# Patient Record
Sex: MALE | Race: BLACK OR AFRICAN AMERICAN | NOT HISPANIC OR LATINO | Employment: OTHER | URBAN - NONMETROPOLITAN AREA
[De-identification: names, ages, dates, MRNs, and addresses within clinical notes are randomized per-mention and may not be internally consistent; named-entity substitution may affect disease eponyms.]

---

## 2020-06-24 ENCOUNTER — HISTORICAL (OUTPATIENT)
Dept: ADMINISTRATIVE | Facility: HOSPITAL | Age: 75
End: 2020-06-24

## 2020-06-24 LAB
APTT PPP: 23.3 SEC (ref 23–30.4)
INR PPP: 1 (ref 0.9–1.2)
PROTHROMBIN TIME: 10.5 SEC (ref 9.8–12.4)

## 2020-06-30 ENCOUNTER — HISTORICAL (OUTPATIENT)
Dept: RADIOLOGY | Facility: HOSPITAL | Age: 75
End: 2020-06-30

## 2022-01-11 ENCOUNTER — HISTORICAL (OUTPATIENT)
Dept: ADMINISTRATIVE | Facility: HOSPITAL | Age: 77
End: 2022-01-11

## 2022-01-11 LAB
ABS NEUT (OLG): 3.7 X10(3)/MCL (ref 2.1–9.2)
ALBUMIN SERPL-MCNC: 3.3 GM/DL (ref 3.4–4.8)
ALBUMIN/GLOB SERPL: 0.9 RATIO (ref 1.1–2)
ALP SERPL-CCNC: 81 UNIT/L (ref 40–150)
ALT SERPL-CCNC: 32 UNIT/L (ref 0–55)
AST SERPL-CCNC: 26 UNIT/L (ref 5–34)
BASOPHILS # BLD AUTO: 0.02 X10(3)/MCL (ref 0–0.2)
BASOPHILS NFR BLD AUTO: 0.4 % (ref 0–0.9)
BILIRUB SERPL-MCNC: 0.2 MG/DL (ref 0.2–1.2)
BILIRUBIN DIRECT+TOT PNL SERPL-MCNC: 0.1 MG/DL (ref 0–0.5)
BILIRUBIN DIRECT+TOT PNL SERPL-MCNC: 0.1 MG/DL (ref 0–0.8)
BUN SERPL-MCNC: 25.4 MG/DL (ref 8.4–25.7)
CALCIUM SERPL-MCNC: 8.9 MG/DL (ref 8.8–10)
CHLORIDE SERPL-SCNC: 100 MMOL/L (ref 98–107)
CHOLEST SERPL-MCNC: 124 MG/DL
CHOLEST/HDLC SERPL: 3 {RATIO} (ref 0–5)
CO2 SERPL-SCNC: 30 MMOL/L (ref 23–31)
CREAT SERPL-MCNC: 4.62 MG/DL (ref 0.72–1.25)
EOSINOPHIL # BLD AUTO: 0.11 X10(3)/MCL (ref 0–0.9)
EOSINOPHIL NFR BLD AUTO: 2 % (ref 0–6.5)
ERYTHROCYTE [DISTWIDTH] IN BLOOD BY AUTOMATED COUNT: 14.6 % (ref 11.5–17)
GLOBULIN SER-MCNC: 3.7 GM/DL (ref 2.4–3.5)
GLUCOSE SERPL-MCNC: 104 MG/DL (ref 82–115)
HCT VFR BLD AUTO: 27.2 % (ref 42–52)
HDLC SERPL-MCNC: 43 MG/DL (ref 40–60)
HGB BLD-MCNC: 8.8 GM/DL (ref 14–18)
IMM GRANULOCYTES # BLD AUTO: 0.01 10*3/UL (ref 0–0.02)
IMM GRANULOCYTES NFR BLD AUTO: 0.2 % (ref 0–0.43)
LDLC SERPL CALC-MCNC: 65 MG/DL (ref 50–140)
LYMPHOCYTES # BLD AUTO: 1.19 X10(3)/MCL (ref 0.6–4.6)
LYMPHOCYTES NFR BLD AUTO: 21.3 % (ref 16.2–38.3)
MCH RBC QN AUTO: 32.8 PG (ref 27–31)
MCHC RBC AUTO-ENTMCNC: 32.4 GM/DL (ref 33–36)
MCV RBC AUTO: 101.5 FL (ref 80–94)
MONOCYTES # BLD AUTO: 0.56 X10(3)/MCL (ref 0.1–1.3)
MONOCYTES NFR BLD AUTO: 10 % (ref 4.7–11.3)
NEUTROPHILS # BLD AUTO: 3.7 X10(3)/MCL (ref 2.1–9.2)
NEUTROPHILS NFR BLD AUTO: 66.1 % (ref 49.1–73.4)
NRBC BLD AUTO-RTO: 0.5 % (ref 0–0.2)
PLATELET # BLD AUTO: 242 X10(3)/MCL (ref 130–400)
PMV BLD AUTO: 9.6 FL (ref 7.4–10.4)
POTASSIUM SERPL-SCNC: 3.6 MMOL/L (ref 3.5–5.1)
PROT SERPL-MCNC: 7 GM/DL (ref 5.8–7.6)
RBC # BLD AUTO: 2.68 X10(6)/MCL (ref 4.7–6.1)
SODIUM SERPL-SCNC: 141 MMOL/L (ref 136–145)
TRIGL SERPL-MCNC: 80 MG/DL (ref 0–150)
VLDLC SERPL CALC-MCNC: 16 MG/DL
WBC # SPEC AUTO: 5.6 X10(3)/MCL (ref 4.5–11.5)

## 2022-03-07 ENCOUNTER — HISTORICAL (OUTPATIENT)
Dept: ADMINISTRATIVE | Facility: HOSPITAL | Age: 77
End: 2022-03-07

## 2022-03-07 LAB — PSA SERPL-MCNC: 0.34 NG/ML

## 2022-03-11 ENCOUNTER — HISTORICAL (OUTPATIENT)
Dept: ADMINISTRATIVE | Facility: HOSPITAL | Age: 77
End: 2022-03-11

## 2022-03-11 LAB
ABS NEUT (OLG): 3.31 (ref 2.1–9.2)
ALBUMIN SERPL-MCNC: 3.1 G/DL (ref 3.4–4.8)
ALBUMIN/GLOB SERPL: 0.9 {RATIO} (ref 1.1–2)
ALP SERPL-CCNC: 83 U/L (ref 40–150)
ALT SERPL-CCNC: 21 U/L (ref 0–55)
AST SERPL-CCNC: 18 U/L (ref 5–34)
BASOPHILS # BLD AUTO: 0.02 10*3/UL (ref 0–0.2)
BASOPHILS NFR BLD AUTO: 0.4 % (ref 0–0.9)
BILIRUB SERPL-MCNC: 0.2 MG/DL (ref 0.2–1.2)
BILIRUBIN DIRECT+TOT PNL SERPL-MCNC: <0.1 (ref 0–0.5)
BILIRUBIN DIRECT+TOT PNL SERPL-MCNC: >0.1 (ref 0–0.8)
BUN SERPL-MCNC: 35.9 MG/DL (ref 8.4–25.7)
CALCIUM SERPL-MCNC: 8.4 MG/DL (ref 8.8–10)
CHLORIDE SERPL-SCNC: 100 MMOL/L (ref 98–107)
CO2 SERPL-SCNC: 30 MMOL/L (ref 23–31)
CREAT SERPL-MCNC: 6.33 MG/DL (ref 0.72–1.25)
EOSINOPHIL # BLD AUTO: 0.13 10*3/UL (ref 0–0.9)
EOSINOPHIL NFR BLD AUTO: 2.4 % (ref 0–6.5)
ERYTHROCYTE [DISTWIDTH] IN BLOOD BY AUTOMATED COUNT: 14.6 % (ref 11.5–17)
GLOBULIN SER-MCNC: 3.3 G/DL (ref 2.4–3.5)
GLUCOSE SERPL-MCNC: 101 MG/DL (ref 82–115)
HCT VFR BLD AUTO: 22.3 % (ref 42–52)
HEMOLYSIS INTERF INDEX SERPL-ACNC: 3
HGB BLD-MCNC: 7.3 G/DL (ref 14–18)
ICTERIC INTERF INDEX SERPL-ACNC: 0
IMM GRANULOCYTES # BLD AUTO: 0.05 10*3/UL (ref 0–0.02)
IMM GRANULOCYTES NFR BLD AUTO: 0.9 % (ref 0–0.43)
LIPEMIC INTERF INDEX SERPL-ACNC: 4
LYMPHOCYTES # BLD AUTO: 1.31 10*3/UL (ref 0.6–4.6)
LYMPHOCYTES NFR BLD AUTO: 23.9 % (ref 16.2–38.3)
MANUAL DIFF? (OHS): NO
MCH RBC QN AUTO: 34.1 PG (ref 27–31)
MCHC RBC AUTO-ENTMCNC: 32.7 G/DL (ref 33–36)
MCV RBC AUTO: 104.2 FL (ref 80–94)
MONOCYTES # BLD AUTO: 0.65 10*3/UL (ref 0.1–1.3)
MONOCYTES NFR BLD AUTO: 11.9 % (ref 4.7–11.3)
NEUTROPHILS # BLD AUTO: 3.31 10*3/UL (ref 2.1–9.2)
NEUTROPHILS NFR BLD AUTO: 60.5 % (ref 49.1–73.4)
NRBC BLD AUTO-RTO: 0 % (ref 0–0.2)
PLATELET # BLD AUTO: 220 10*3/UL (ref 130–400)
PMV BLD AUTO: 9.1 FL (ref 7.4–10.4)
POTASSIUM SERPL-SCNC: 4.1 MMOL/L (ref 3.5–5.1)
PROT SERPL-MCNC: 6.4 G/DL (ref 5.8–7.6)
PSA SERPL-MCNC: 0.37 NG/ML
RBC # BLD AUTO: 2.14 10*6/UL (ref 4.7–6.1)
SODIUM SERPL-SCNC: 139 MMOL/L (ref 136–145)
WBC # SPEC AUTO: 5.5 10*3/UL (ref 4.5–11.5)

## 2022-08-18 ENCOUNTER — LAB REQUISITION (OUTPATIENT)
Dept: LAB | Facility: HOSPITAL | Age: 77
End: 2022-08-18
Payer: MEDICARE

## 2022-08-18 DIAGNOSIS — G40.89 OTHER SEIZURES: ICD-10-CM

## 2022-08-18 PROCEDURE — 80177 DRUG SCRN QUAN LEVETIRACETAM: CPT

## 2022-08-19 LAB — LEVETIRACETAM SERPL-MCNC: 28.3 MCG/ML (ref 10–40)

## 2022-09-01 ENCOUNTER — LAB REQUISITION (OUTPATIENT)
Dept: LAB | Facility: HOSPITAL | Age: 77
End: 2022-09-01
Payer: MEDICARE

## 2022-09-01 DIAGNOSIS — G40.89 OTHER SEIZURES: ICD-10-CM

## 2022-09-01 DIAGNOSIS — I10 ESSENTIAL (PRIMARY) HYPERTENSION: ICD-10-CM

## 2022-09-01 DIAGNOSIS — N40.1 BENIGN PROSTATIC HYPERPLASIA WITH LOWER URINARY TRACT SYMPTOMS: ICD-10-CM

## 2022-09-01 LAB
ACANTHOCYTES (OLG): SLIGHT
ALBUMIN SERPL-MCNC: 3.4 GM/DL (ref 3.4–4.8)
ALBUMIN/GLOB SERPL: 0.9 RATIO (ref 1.1–2)
ALP SERPL-CCNC: 90 UNIT/L (ref 40–150)
ALT SERPL-CCNC: 20 UNIT/L (ref 0–55)
ANISOCYTOSIS BLD QL SMEAR: ABNORMAL
AST SERPL-CCNC: 16 UNIT/L (ref 5–34)
BASOPHILS # BLD AUTO: 0.02 X10(3)/MCL (ref 0–0.2)
BASOPHILS NFR BLD AUTO: 0.5 %
BILIRUBIN DIRECT+TOT PNL SERPL-MCNC: 0.5 MG/DL
BUN SERPL-MCNC: 16.4 MG/DL (ref 8.4–25.7)
CALCIUM SERPL-MCNC: 8.7 MG/DL (ref 8.8–10)
CHLORIDE SERPL-SCNC: 97 MMOL/L (ref 98–107)
CHOLEST SERPL-MCNC: 137 MG/DL
CHOLEST/HDLC SERPL: 3 {RATIO} (ref 0–5)
CO2 SERPL-SCNC: 31 MMOL/L (ref 23–31)
CREAT SERPL-MCNC: 3.98 MG/DL (ref 0.73–1.18)
EOSINOPHIL # BLD AUTO: 0.22 X10(3)/MCL (ref 0–0.9)
EOSINOPHIL NFR BLD AUTO: 5.3 %
ERYTHROCYTE [DISTWIDTH] IN BLOOD BY AUTOMATED COUNT: 17.2 % (ref 11.5–17)
GFR SERPLBLD CREATININE-BSD FMLA CKD-EPI: 15 MLS/MIN/1.73/M2
GLOBULIN SER-MCNC: 3.6 GM/DL (ref 2.4–3.5)
GLUCOSE SERPL-MCNC: 68 MG/DL (ref 82–115)
HCT VFR BLD AUTO: 35.8 % (ref 42–52)
HDLC SERPL-MCNC: 49 MG/DL (ref 35–60)
HGB BLD-MCNC: 11.7 GM/DL (ref 14–18)
HYPOCHROMIA BLD QL SMEAR: ABNORMAL
IMM GRANULOCYTES # BLD AUTO: 0.01 X10(3)/MCL (ref 0–0.04)
IMM GRANULOCYTES NFR BLD AUTO: 0.2 %
LDLC SERPL CALC-MCNC: 73 MG/DL (ref 50–140)
LYMPHOCYTES # BLD AUTO: 1.01 X10(3)/MCL (ref 0.6–4.6)
LYMPHOCYTES NFR BLD AUTO: 24.2 %
MACROCYTES BLD QL SMEAR: ABNORMAL
MCH RBC QN AUTO: 34.7 PG (ref 27–31)
MCHC RBC AUTO-ENTMCNC: 32.7 MG/DL (ref 33–36)
MCV RBC AUTO: 106.2 FL (ref 80–94)
MONOCYTES # BLD AUTO: 0.46 X10(3)/MCL (ref 0.1–1.3)
MONOCYTES NFR BLD AUTO: 11 %
NEUTROPHILS # BLD AUTO: 2.5 X10(3)/MCL (ref 2.1–9.2)
NEUTROPHILS NFR BLD AUTO: 58.8 %
NRBC BLD AUTO-RTO: 0 %
PLATELET # BLD AUTO: 182 X10(3)/MCL (ref 130–400)
PLATELET # BLD EST: ADEQUATE 10*3/UL
PMV BLD AUTO: 9.3 FL (ref 7.4–10.4)
POIKILOCYTOSIS BLD QL SMEAR: SLIGHT
POTASSIUM SERPL-SCNC: 3.8 MMOL/L (ref 3.5–5.1)
PROT SERPL-MCNC: 7 GM/DL (ref 5.8–7.6)
PSA SERPL-MCNC: 0.37 NG/ML
RBC # BLD AUTO: 3.37 X10(6)/MCL (ref 4.7–6.1)
RBC MORPH BLD: ABNORMAL
SODIUM SERPL-SCNC: 144 MMOL/L (ref 136–145)
TRIGL SERPL-MCNC: 74 MG/DL (ref 34–140)
VLDLC SERPL CALC-MCNC: 15 MG/DL
WBC # SPEC AUTO: 4.2 X10(3)/MCL (ref 4.5–11.5)

## 2022-09-01 PROCEDURE — 85025 COMPLETE CBC W/AUTO DIFF WBC: CPT | Performed by: INTERNAL MEDICINE

## 2022-09-01 PROCEDURE — 80053 COMPREHEN METABOLIC PANEL: CPT | Performed by: INTERNAL MEDICINE

## 2022-09-01 PROCEDURE — 84153 ASSAY OF PSA TOTAL: CPT | Performed by: INTERNAL MEDICINE

## 2022-09-01 PROCEDURE — 80061 LIPID PANEL: CPT | Performed by: INTERNAL MEDICINE

## 2022-09-01 PROCEDURE — 80177 DRUG SCRN QUAN LEVETIRACETAM: CPT | Performed by: INTERNAL MEDICINE

## 2022-09-03 LAB — LEVETIRACETAM SERPL-MCNC: 21.5 MCG/ML (ref 10–40)

## 2023-01-06 ENCOUNTER — LAB REQUISITION (OUTPATIENT)
Dept: LAB | Facility: HOSPITAL | Age: 78
End: 2023-01-06
Payer: MEDICARE

## 2023-01-06 DIAGNOSIS — R53.1 WEAKNESS: ICD-10-CM

## 2023-01-06 LAB
ALBUMIN SERPL-MCNC: 3.5 G/DL (ref 3.4–4.8)
ALBUMIN/GLOB SERPL: 0.8 RATIO (ref 1.1–2)
ALP SERPL-CCNC: 80 UNIT/L (ref 40–150)
ALT SERPL-CCNC: 16 UNIT/L (ref 0–55)
ANISOCYTOSIS BLD QL SMEAR: ABNORMAL
AST SERPL-CCNC: 15 UNIT/L (ref 5–34)
BASOPHILS # BLD AUTO: 0.02 X10(3)/MCL (ref 0–0.2)
BASOPHILS NFR BLD AUTO: 0.4 %
BILIRUBIN DIRECT+TOT PNL SERPL-MCNC: 0.6 MG/DL
BUN SERPL-MCNC: 12.4 MG/DL (ref 8.4–25.7)
CALCIUM SERPL-MCNC: 9.2 MG/DL (ref 8.8–10)
CHLORIDE SERPL-SCNC: 96 MMOL/L (ref 98–107)
CO2 SERPL-SCNC: 36 MMOL/L (ref 23–31)
CREAT SERPL-MCNC: 2.74 MG/DL (ref 0.73–1.18)
DEPRECATED CALCIDIOL+CALCIFEROL SERPL-MC: 29.6 NG/ML (ref 30–80)
EOSINOPHIL # BLD AUTO: 0.16 X10(3)/MCL (ref 0–0.9)
EOSINOPHIL NFR BLD AUTO: 3.3 %
ERYTHROCYTE [DISTWIDTH] IN BLOOD BY AUTOMATED COUNT: 15.7 % (ref 11.6–14.4)
GFR SERPLBLD CREATININE-BSD FMLA CKD-EPI: 23 MLS/MIN/1.73/M2
GLOBULIN SER-MCNC: 4.3 GM/DL (ref 2.4–3.5)
GLUCOSE SERPL-MCNC: 108 MG/DL (ref 82–115)
HCT VFR BLD AUTO: 31.1 % (ref 42–52)
HGB BLD-MCNC: 10.3 GM/DL (ref 14–18)
IMM GRANULOCYTES # BLD AUTO: 0.01 X10(3)/MCL (ref 0–0.04)
IMM GRANULOCYTES NFR BLD AUTO: 0.2 %
LYMPHOCYTES # BLD AUTO: 1.02 X10(3)/MCL (ref 0.6–4.6)
LYMPHOCYTES NFR BLD AUTO: 20.8 %
MACROCYTES BLD QL SMEAR: ABNORMAL
MCH RBC QN AUTO: 35 PG
MCHC RBC AUTO-ENTMCNC: 33.1 MG/DL (ref 33–36)
MCV RBC AUTO: 105.8 FL (ref 80–94)
MONOCYTES # BLD AUTO: 0.45 X10(3)/MCL (ref 0.1–1.3)
MONOCYTES NFR BLD AUTO: 9.2 %
NEUTROPHILS # BLD AUTO: 3.25 X10(3)/MCL (ref 2.1–9.2)
NEUTROPHILS NFR BLD AUTO: 66.1 %
NRBC BLD AUTO-RTO: 0 % (ref 0–1)
PLATELET # BLD AUTO: 209 X10(3)/MCL (ref 140–371)
PLATELET # BLD EST: ADEQUATE 10*3/UL
PMV BLD AUTO: 8.9 FL (ref 9.4–12.4)
POTASSIUM SERPL-SCNC: 3.2 MMOL/L (ref 3.5–5.1)
PROT SERPL-MCNC: 7.8 GM/DL (ref 5.8–7.6)
RBC # BLD AUTO: 2.94 X10(6)/MCL (ref 4.7–6.1)
RBC MORPH BLD: ABNORMAL
SODIUM SERPL-SCNC: 140 MMOL/L (ref 136–145)
WBC # SPEC AUTO: 4.9 X10(3)/MCL (ref 4.5–11.5)

## 2023-01-06 PROCEDURE — 80053 COMPREHEN METABOLIC PANEL: CPT | Performed by: NURSE PRACTITIONER

## 2023-01-06 PROCEDURE — 82306 VITAMIN D 25 HYDROXY: CPT | Performed by: NURSE PRACTITIONER

## 2023-01-06 PROCEDURE — 85025 COMPLETE CBC W/AUTO DIFF WBC: CPT | Performed by: NURSE PRACTITIONER

## 2023-02-06 ENCOUNTER — LAB REQUISITION (OUTPATIENT)
Dept: LAB | Facility: HOSPITAL | Age: 78
End: 2023-02-06
Payer: MEDICARE

## 2023-02-06 DIAGNOSIS — G40.509 EPILEPTIC SEIZURES RELATED TO EXTERNAL CAUSES, NOT INTRACTABLE, WITHOUT STATUS EPILEPTICUS: ICD-10-CM

## 2023-02-06 PROCEDURE — 80177 DRUG SCRN QUAN LEVETIRACETAM: CPT

## 2023-02-08 LAB — LEVETIRACETAM SERPL-MCNC: 11.6 MCG/ML (ref 10–40)

## 2023-02-28 ENCOUNTER — LAB REQUISITION (OUTPATIENT)
Dept: LAB | Facility: HOSPITAL | Age: 78
End: 2023-02-28
Payer: MEDICARE

## 2023-02-28 DIAGNOSIS — I10 ESSENTIAL (PRIMARY) HYPERTENSION: ICD-10-CM

## 2023-02-28 DIAGNOSIS — E87.6 HYPOKALEMIA: ICD-10-CM

## 2023-02-28 DIAGNOSIS — N40.1 BENIGN PROSTATIC HYPERPLASIA WITH LOWER URINARY TRACT SYMPTOMS: ICD-10-CM

## 2023-02-28 LAB
ALBUMIN SERPL-MCNC: 3.5 G/DL (ref 3.4–4.8)
ALBUMIN/GLOB SERPL: 0.9 RATIO (ref 1.1–2)
ALP SERPL-CCNC: 88 UNIT/L (ref 40–150)
ALT SERPL-CCNC: 15 UNIT/L (ref 0–55)
ANISOCYTOSIS BLD QL SMEAR: ABNORMAL
AST SERPL-CCNC: 12 UNIT/L (ref 5–34)
BASOPHILS # BLD AUTO: 0.02 X10(3)/MCL (ref 0–0.2)
BASOPHILS NFR BLD AUTO: 0.3 %
BILIRUBIN DIRECT+TOT PNL SERPL-MCNC: 0.2 MG/DL
BUN SERPL-MCNC: 22.9 MG/DL (ref 8.4–25.7)
CALCIUM SERPL-MCNC: 9.2 MG/DL (ref 8.8–10)
CHLORIDE SERPL-SCNC: 98 MMOL/L (ref 98–107)
CHOLEST SERPL-MCNC: 128 MG/DL
CHOLEST/HDLC SERPL: 3 {RATIO} (ref 0–5)
CO2 SERPL-SCNC: 30 MMOL/L (ref 23–31)
CREAT SERPL-MCNC: 5.45 MG/DL (ref 0.73–1.18)
EOSINOPHIL # BLD AUTO: 0.25 X10(3)/MCL (ref 0–0.9)
EOSINOPHIL NFR BLD AUTO: 3.7 %
ERYTHROCYTE [DISTWIDTH] IN BLOOD BY AUTOMATED COUNT: 15.5 % (ref 11.5–17)
GFR SERPLBLD CREATININE-BSD FMLA CKD-EPI: 10 MLS/MIN/1.73/M2
GLOBULIN SER-MCNC: 3.8 GM/DL (ref 2.4–3.5)
GLUCOSE SERPL-MCNC: 99 MG/DL (ref 82–115)
HCT VFR BLD AUTO: 34.6 % (ref 42–52)
HDLC SERPL-MCNC: 45 MG/DL (ref 35–60)
HGB BLD-MCNC: 11.3 G/DL (ref 14–18)
IMM GRANULOCYTES # BLD AUTO: 0.02 X10(3)/MCL (ref 0–0.04)
IMM GRANULOCYTES NFR BLD AUTO: 0.3 %
LDLC SERPL CALC-MCNC: 65 MG/DL (ref 50–140)
LYMPHOCYTES # BLD AUTO: 1.36 X10(3)/MCL (ref 0.6–4.6)
LYMPHOCYTES NFR BLD AUTO: 20.1 %
MACROCYTES BLD QL SMEAR: ABNORMAL
MCH RBC QN AUTO: 35.2 PG
MCHC RBC AUTO-ENTMCNC: 32.7 G/DL (ref 33–36)
MCV RBC AUTO: 107.8 FL (ref 80–94)
MONOCYTES # BLD AUTO: 0.52 X10(3)/MCL (ref 0.1–1.3)
MONOCYTES NFR BLD AUTO: 7.7 %
NEUTROPHILS # BLD AUTO: 4.61 X10(3)/MCL (ref 2.1–9.2)
NEUTROPHILS NFR BLD AUTO: 67.9 %
NRBC BLD AUTO-RTO: 0.3 %
PLATELET # BLD AUTO: 189 X10(3)/MCL (ref 130–400)
PLATELET # BLD EST: ADEQUATE 10*3/UL
PMV BLD AUTO: 9.1 FL (ref 7.4–10.4)
POTASSIUM SERPL-SCNC: 3.7 MMOL/L (ref 3.5–5.1)
PROT SERPL-MCNC: 7.3 GM/DL (ref 5.8–7.6)
PSA SERPL-MCNC: 0.43 NG/ML
RBC # BLD AUTO: 3.21 X10(6)/MCL (ref 4.7–6.1)
RBC MORPH BLD: ABNORMAL
SODIUM SERPL-SCNC: 140 MMOL/L (ref 136–145)
TRIGL SERPL-MCNC: 91 MG/DL (ref 34–140)
VLDLC SERPL CALC-MCNC: 18 MG/DL
WBC # SPEC AUTO: 6.8 X10(3)/MCL (ref 4.5–11.5)

## 2023-02-28 PROCEDURE — 84153 ASSAY OF PSA TOTAL: CPT | Performed by: INTERNAL MEDICINE

## 2023-02-28 PROCEDURE — 80061 LIPID PANEL: CPT | Performed by: INTERNAL MEDICINE

## 2023-02-28 PROCEDURE — 80177 DRUG SCRN QUAN LEVETIRACETAM: CPT | Performed by: INTERNAL MEDICINE

## 2023-02-28 PROCEDURE — 80053 COMPREHEN METABOLIC PANEL: CPT | Performed by: INTERNAL MEDICINE

## 2023-02-28 PROCEDURE — 85025 COMPLETE CBC W/AUTO DIFF WBC: CPT | Performed by: INTERNAL MEDICINE

## 2023-03-01 LAB — LEVETIRACETAM SERPL-MCNC: 22.6 MCG/ML (ref 10–40)

## 2023-03-10 ENCOUNTER — LAB REQUISITION (OUTPATIENT)
Dept: LAB | Facility: HOSPITAL | Age: 78
End: 2023-03-10
Payer: MEDICARE

## 2023-03-10 DIAGNOSIS — N40.1 BENIGN PROSTATIC HYPERPLASIA WITH LOWER URINARY TRACT SYMPTOMS: ICD-10-CM

## 2023-03-10 LAB — PSA SERPL-MCNC: 0.42 NG/ML

## 2023-03-10 PROCEDURE — 84153 ASSAY OF PSA TOTAL: CPT | Performed by: UROLOGY

## 2023-04-13 ENCOUNTER — LAB REQUISITION (OUTPATIENT)
Dept: LAB | Facility: HOSPITAL | Age: 78
End: 2023-04-13
Payer: MEDICARE
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
## 2023-04-13 LAB
ANION GAP SERPL CALC-SCNC: 11 MEQ/L
BASOPHILS # BLD AUTO: 0.01 X10(3)/MCL (ref 0–0.2)
BASOPHILS NFR BLD AUTO: 0.2 %
BUN SERPL-MCNC: 20.2 MG/DL (ref 8.4–25.7)
CALCIUM SERPL-MCNC: 9.7 MG/DL (ref 8.8–10)
CHLORIDE SERPL-SCNC: 98 MMOL/L (ref 98–107)
CO2 SERPL-SCNC: 30 MMOL/L (ref 23–31)
CREAT SERPL-MCNC: 5.18 MG/DL (ref 0.73–1.18)
CREAT/UREA NIT SERPL: 4
EOSINOPHIL # BLD AUTO: 0.21 X10(3)/MCL (ref 0–0.9)
EOSINOPHIL NFR BLD AUTO: 4.2 %
ERYTHROCYTE [DISTWIDTH] IN BLOOD BY AUTOMATED COUNT: 14.2 % (ref 11.5–17)
GFR SERPLBLD CREATININE-BSD FMLA CKD-EPI: 11 MLS/MIN/1.73/M2
GLUCOSE SERPL-MCNC: 168 MG/DL (ref 82–115)
HCT VFR BLD AUTO: 33.9 % (ref 42–52)
HGB BLD-MCNC: 10.9 G/DL (ref 14–18)
HYPOCHROMIA BLD QL SMEAR: ABNORMAL
IMM GRANULOCYTES # BLD AUTO: 0.01 X10(3)/MCL (ref 0–0.04)
IMM GRANULOCYTES NFR BLD AUTO: 0.2 %
LYMPHOCYTES # BLD AUTO: 1.45 X10(3)/MCL (ref 0.6–4.6)
LYMPHOCYTES NFR BLD AUTO: 28.8 %
MACROCYTES BLD QL SMEAR: ABNORMAL
MCH RBC QN AUTO: 34.3 PG (ref 27–31)
MCHC RBC AUTO-ENTMCNC: 32.2 G/DL (ref 33–36)
MCV RBC AUTO: 106.6 FL (ref 80–94)
MONOCYTES # BLD AUTO: 0.27 X10(3)/MCL (ref 0.1–1.3)
MONOCYTES NFR BLD AUTO: 5.4 %
NEUTROPHILS # BLD AUTO: 3.08 X10(3)/MCL (ref 2.1–9.2)
NEUTROPHILS NFR BLD AUTO: 61.2 %
NRBC BLD AUTO-RTO: 0 %
PLATELET # BLD AUTO: 200 X10(3)/MCL (ref 130–400)
PLATELET # BLD EST: ADEQUATE 10*3/UL
PMV BLD AUTO: 9.2 FL (ref 7.4–10.4)
POTASSIUM SERPL-SCNC: 3.6 MMOL/L (ref 3.5–5.1)
RBC # BLD AUTO: 3.18 X10(6)/MCL (ref 4.7–6.1)
RBC MORPH BLD: ABNORMAL
SODIUM SERPL-SCNC: 139 MMOL/L (ref 136–145)
WBC # SPEC AUTO: 5 X10(3)/MCL (ref 4.5–11.5)

## 2023-04-13 PROCEDURE — 85025 COMPLETE CBC W/AUTO DIFF WBC: CPT | Performed by: SPECIALIST

## 2023-04-13 PROCEDURE — 80048 BASIC METABOLIC PNL TOTAL CA: CPT | Performed by: SPECIALIST

## 2023-04-20 ENCOUNTER — HOSPITAL ENCOUNTER (OUTPATIENT)
Facility: HOSPITAL | Age: 78
Discharge: HOME | End: 2023-04-20
Attending: STUDENT IN AN ORGANIZED HEALTH CARE EDUCATION/TRAINING PROGRAM | Admitting: STUDENT IN AN ORGANIZED HEALTH CARE EDUCATION/TRAINING PROGRAM
Payer: MEDICARE

## 2023-04-20 VITALS
HEART RATE: 86 BPM | SYSTOLIC BLOOD PRESSURE: 128 MMHG | OXYGEN SATURATION: 93 % | DIASTOLIC BLOOD PRESSURE: 81 MMHG | TEMPERATURE: 98 F

## 2023-04-20 DIAGNOSIS — T82.590A MECHANICAL COMPLICATION OF ARTERIOVENOUS FISTULA SURGICALLY CREATED, INITIAL ENCOUNTER: Primary | ICD-10-CM

## 2023-04-20 DIAGNOSIS — T82.590A MECHANICAL COMPLICATION OF ARTERIOVENOUS FISTULA SURGICALLY CREATED: ICD-10-CM

## 2023-04-20 PROCEDURE — 25500020 PHARM REV CODE 255: Performed by: STUDENT IN AN ORGANIZED HEALTH CARE EDUCATION/TRAINING PROGRAM

## 2023-04-20 PROCEDURE — 99152 MOD SED SAME PHYS/QHP 5/>YRS: CPT | Mod: ,,, | Performed by: STUDENT IN AN ORGANIZED HEALTH CARE EDUCATION/TRAINING PROGRAM

## 2023-04-20 PROCEDURE — 27201423 OPTIME MED/SURG SUP & DEVICES STERILE SUPPLY: Performed by: STUDENT IN AN ORGANIZED HEALTH CARE EDUCATION/TRAINING PROGRAM

## 2023-04-20 PROCEDURE — 63600175 PHARM REV CODE 636 W HCPCS: Performed by: STUDENT IN AN ORGANIZED HEALTH CARE EDUCATION/TRAINING PROGRAM

## 2023-04-20 PROCEDURE — 99152 PR MOD CONSCIOUS SEDATION, SAME PHYS, 5+ YRS, FIRST 15 MIN: ICD-10-PCS | Mod: ,,, | Performed by: STUDENT IN AN ORGANIZED HEALTH CARE EDUCATION/TRAINING PROGRAM

## 2023-04-20 PROCEDURE — 99213 PR OFFICE/OUTPT VISIT, EST, LEVL III, 20-29 MIN: ICD-10-PCS | Mod: 25,,, | Performed by: STUDENT IN AN ORGANIZED HEALTH CARE EDUCATION/TRAINING PROGRAM

## 2023-04-20 PROCEDURE — 36903 INTRO CATH DIALYSIS CIRCUIT: CPT | Mod: LT,,, | Performed by: STUDENT IN AN ORGANIZED HEALTH CARE EDUCATION/TRAINING PROGRAM

## 2023-04-20 PROCEDURE — C1874 STENT, COATED/COV W/DEL SYS: HCPCS | Performed by: STUDENT IN AN ORGANIZED HEALTH CARE EDUCATION/TRAINING PROGRAM

## 2023-04-20 PROCEDURE — 36903 PR INTRO CATH, DIALYSIS CIRCUIT W/TRANSCATH PLCMNT, STENT: ICD-10-PCS | Mod: LT,,, | Performed by: STUDENT IN AN ORGANIZED HEALTH CARE EDUCATION/TRAINING PROGRAM

## 2023-04-20 PROCEDURE — C1769 GUIDE WIRE: HCPCS | Performed by: STUDENT IN AN ORGANIZED HEALTH CARE EDUCATION/TRAINING PROGRAM

## 2023-04-20 PROCEDURE — 99213 OFFICE O/P EST LOW 20 MIN: CPT | Mod: 25,,, | Performed by: STUDENT IN AN ORGANIZED HEALTH CARE EDUCATION/TRAINING PROGRAM

## 2023-04-20 PROCEDURE — 36903 INTRO CATH DIALYSIS CIRCUIT: CPT | Mod: LT | Performed by: STUDENT IN AN ORGANIZED HEALTH CARE EDUCATION/TRAINING PROGRAM

## 2023-04-20 PROCEDURE — 25000003 PHARM REV CODE 250: Performed by: STUDENT IN AN ORGANIZED HEALTH CARE EDUCATION/TRAINING PROGRAM

## 2023-04-20 PROCEDURE — C1894 INTRO/SHEATH, NON-LASER: HCPCS | Performed by: STUDENT IN AN ORGANIZED HEALTH CARE EDUCATION/TRAINING PROGRAM

## 2023-04-20 PROCEDURE — 99152 MOD SED SAME PHYS/QHP 5/>YRS: CPT | Performed by: STUDENT IN AN ORGANIZED HEALTH CARE EDUCATION/TRAINING PROGRAM

## 2023-04-20 PROCEDURE — 99153 MOD SED SAME PHYS/QHP EA: CPT | Performed by: STUDENT IN AN ORGANIZED HEALTH CARE EDUCATION/TRAINING PROGRAM

## 2023-04-20 PROCEDURE — C1725 CATH, TRANSLUMIN NON-LASER: HCPCS | Performed by: STUDENT IN AN ORGANIZED HEALTH CARE EDUCATION/TRAINING PROGRAM

## 2023-04-20 DEVICE — GORE VIABAHN ENDOPROSTHESIS 10MMX5CM 8FR 120CMCATH
Type: IMPLANTABLE DEVICE | Site: ARM | Status: FUNCTIONAL
Brand: GORE VIABAHN ENDOPROSTHESIS

## 2023-04-20 RX ORDER — LIDOCAINE HYDROCHLORIDE 10 MG/ML
INJECTION INFILTRATION; PERINEURAL
Status: DISCONTINUED | OUTPATIENT
Start: 2023-04-20 | End: 2023-04-20 | Stop reason: HOSPADM

## 2023-04-20 RX ORDER — SODIUM CHLORIDE 0.9 % (FLUSH) 0.9 %
10 SYRINGE (ML) INJECTION
Status: DISCONTINUED | OUTPATIENT
Start: 2023-04-20 | End: 2023-04-20 | Stop reason: HOSPADM

## 2023-04-20 RX ORDER — FENTANYL CITRATE 50 UG/ML
INJECTION, SOLUTION INTRAMUSCULAR; INTRAVENOUS
Status: DISCONTINUED | OUTPATIENT
Start: 2023-04-20 | End: 2023-04-20 | Stop reason: HOSPADM

## 2023-04-20 RX ORDER — MIDAZOLAM HYDROCHLORIDE 1 MG/ML
INJECTION INTRAMUSCULAR; INTRAVENOUS
Status: DISCONTINUED | OUTPATIENT
Start: 2023-04-20 | End: 2023-04-20 | Stop reason: HOSPADM

## 2023-04-20 NOTE — NURSING
Report given to Sandie FINN from [facility], copy of post fistulagram care instruction and the stent card is read out to her and it will be with AVS folder.

## 2023-04-20 NOTE — DISCHARGE SUMMARY
INTERVENTIONAL NEPHROLOGY DISCHARGE SUMMARY         Patient Name: Angel Pablo Sr. TEIXEIRAB. 1945    Procedure Date: 2023      In brief, Mr. Pablo underwent fistulogram for enlarging aneurysms and access pulsatility. Angiogram revealed the following:  A stenosis that remained persistent despite angioplasty in the cephalic arch. This was ultimately treated with a Viabahn 10 mm x 5 cm stent-graft. There was good resolution of stenosis.  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 Manipulating a wire through these areas was somewhat challenging, but was quickly achieved.     I am pleased with the results of today's treatment. In my opinion, I believe the access can be salvaged, though particular care will need to be had near the areas of of tortuosity listed above. I do not expect there to be a significant outflow stenosis that would result in a bad outcome from revision now that a stent-graft has been placed in the cephalic arch.    Pre-Op Diagnosis: T82.590A Mechanical complication of surgically created arteriovenous shunt, initial encounter, N18.6 End Stage Renal Disease (ESRD)  Post-Op Diagnosis: Same.  Findings:   -     Discharge Instructions/Recommendations:  - Continue use of fistula.   - Pt will likely be scheduled for revision of his AVF.  - Pt may be discharged after successful monitoring in post-op area.  - Pt may resume home medications.    Thank you for allowing me the opportunity in taking care of this patient. Please reach me with any questions.    Akil Escobar DO  Interventional Nephrology  Cell: [phone]

## 2023-04-20 NOTE — Clinical Note
The balloon was inflated with indeflator in the  . The balloon max pressure was 16 kevin for 20 seconds

## 2023-04-20 NOTE — Clinical Note
The balloon was inflated with indeflator in the  . The balloon max pressure was 16 kevin for 40 seconds

## 2023-04-20 NOTE — H&P
INTERVENTIONAL NEPHROLOGY HISTORY & PHYSICAL UPDATE         Patient Name: Angel Pablo Sr. TEIXEIRAB. 1945    Date: 2023      Scanned History & Physical reviewed. Updates are as follows:  - Pt presents with heavily aneurysmal access that will potentially undergo revision. He has been referred for angiogram to determine suitability for revision, with consideration of treating any significant stenotic lesions that may be present.  - Consents obtained and placed in chart.  - Will proceed in cath lab setting today.    Please reach me with any questions.    Akil Escobar DO  Interventional Nephrology  Cell: [phone]    ADDENDUM: H&P was not scanned into the EMR. Will instead scribe a replacement below.          INTERVENTIONAL NEPHROLOGY HISTORY & PHYSICAL       Patient Name: Angel Pablo SrSheng ALBERTO 1945    Date: 2023  Time: 10:12 AM         HPI: 77 y.o. male with ESRD on HD via left brachiocephalic arteriovenous fistula who presents with significantly aneurysmal fistula that may undergo revision in the near future. Pt states that his access has been growing in size. He was referred to our service by Dr. Bates for evaluation, and was seen in clinic with Dr. Delatorre two weeks ago. At that time it was determined the pt may need to undergo revision of the fistula, but a fistulogram would be necessary for surgical planning. Pt is being prepared for fistulogram with possible intervention today.    Pt seen and examined at bedside in CVSS this AM. Family is not present. Risks and benefits of fistulogram with possible intervention and intravenous conscious sedation was reviewed with the patient. The patient agrees to proceed with the intended procedure. Consents for both intravenous conscious sedation and procedure were signed and placed within the chart.       Review of Systems:  General:  No fatigue  Skin: No rashes  HEENT: No vision changes  CVS: No CP  RS: No SOB  GIT: No abdominal  pain  Extremities: No swelling  Neurological:  No focal weakness  Psych: No depression    History reviewed. No pertinent past medical history.   History reviewed. No pertinent surgical history.   Review of patient's allergies indicates:  No Known Allergies   Social History     Tobacco Use    Smoking status: Never    Smokeless tobacco: Never   Substance Use Topics    Alcohol use: Not Currently    Drug use: Never      History reviewed. No pertinent family history.    No current facility-administered medications for this encounter.  No current outpatient medications on file.    Vital Signs:  Pulse:  [86] 86  SpO2:  [93 %] 93 %  BP: (121-128)/(78-81) 128/81     Physical Exam:  General: NAD  HEENT: NC/AT, EOMI  CVS: RRR.  RS: breathing easily.  Abdominal: Soft, NT/ND.  Extremities: No edema b/l LE  Skin: No rash, no lesions.  Neurological: No focal deficits.  Psych: Normal affect  Dialysis Access: left brachiocephalic arteriovenous fistula with moderate-severe pulsatility. Severely aneurysmal throughout.     Results:    Lab Results   Component Value Date     09/01/2022    K 3.8 09/01/2022    CO2 31 09/01/2022    BUN 16.4 09/01/2022    CREATININE 3.98 (H) 09/01/2022     Lab Results   Component Value Date    WBC 4.2 (L) 09/01/2022    HGB 11.7 (L) 09/01/2022     09/01/2022    .2 (H) 09/01/2022       Assessment and Plan:      ESRD on HD via left brachiocephalic arteriovenous fistula.  Mechanical complication of AVF.  Pt with ESRD on HD via left brachiocephalic arteriovenous fistula who presents today with aneurysmal fistula. The pt is being prepared for fistulogram with possible intervention today.  - Consents obtained and placed within chart.  - Will proceed in cath lab setting today.    Please feel free to reach me with any questions.    Akil Escobar,   Interventional Nephrology  Cell: [phone]

## 2023-04-20 NOTE — Clinical Note
The balloon was inflated with indeflator in the  . The balloon max pressure was 6 kevin for 10 seconds

## 2023-04-20 NOTE — PROCEDURES
INTERVENTIONAL NEPHROLOGY PROCEDURE NOTE: FISTULOGRAM/GRAFTOGRAM         Patient Name: Angel Pablo Sr. MONTANO 1945    Procedure Date:    2023    Performing Physician:   Dr. Escobar    Access History: Pt is with ESRD on HD typically via left brachiocephalic arteriovenous fistula who presents today with aneurysm and pulsatility.    Pre-Op Diagnosis: T82.590A Mechanical complication of surgically created arteriovenous shunt, initial encounter, N18.6 End Stage Renal Disease (ESRD)  Post-Op Diagnosis: Same    Procedure: Fistulogram with possible angioplasty and stent placement.    Indication: Nonfunctional/Dysfunctional/Malfunctioning HD access.    Informed Consent:  The patient was evaluated in the pre-operative area with assessment including the American Society of Anesthesia risk classification. The procedure is discussed in detail including risks, benefits alternatives and options and the patient agrees to proceed. Informed consent was obtained from the patient.     Maximum sterile barrier technique: The patient was prepped and draped using chlorhexidine prep and maximum sterile barrier technique.    Sedation Note:  Risks and benefits of sedation were reviewed with the patient or surrogate, including bleeding, infection, nausea, vomiting, dizziness, instability, damage to a nerve, damage to a blood vessel, cellulitis, reaction to medications, amnesia, loss of consciousness, respiratory arrest, cardiac arrest.     The patient received the following medications: Versed 1 mg IV and Fentanyl 25 mcg IV; patient did remain alert, responsive, and conversational throughout the procedure. I was personally responsible for supervising the administration of moderate sedation services during the procedure performed and I affirm all the guidelines and requirements described in the CPT 2023 section on moderate sedation were followed, including the use of an independent trained observer who had no other duties  during the procedure. The total face-to-face time was 45 minutes.    Procedure Steps:     The patient was prepped and draped in sterile fashion. Procedure ultrasound revealed patent vascular HD access.    Local anesthesia was administered by injecting 1% lidocaine at the intended site of cannulation. With use of live ultrasonographic visualization, the vascular access was successfully cannulated with a mini-stick needle aimed towards the outflow. After blood flashback was noted, the mini-stick wire was advanced through the needle. Via Seldinger technique, the needle was exchanged for the mini-stick sheath. Angiograms were completed which revealed 1 lesion(s) (summarized below). A 150 cm glide wire was advanced through the mini-stick sheath with the tip parked in the superior vena cava. A 6 Fr sheath was exchanged via Seldinger technique for the mini-stick sheath.    The aforementioned lesion(s) are as below, followed by their respective intervention(s) :  #1: Approximately 80% stenosis in the left cephalic arch.  Angioplasty was performed at this location using a [employer]  9 mm x 30 mm balloon. Approximately 75% effacement was obtained at 15-20 GEORGE and was held for 1-5 seconds. Post-angioplasty angiogram revealed 50% residual stenosis. We then upgraded our balloon to a BD Conquest 9 mm x 4 cm and placed it at the same stenosis, inflating to 16 GEORGE and achieving 100% effacement. Post-angioplasty angiogram revealed a 30% residual stenosis. There was concern this area would recur stenosis in the short-term, and therefore stent-graft was pursued. The sheath was upgraded to an 8 Fr size. A [place] Viabahn 10 mm x 50 mm stent-graft was placed at the left cephalic arch. The stent-graft was posted with a BD [employer] 10 mm x 40 mm balloon. Post-angioplasty angiogram revealed appropriate deployment of stent-graft and complete resolution of stenosis.  * Retrograde angiogram showed patent inflow segment without  notable lesions.    Lesions were treated in the following order: #1.    Wire and sheath were removed and hemostasis was achieved using a purse-string stitch and light digital pressure at the site of cannulation.    ASSESSMENT/PLAN:  - Successful angioplasty and stent-graft placement in the left cephalic arch.    EBL: 5 ml    Contrast: 40 ml    Complications: None    Post-op Instructions: The patient was given both verbal and written post-op instructions. If excessive bleeding at the site, they have been instructed to call their physician or proceed to a local emergency room.    Orders to the dialysis unit: OK to use access for dialysis needs.    Thank you for allowing me the opportunity in taking care of this patient. Please reach me with any questions.    Akil Escobar DO  Interventional Nephrology  Cell: [phone]

## 2023-04-20 NOTE — Clinical Note
The left radial and left brachial was prepped. The site was prepped with ChloraPrep. The site was clipped. The patient was draped. The patient was positioned supine. Left Arm AV Fistula

## 2023-04-20 NOTE — Clinical Note
The balloon was inflated with indeflator in the  . The balloon max pressure was 16 kevin for 20 seconds normal...

## 2023-04-21 RX ORDER — LEVETIRACETAM 500 MG/1
500 TABLET ORAL 2 TIMES DAILY
COMMUNITY

## 2023-04-21 RX ORDER — ASPIRIN 81 MG/1
81 TABLET ORAL DAILY
COMMUNITY

## 2023-04-21 RX ORDER — TAMSULOSIN HYDROCHLORIDE 0.4 MG/1
0.8 CAPSULE ORAL NIGHTLY
COMMUNITY

## 2023-04-21 RX ORDER — ATORVASTATIN CALCIUM 40 MG/1
40 TABLET, FILM COATED ORAL NIGHTLY
COMMUNITY

## 2023-04-21 RX ORDER — MIRABEGRON 50 MG/1
1 TABLET, FILM COATED, EXTENDED RELEASE ORAL DAILY
COMMUNITY

## 2023-04-21 RX ORDER — MEMANTINE HYDROCHLORIDE 10 MG/1
10 TABLET ORAL 2 TIMES DAILY
COMMUNITY

## 2023-04-21 RX ORDER — ONDANSETRON 4 MG/1
4 TABLET, ORALLY DISINTEGRATING ORAL EVERY 6 HOURS PRN
COMMUNITY

## 2023-04-21 RX ORDER — DEXTROMETHORPHAN HYDROBROMIDE, GUAIFENESIN 5; 100 MG/5ML; MG/5ML
650 LIQUID ORAL EVERY 4 HOURS PRN
COMMUNITY

## 2023-04-21 RX ORDER — POTASSIUM CHLORIDE 750 MG/1
10 TABLET, EXTENDED RELEASE ORAL ONCE
COMMUNITY

## 2023-04-21 RX ORDER — IBUPROFEN 100 MG/5ML
1000 SUSPENSION, ORAL (FINAL DOSE FORM) ORAL DAILY
COMMUNITY

## 2023-04-21 RX ORDER — ACETAMINOPHEN 500 MG
10 TABLET ORAL NIGHTLY
COMMUNITY

## 2023-05-02 ENCOUNTER — ANESTHESIA EVENT (OUTPATIENT)
Dept: SURGERY | Facility: HOSPITAL | Age: 78
End: 2023-05-02
Payer: MEDICARE

## 2023-05-03 ENCOUNTER — ANESTHESIA (OUTPATIENT)
Dept: SURGERY | Facility: HOSPITAL | Age: 78
End: 2023-05-03
Payer: MEDICARE

## 2023-05-03 ENCOUNTER — HOSPITAL ENCOUNTER (OUTPATIENT)
Facility: HOSPITAL | Age: 78
Discharge: HOME | End: 2023-05-03
Attending: SPECIALIST | Admitting: SPECIALIST
Payer: MEDICARE

## 2023-05-03 VITALS
DIASTOLIC BLOOD PRESSURE: 74 MMHG | OXYGEN SATURATION: 95 % | WEIGHT: 174.63 LBS | HEIGHT: 73 IN | BODY MASS INDEX: 23.14 KG/M2 | SYSTOLIC BLOOD PRESSURE: 126 MMHG | TEMPERATURE: 98 F | HEART RATE: 75 BPM | RESPIRATION RATE: 18 BRPM

## 2023-05-03 DIAGNOSIS — N18.6 END STAGE KIDNEY DISEASE: ICD-10-CM

## 2023-05-03 DIAGNOSIS — T82.510A: Primary | ICD-10-CM

## 2023-05-03 DIAGNOSIS — T82.510D: ICD-10-CM

## 2023-05-03 DIAGNOSIS — I25.10 CORONARY ARTERY DISEASE: ICD-10-CM

## 2023-05-03 LAB
ANION GAP SERPL CALC-SCNC: 9 MEQ/L
BUN SERPL-MCNC: 20.3 MG/DL (ref 8.4–25.7)
CALCIUM SERPL-MCNC: 9.4 MG/DL (ref 8.8–10)
CHLORIDE SERPL-SCNC: 97 MMOL/L (ref 98–107)
CO2 SERPL-SCNC: 33 MMOL/L (ref 23–31)
CREAT SERPL-MCNC: 4.77 MG/DL (ref 0.73–1.18)
CREAT/UREA NIT SERPL: 4
GFR SERPLBLD CREATININE-BSD FMLA CKD-EPI: 12 MLS/MIN/1.73/M2
GLUCOSE SERPL-MCNC: 96 MG/DL (ref 82–115)
POTASSIUM SERPL-SCNC: 3.9 MMOL/L (ref 3.5–5.1)
SODIUM SERPL-SCNC: 139 MMOL/L (ref 136–145)

## 2023-05-03 PROCEDURE — D9220A PRA ANESTHESIA: Mod: CRNA,,, | Performed by: STUDENT IN AN ORGANIZED HEALTH CARE EDUCATION/TRAINING PROGRAM

## 2023-05-03 PROCEDURE — 63600175 PHARM REV CODE 636 W HCPCS: Performed by: NURSE ANESTHETIST, CERTIFIED REGISTERED

## 2023-05-03 PROCEDURE — 25000003 PHARM REV CODE 250: Performed by: STUDENT IN AN ORGANIZED HEALTH CARE EDUCATION/TRAINING PROGRAM

## 2023-05-03 PROCEDURE — 25000003 PHARM REV CODE 250: Performed by: SPECIALIST

## 2023-05-03 PROCEDURE — 37000009 HC ANESTHESIA EA ADD 15 MINS: Performed by: SPECIALIST

## 2023-05-03 PROCEDURE — 63600175 PHARM REV CODE 636 W HCPCS: Performed by: STUDENT IN AN ORGANIZED HEALTH CARE EDUCATION/TRAINING PROGRAM

## 2023-05-03 PROCEDURE — 64415 NJX AA&/STRD BRCH PLXS IMG: CPT | Performed by: ANESTHESIOLOGY

## 2023-05-03 PROCEDURE — C1750 CATH, HEMODIALYSIS,LONG-TERM: HCPCS | Performed by: SPECIALIST

## 2023-05-03 PROCEDURE — 63600175 PHARM REV CODE 636 W HCPCS: Performed by: ANESTHESIOLOGY

## 2023-05-03 PROCEDURE — 36832 AV FISTULA REVISION OPEN: CPT | Mod: 78,LT,, | Performed by: SPECIALIST

## 2023-05-03 PROCEDURE — D9220A PRA ANESTHESIA: ICD-10-PCS | Mod: ANES,,, | Performed by: ANESTHESIOLOGY

## 2023-05-03 PROCEDURE — 63600175 PHARM REV CODE 636 W HCPCS: Performed by: SPECIALIST

## 2023-05-03 PROCEDURE — 80048 BASIC METABOLIC PNL TOTAL CA: CPT | Performed by: SPECIALIST

## 2023-05-03 PROCEDURE — 88304 TISSUE EXAM BY PATHOLOGIST: CPT | Performed by: SPECIALIST

## 2023-05-03 PROCEDURE — D9220A PRA ANESTHESIA: Mod: ANES,,, | Performed by: ANESTHESIOLOGY

## 2023-05-03 PROCEDURE — 71000015 HC POSTOP RECOV 1ST HR: Performed by: SPECIALIST

## 2023-05-03 PROCEDURE — 37000008 HC ANESTHESIA 1ST 15 MINUTES: Performed by: SPECIALIST

## 2023-05-03 PROCEDURE — 36558 INSERT TUNNELED CV CATH: CPT | Mod: 79,51,RT, | Performed by: SPECIALIST

## 2023-05-03 PROCEDURE — D9220A PRA ANESTHESIA: ICD-10-PCS | Mod: CRNA,,, | Performed by: STUDENT IN AN ORGANIZED HEALTH CARE EDUCATION/TRAINING PROGRAM

## 2023-05-03 PROCEDURE — 36000706: Performed by: SPECIALIST

## 2023-05-03 PROCEDURE — 36832 PR AV FISTULA REVISION, OPEN, W/O THROMBECTOMY: ICD-10-PCS | Mod: 78,AS,,

## 2023-05-03 PROCEDURE — 71000016 HC POSTOP RECOV ADDL HR: Performed by: SPECIALIST

## 2023-05-03 PROCEDURE — 25000003 PHARM REV CODE 250: Performed by: NURSE ANESTHETIST, CERTIFIED REGISTERED

## 2023-05-03 PROCEDURE — 63600175 PHARM REV CODE 636 W HCPCS

## 2023-05-03 PROCEDURE — 36832 AV FISTULA REVISION OPEN: CPT | Mod: 78,AS,,

## 2023-05-03 PROCEDURE — 36000707: Performed by: SPECIALIST

## 2023-05-03 PROCEDURE — 77001 FLUOROGUIDE FOR VEIN DEVICE: CPT | Mod: 26,,, | Performed by: SPECIALIST

## 2023-05-03 PROCEDURE — 36832 PR AV FISTULA REVISION, OPEN, W/O THROMBECTOMY: ICD-10-PCS | Mod: 78,LT,, | Performed by: SPECIALIST

## 2023-05-03 PROCEDURE — 77001 CHG FLUOROGUIDE CNTRL VEN ACCESS,PLACE,REPLACE,REMOVE: ICD-10-PCS | Mod: 26,,, | Performed by: SPECIALIST

## 2023-05-03 PROCEDURE — 36558 PR INSERT TUNNELED CV CATH W/O PORT OR PUMP: ICD-10-PCS | Mod: 79,51,RT, | Performed by: SPECIALIST

## 2023-05-03 DEVICE — KIT CATH PALINDROME 23X40CM: Type: IMPLANTABLE DEVICE | Site: CHEST | Status: FUNCTIONAL

## 2023-05-03 RX ORDER — LIDOCAINE HYDROCHLORIDE 20 MG/ML
INJECTION, SOLUTION EPIDURAL; INFILTRATION; INTRACAUDAL; PERINEURAL
Status: DISCONTINUED | OUTPATIENT
Start: 2023-05-03 | End: 2023-05-03

## 2023-05-03 RX ORDER — HYDROCODONE BITARTRATE AND ACETAMINOPHEN 5; 325 MG/1; MG/1
1 TABLET ORAL EVERY 4 HOURS PRN
Status: DISCONTINUED | OUTPATIENT
Start: 2023-05-03 | End: 2023-05-03 | Stop reason: HOSPADM

## 2023-05-03 RX ORDER — CEFAZOLIN SODIUM 2 G/50ML
2 SOLUTION INTRAVENOUS
Status: DISCONTINUED | OUTPATIENT
Start: 2023-05-03 | End: 2023-05-03 | Stop reason: HOSPADM

## 2023-05-03 RX ORDER — ROPIVACAINE HYDROCHLORIDE 5 MG/ML
INJECTION, SOLUTION EPIDURAL; INFILTRATION; PERINEURAL
Status: COMPLETED
Start: 2023-05-03 | End: 2023-05-03

## 2023-05-03 RX ORDER — FENTANYL CITRATE 50 UG/ML
25-200 INJECTION, SOLUTION INTRAMUSCULAR; INTRAVENOUS
Status: CANCELLED | OUTPATIENT
Start: 2023-05-03

## 2023-05-03 RX ORDER — ROPIVACAINE HYDROCHLORIDE 5 MG/ML
INJECTION, SOLUTION EPIDURAL; INFILTRATION; PERINEURAL
Status: COMPLETED | OUTPATIENT
Start: 2023-05-03 | End: 2023-05-03

## 2023-05-03 RX ORDER — MIDAZOLAM HYDROCHLORIDE 1 MG/ML
INJECTION INTRAMUSCULAR; INTRAVENOUS
Status: COMPLETED
Start: 2023-05-03 | End: 2023-05-03

## 2023-05-03 RX ORDER — MIDAZOLAM HYDROCHLORIDE 1 MG/ML
.5-4 INJECTION INTRAMUSCULAR; INTRAVENOUS
Status: CANCELLED | OUTPATIENT
Start: 2023-05-03

## 2023-05-03 RX ORDER — CEFAZOLIN SODIUM 1 G/3ML
INJECTION, POWDER, FOR SOLUTION INTRAMUSCULAR; INTRAVENOUS
Status: DISCONTINUED | OUTPATIENT
Start: 2023-05-03 | End: 2023-05-03 | Stop reason: HOSPADM

## 2023-05-03 RX ORDER — PHENYLEPHRINE HYDROCHLORIDE 10 MG/ML
INJECTION INTRAVENOUS
Status: DISCONTINUED | OUTPATIENT
Start: 2023-05-03 | End: 2023-05-03

## 2023-05-03 RX ORDER — HEPARIN SODIUM 5000 [USP'U]/ML
INJECTION, SOLUTION INTRAVENOUS; SUBCUTANEOUS
Status: DISCONTINUED | OUTPATIENT
Start: 2023-05-03 | End: 2023-05-03 | Stop reason: HOSPADM

## 2023-05-03 RX ORDER — ONDANSETRON 2 MG/ML
INJECTION INTRAMUSCULAR; INTRAVENOUS
Status: DISCONTINUED | OUTPATIENT
Start: 2023-05-03 | End: 2023-05-03

## 2023-05-03 RX ORDER — HEPARIN SODIUM 1000 [USP'U]/ML
INJECTION, SOLUTION INTRAVENOUS; SUBCUTANEOUS
Status: DISCONTINUED | OUTPATIENT
Start: 2023-05-03 | End: 2023-05-03 | Stop reason: HOSPADM

## 2023-05-03 RX ORDER — PROPOFOL 10 MG/ML
VIAL (ML) INTRAVENOUS CONTINUOUS PRN
Status: DISCONTINUED | OUTPATIENT
Start: 2023-05-03 | End: 2023-05-03

## 2023-05-03 RX ORDER — LIDOCAINE HYDROCHLORIDE 10 MG/ML
INJECTION INFILTRATION; PERINEURAL
Status: DISCONTINUED | OUTPATIENT
Start: 2023-05-03 | End: 2023-05-03 | Stop reason: HOSPADM

## 2023-05-03 RX ADMIN — PHENYLEPHRINE HYDROCHLORIDE 200 MCG: 10 INJECTION INTRAVENOUS at 01:05

## 2023-05-03 RX ADMIN — CEFAZOLIN SODIUM 2 G: 2 SOLUTION INTRAVENOUS at 11:05

## 2023-05-03 RX ADMIN — PHENYLEPHRINE HYDROCHLORIDE 200 MCG: 10 INJECTION INTRAVENOUS at 12:05

## 2023-05-03 RX ADMIN — SODIUM CHLORIDE: 9 INJECTION, SOLUTION INTRAVENOUS at 11:05

## 2023-05-03 RX ADMIN — ONDANSETRON 4 MG: 2 INJECTION INTRAMUSCULAR; INTRAVENOUS at 11:05

## 2023-05-03 RX ADMIN — ROPIVACAINE HYDROCHLORIDE 30 ML: 5 INJECTION, SOLUTION EPIDURAL; INFILTRATION; PERINEURAL at 11:05

## 2023-05-03 RX ADMIN — LIDOCAINE HYDROCHLORIDE 40 MG: 20 INJECTION, SOLUTION EPIDURAL; INFILTRATION; INTRACAUDAL; PERINEURAL at 11:05

## 2023-05-03 RX ADMIN — MIDAZOLAM HYDROCHLORIDE 0.5 MG: 1 INJECTION, SOLUTION INTRAMUSCULAR; INTRAVENOUS at 10:05

## 2023-05-03 RX ADMIN — PHENYLEPHRINE HYDROCHLORIDE 20 MCG/MIN: 10 INJECTION INTRAVENOUS at 01:05

## 2023-05-03 RX ADMIN — PROPOFOL 25 MCG/KG/MIN: 10 INJECTION, EMULSION INTRAVENOUS at 11:05

## 2023-05-03 NOTE — ANESTHESIA PREPROCEDURE EVALUATION
"                                                                                                             05/03/2023  Angel Pablo Sr. is a 77 y.o., male who presents with ESRD w/ need for   Hemodialysis access(Permanent). He currently has access, AV Fistula, which needs revision.  Diagnosis:        Mechanical breakdown of surgically created arteriovenous fistula, subsequent encounter [T82.510D]       End stage kidney disease [N18.6]    The pt. comes to [facility] for the noted procedure under Regional Anesth.(Supraclavicular Blk) w/   IV sedation vs GA/LMA.  Procedures:        REVISION, AV FISTULA (Left) - LEFT UPPER ARM FISTULA REVISION WITH TUNNELED CATHETER INSERTION // VASCULAR // SUPINE // SUPRACLAVICULAR BLOCK       INSERTION, VASCULAR ACCESS CATHETER        PMHx:  Other Medical History   End stage renal disease Mechanical breakdown of surgically created arteriovenous fistula, subsequent encounter   Stroke Dialysis patient   Hypertension Benign prostatic hyperplasia with lower urinary tract symptoms   Dementia Hemiplegia and hemiparesis following cerebral infarction affecting left dominant side   Insomnia Pain in left knee   Presbyopia Repeated falls   Muscle wasting and atrophy, not elsewhere classified, multiple sites Paralytic gait   Other lack of coordination Presence of intraocular lens   Hyperlipidemia Seizures   Insomnia Hypokalemia   H/O prostate cancer      Surgical History:  KNEE SURGERY COLONOSCOPY   FISTULOGRAM            Vital signs:  Pre Vitals     Current as of 05/03/23 0904  BP: 113/73 Pulse: 75   Resp: 16 SpO2: 97   Temp: 36.8 °C (98.3 °F)   Height: 6' 1" (1.854 m) (04/21/23) Weight: 79.2 kg (174 lb 9.6 oz) (04/21/23)   BMI: 23 IBW: 79.9 kg (176 lb 1.7 oz)   Last edited 05/03/23 0819 by GAMAL          Lab Data:      EKG:    Pre-op Assessment    I have reviewed the Patient Summary Reports.     I have reviewed the Nursing Notes. I have reviewed the NPO Status.   I have reviewed the Medications. "     Review of Systems  Anesthesia Hx:  No problems with previous Anesthesia    Social:  Non-Smoker    Hematology/Oncology:  Hematology Normal   Oncology Normal     EENT/Dental:EENT/Dental Normal   Cardiovascular:   Exercise tolerance: good Hypertension  Functional Capacity good / => 4 METS    Pulmonary:  Pulmonary Normal    Renal/:   Chronic Renal Disease, ESRD, Dialysis    Hepatic/GI:  Hepatic/GI Normal    Musculoskeletal:  Musculoskeletal Normal    Neurological:   CVA Neuromuscular Disease, Seizures    Endocrine:  Endocrine Normal    Dermatological:  Skin Normal    Psych:   Psychiatric History depression          Physical Exam  General: Alert, Oriented, Well nourished and Cooperative    Airway:  Mallampati: II   Mouth Opening: Normal  TM Distance: Normal  Tongue: Normal  Neck ROM: Normal ROM    Dental:  Intact    Chest/Lungs:  Clear to auscultation, Normal Respiratory Rate    Heart:  Rate: Normal  Rhythm: Regular Rhythm        Anesthesia Plan  Type of Anesthesia, risks & benefits discussed:    Anesthesia Type: Regional, Gen Natural Airway  Intra-op Monitoring Plan: Standard ASA Monitors  Post Op Pain Control Plan: peripheral nerve block  Induction:  IV  Informed Consent: Informed consent signed with the Patient and all parties understand the risks and agree with anesthesia plan.  All questions answered.   ASA Score: 3  Day of Surgery Review of History & Physical: H&P Update referred to the surgeon/provider.    Ready For Surgery From Anesthesia Perspective.     .       17:04

## 2023-05-03 NOTE — ANESTHESIA PROCEDURE NOTES
Peripheral Block/Supraclavicular Blk    Patient location during procedure: holding area   Block not for primary anesthetic.  Reason for block: at surgeon's request and post-op pain management   Post-op Pain Location: Left Upper Arm, AV Fistula   Start time: 5/3/2023 11:00 AM  Timeout: 5/3/2023 10:55 AM   End time: 5/3/2023 11:03 AM    Staffing  Authorizing Provider: William Núñez MD  Performing Provider: William Núñez MD    Preanesthetic Checklist  Completed: patient identified, IV checked, site marked, risks and benefits discussed, surgical consent, monitors and equipment checked, pre-op evaluation and timeout performed  Peripheral Block  Patient position: supine  Prep: ChloraPrep  Patient monitoring: heart rate, cardiac monitor, continuous pulse ox, continuous capnometry and frequent blood pressure checks  Block type: supraclavicular  Laterality: left  Injection technique: single shot  Needle  Needle type: Stimuplex   Needle gauge: 22 G  Needle length: 4 in  Needle localization: anatomical landmarks, ultrasound guidance and nerve stimulator   -ultrasound image captured on disc.  Assessment  Injection assessment: negative aspiration, negative parasthesia and local visualized surrounding nerve  Paresthesia pain: none  Heart rate change: no  Slow fractionated injection: yes  Pain Tolerance: comfortable throughout block  Medications:    Medications: ropivacaine (NAROPIN) injection 0.5% - Perineural   30 mL - 5/3/2023 11:00:00 AM    Additional Notes      Anatomy of Brachial plexus and needle identified utilizing Ultrasound. With needle in close  Proximity to nerves and neg.aspiration, local anesthetic injected w/o difficulty and injectate  Observed surrounding nerves.         VSS.  [facility] RN monitoring vitals throughout procedure.  Patient tolerated procedure well.

## 2023-05-03 NOTE — TRANSFER OF CARE
"Anesthesia Transfer of Care Note    Patient: Angel Pablo Sr.    Procedure(s) Performed: Procedure(s) (LRB):  EXCISION, AV FISTULA (Left)  INSERTION, VASCULAR ACCESS CATHETER (N/A)    Patient location: OPS    Anesthesia Type: MAC and regional    Transport from OR: Transported from OR on room air with adequate spontaneous ventilation    Post pain: adequate analgesia    Post assessment: no apparent anesthetic complications    Post vital signs: stable    Level of consciousness: awake, alert and oriented    Nausea/Vomiting: no nausea/vomiting    Complications: none    Transfer of care protocol was followed      Last vitals:   Visit Vitals  /63   Pulse 73   Temp 36.8 °C (98.3 °F) (Oral)   Resp 18   Ht 6' 1" (1.854 m)   Wt 79.2 kg (174 lb 9.6 oz)   SpO2 100%   BMI 23.04 kg/m²     "

## 2023-05-03 NOTE — DISCHARGE INSTRUCTIONS
-NO driving and no alcohol consumption for 24 hours and while taking narcotic pain medications.    -ELEVATE affected extremity as needed to aid in pain and inflammation.    -Keep site clean and dry for 48 hours. OK to shower afterwards. Do not submerge incision under water. Dermabond/steri-strips will fall off on its own time. Do not peel off.    -No heavy lifting with affected extremity. Do not lift objects greater than 10lbs.    -Monitor extremity for good circulation. If you received block, it can last 8-12 hours.    -Monitor incision for signs of infection: redness, swelling, drainage/pus/foul odor, fever, chills.    -Report to your nearest ER and/or notify your provider if you experience any SUDDEN/SEVERE chest/abdominal pain, weakness, trouble breathing, uncontrolled pain or bleeding

## 2023-05-03 NOTE — OP NOTE
[facility] - Periop Services  General Surgery  Operative Note    SUMMARY     Date of Procedure: 5/3/2023     Procedure:   Right IJ Tunneled catheter insertion  Excision of left arm aneurysmal fistula    Surgeon(s) and Role:     * Natalie Delatorre MD - Primary    Assisting Surgeon: Mandy Shen NP    Pre-Operative Diagnosis: Mechanical breakdown of surgically created arteriovenous fistula, subsequent encounter [T82.510D]  End stage kidney disease [N18.6]    Post-Operative Diagnosis: Post-Op Diagnosis Codes:     * Mechanical breakdown of surgically created arteriovenous fistula, subsequent encounter [T82.510D]     * End stage kidney disease [N18.6]    Anesthesia: Choice    Operative Findings (including complications, if any): Large aneurysmal left arm fistula    Description of Technical Procedures: Mr. Pablo was taken to the operating room and placed in supine position.  The bilateral neck and chest were prepped and draped in the usual sterile fashion.  Appropriate time-out was performed.  B-mode ultrasound was utilized to identify the right internal jugular vein which was easily compressible.  1% lidocaine was injected in the subcutaneous tissues and a needle was utilized to cannulate the vein.  A wire was advanced serial dilation was performed.  A peel-away introducer was placed.  The appropriately measured the catheter length with the assistance of fluoroscopy and shows an appropriate catheter exit site on the chest wall.  Additional lidocaine was used for anesthetic to the chest wall.  Small incision was made at the intended catheter exit site antegrade tunneling of a 23 cm palindrome catheter was performed it was passed through a peel-away introducer.  Catheter tip was advanced just inside the right atrium.  Positioning was assisting confirmed with fluoroscopy.  Images were retained.  Both lumens of the catheter were flushed and aspirated with the heparin saline solution and then locked  with 1000 unit/cc heparin solution.  Appropriate caps were placed.  2-0 silk sutures were utilized to fix the catheter to the chest wall 4-0 Vicryl suture was utilized to close the jugular access site in the neck.  Dermabond dressing was placed at this site chlorhexidine impregnated Tegaderm was placed at the catheter exit site.    The left arm was then prepped and draped in the usual sterile fashion. An approximately 14 cm eye-slit shaped incision was made over the aneurysmal fistula.  It was about 4 cm in width in its midportion.  Dissection was performed down to the level of the fistula and the fistula was mobilized circumferentially.  We initially made consideration for revision of the fistula or bypass around the affected area but there was significant size mismatch and we had no suitable leak sized artergraft.  Moreover I feared continued prolonged bleeding issues from this access and felt like his current nursing home resident limited good surveillance.  Ultimately I decided to excise the fistula.  After mobilizing long segment of the fistula in the upper arm both ends were ligated utilizing 2-0 silk stick tie sutures.  Two of these sutures were used on the proximal stump.  Good hemostasis was obtained.  The wound was then closed with 3-0 Vicryl suture and 4-0 Monocryl suture followed by Dermabond Prineo dressing.  Kerlix and Ace wrap were applied.    Significant Surgical Tasks Conducted by the Assistant(s), if Applicable: Assisted with exposing large aneurysmal fistula;  Performed skin closure;     Estimated Blood Loss (EBL): * No values recorded between 5/3/2023 12:00 PM and 5/3/2023  2:00 PM *           Implants:   Implant Name Type Inv. Item Serial No.  Lot No. LRB No. Used Action   KIT CATH PALINDROME [id number] - [id number]  KIT CATH PALINDROME [id number]  [employer] [id number] Right 1 Implanted       Specimens:   Specimen (24h ago, onward)       Start     Ordered    05/03/23 9790  Specimen to  Pathology  RELEASE UPON ORDERING        References:    Click here for ordering Quick Tip   Question:  Release to patient  Answer:  Immediate    05/03/23 4624

## 2023-05-04 LAB — PSYCHE PATHOLOGY RESULT: NORMAL

## 2023-05-08 ENCOUNTER — TELEPHONE (OUTPATIENT)
Dept: VASCULAR SURGERY | Facility: CLINIC | Age: 78
End: 2023-05-08

## 2023-05-09 NOTE — ANESTHESIA POSTPROCEDURE EVALUATION
Anesthesia Post Evaluation    Patient: Angel Pablo     Procedure(s) Performed: Procedure(s) (LRB):  EXCISION, AV FISTULA (Left)  INSERTION, VASCULAR ACCESS CATHETER (N/A)    Final Anesthesia Type: regional      Patient location during evaluation: OPS  Patient participation: Yes- Able to Participate  Level of consciousness: awake and alert and oriented  Post-procedure vital signs: reviewed and stable  Pain management: adequate  Airway patency: patent    PONV status at discharge: No PONV, nausea (controlled) and vomiting (controlled)  Anesthetic complications: no      Cardiovascular status: blood pressure returned to baseline and stable  Respiratory status: unassisted  Hydration status: euvolemic  Follow-up not needed.          Vitals Value Taken Time   /74 05/03/23 1429   Temp 36.4 °C (97.5 °F) 05/03/23 1417   Pulse 75 05/03/23 1429   Resp ** 05/09/23 1357   SpO2 95 % 05/03/23 1429         No case tracking events are documented in the log.      Pain/Imtiaz Score: No data recorded

## 2023-05-24 NOTE — PROGRESS NOTES
[facility] Vascular - Clinic Note  Natalie Delatorre MD      Patient Name: Angel Pablo Sr.     MRN: [id number]   Visit Date: 05/25/2023    Patient Care Team:  Prieto Vale MD as PCP - General (Internal Medicine)  [facility] (Dialysis Center)  [facility] as Dialysis Nurse (Dialysis Center)  [facility] (Skilled Nursing)    Subjective:         Hemodialysis Access       HPI: Mr. Pablo presents to the clinic for 6 week follow up s/p left arm fistula excision on 5/3/23 secondary to concerning skin changes and scabbing overlying the fistula. He denies left hand numbness or pain since surgery. He denies swelling to his left arm. He denies signs or symptoms of infection to his left arm incision. He is currently using a right chest wall tunneled catheter for dialysis without complication.       Past Medical History:   Diagnosis Date    Benign prostatic hyperplasia with lower urinary tract symptoms     Dementia     Dialysis patient     M/W/F    End stage renal disease     H/O prostate cancer     Hemiplegia and hemiparesis following cerebral infarction affecting left dominant side     Hyperlipidemia     Hypertension     Hypokalemia     Insomnia     Insomnia     Mechanical breakdown of surgically created arteriovenous fistula, subsequent encounter     Muscle wasting and atrophy, not elsewhere classified, multiple sites     Other lack of coordination     Pain in left knee     Paralytic gait     Presbyopia     Presence of intraocular lens     Repeated falls     Seizures     Stroke      Past Surgical History:   Procedure Laterality Date    COLONOSCOPY      EXCISION OF ARTERIOVENOUS FISTULA Left 5/3/2023    Procedure: EXCISION, AV FISTULA;  Surgeon: Natalie Delatorre MD;  Location: [facility];  Service: Peripheral Vascular;  Laterality: Left;  LEFT UPPER ARM FISTULA REVISION WITH TUNNELED CATHETER INSERTION // VASCULAR // SUPINE // SUPRACLAVICULAR BLOCK    FISTULOGRAM N/A 4/20/2023    Procedure:  Fistulogram;  Surgeon: Akil Escobar DO;  Location: [facility] CATH LAB;  Service: Nephrology;  Laterality: N/A;  LUE FISTULOGRAM    INSERTION, VASCULAR ACCESS CATHETER N/A 5/3/2023    Procedure: INSERTION, VASCULAR ACCESS CATHETER;  Surgeon: Natalie Delatorre MD;  Location: [facility] OR;  Service: Peripheral Vascular;  Laterality: N/A;    KNEE SURGERY Left      History reviewed. No pertinent family history.  Social History     Socioeconomic History    Marital status: Single   Tobacco Use    Smoking status: Never    Smokeless tobacco: Never   Substance and Sexual Activity    Alcohol use: Not Currently    Drug use: Never    Sexual activity: Not Currently   Social History Narrative    ** Merged History Encounter **          Current Outpatient Medications   Medication Instructions    acetaminophen (TYLENOL) 650 mg, Oral, Every 4 hours PRN    ascorbic acid (vitamin C) (VITAMIN C) 1,000 mg, Oral, Daily    aspirin (ECOTRIN) 81 mg, Oral, Daily    atorvastatin (LIPITOR) 40 mg, Oral, Nightly    B complex-vitamin C-folic acid (MARIE-JAJA/NEPHRO-JAJA) 0.8 mg Tab 1 tablet, Oral, Daily    levETIRAcetam (KEPPRA) 500 mg, Oral, 2 times daily    melatonin (MELATIN) 10 mg, Oral, Nightly    memantine (NAMENDA) 5 mg, Oral, 2 times daily    mirabegron (MYRBETRIQ) 50 mg Tb24 1 tablet, Oral, Daily    ondansetron (ZOFRAN-ODT) 4 mg, Oral, Every 6 hours PRN    potassium chloride (KLOR-CON) 10 MEQ TbSR 10 mEq, Oral, Once, Every Monday, Tuesday, Wednesday, Thursday, Friday    tamsulosin (FLOMAX) 0.8 mg, Oral, Nightly    trazodone (DESYREL) 25 mg, Oral, Nightly     Review of patient's allergies indicates:  No Known Allergies        REVIEW OF SYSTEMS:  ROS  12 point review of systems conducted, negative except as stated in the history of present illness. See HPI for details.      Objective:     PHYSICAL EXAM:   Visit Vitals  /65 (BP Location: Right arm)   Pulse 80       Vascular Physical Exam  Vitals and nursing note reviewed.   Constitutional:        General: He is not in acute distress.     Comments: Presents in wheelchair   HENT:      Head: Normocephalic.      Nose: Nose normal.   Cardiovascular:      Rate and Rhythm: Normal rate and regular rhythm.      Pulses:           Radial pulses are 2+ on the left side.   Abdominal:      General: There is no distension.      Tenderness: There is no abdominal tenderness.   Musculoskeletal:      Right lower leg: No edema.      Left lower leg: No edema.   Lymphadenopathy:      Cervical: No cervical adenopathy.   Neurological:      General: No focal deficit present.      Mental Status: He is alert.      Cranial Nerves: No cranial nerve deficit.   Psychiatric:         Mood and Affect: Mood normal.   Arteriovenous access:     Catheters/Devices: Right chest tunneled dialysis catheter present.        Post Operative Incision:    Location: left upper arm   Inspection: well approximated and healing well         Imaging Obtained/Reviewed:         Assessment/Plan:     Mr. Pablo is a 77 y.o. man with end stage renal failure who is s/p left brachiocephalic fistula excision on 5/3/23. His left upper arm incision is healing appropriately with well-approximated wound edges. Will allow 2-3 more weeks for further healing of his incision. Will have him back to obtain left arm vein mapping to determine suitability for new access creation.          1. ESRD (end stage renal disease)    2. Other specified pre-operative examination         No follow-ups on file. In addition to their scheduled follow up, the patient has also been instructed to follow up on as needed basis.     No future appointments.

## 2023-05-25 ENCOUNTER — OFFICE VISIT (OUTPATIENT)
Dept: VASCULAR SURGERY | Facility: CLINIC | Age: 78
End: 2023-05-25
Payer: MEDICARE

## 2023-05-25 VITALS — SYSTOLIC BLOOD PRESSURE: 103 MMHG | HEART RATE: 80 BPM | DIASTOLIC BLOOD PRESSURE: 65 MMHG

## 2023-05-25 DIAGNOSIS — Z01.818 OTHER SPECIFIED PRE-OPERATIVE EXAMINATION: ICD-10-CM

## 2023-05-25 DIAGNOSIS — N18.6 ESRD (END STAGE RENAL DISEASE): Primary | ICD-10-CM

## 2023-05-25 PROCEDURE — 3288F FALL RISK ASSESSMENT DOCD: CPT | Mod: CPTII,,, | Performed by: SPECIALIST

## 2023-05-25 PROCEDURE — 99024 POSTOP FOLLOW-UP VISIT: CPT | Mod: ,,, | Performed by: SPECIALIST

## 2023-05-25 PROCEDURE — 3078F PR MOST RECENT DIASTOLIC BLOOD PRESSURE < 80 MM HG: ICD-10-PCS | Mod: CPTII,,, | Performed by: SPECIALIST

## 2023-05-25 PROCEDURE — 99024 PR POST-OP FOLLOW-UP VISIT: ICD-10-PCS | Mod: ,,, | Performed by: SPECIALIST

## 2023-05-25 PROCEDURE — 1160F RVW MEDS BY RX/DR IN RCRD: CPT | Mod: CPTII,,, | Performed by: SPECIALIST

## 2023-05-25 PROCEDURE — 1101F PR PT FALLS ASSESS DOC 0-1 FALLS W/OUT INJ PAST YR: ICD-10-PCS | Mod: CPTII,,, | Performed by: SPECIALIST

## 2023-05-25 PROCEDURE — 3074F PR MOST RECENT SYSTOLIC BLOOD PRESSURE < 130 MM HG: ICD-10-PCS | Mod: CPTII,,, | Performed by: SPECIALIST

## 2023-05-25 PROCEDURE — 3288F PR FALLS RISK ASSESSMENT DOCUMENTED: ICD-10-PCS | Mod: CPTII,,, | Performed by: SPECIALIST

## 2023-05-25 PROCEDURE — 1159F MED LIST DOCD IN RCRD: CPT | Mod: CPTII,,, | Performed by: SPECIALIST

## 2023-05-25 PROCEDURE — 3078F DIAST BP <80 MM HG: CPT | Mod: CPTII,,, | Performed by: SPECIALIST

## 2023-05-25 PROCEDURE — 3074F SYST BP LT 130 MM HG: CPT | Mod: CPTII,,, | Performed by: SPECIALIST

## 2023-05-25 PROCEDURE — 1159F PR MEDICATION LIST DOCUMENTED IN MEDICAL RECORD: ICD-10-PCS | Mod: CPTII,,, | Performed by: SPECIALIST

## 2023-05-25 PROCEDURE — 1160F PR REVIEW ALL MEDS BY PRESCRIBER/CLIN PHARMACIST DOCUMENTED: ICD-10-PCS | Mod: CPTII,,, | Performed by: SPECIALIST

## 2023-05-25 PROCEDURE — 1101F PT FALLS ASSESS-DOCD LE1/YR: CPT | Mod: CPTII,,, | Performed by: SPECIALIST

## 2023-06-21 NOTE — PROGRESS NOTES
[facility] Vascular - Clinic Note  Natalie Delatorre MD      Patient Name: Angel Pablo Sr.     MRN: [id number]   Visit Date: 06/21/2023    Patient Care Team:  Prieto Vale MD as PCP - General (Internal Medicine)  [facility] (Dialysis Center)  [facility] as Dialysis Nurse (Dialysis Center)  [facility] (Skilled Nursing)    Subjective:         No chief complaint on file.      HPI:   Mr. Pablo presents today to discuss new access creation to his left arm. Vein mapping obtained. He is s/p left brachiocephalic fistula excision on 5/3/2023. He denies any recent hospitalizations, MI, stroke or cardiac stenting. He is ____ handed. He is currently using a right chest wall tunneled catheter without complications.     Past Medical History:   Diagnosis Date    Benign prostatic hyperplasia with lower urinary tract symptoms     Dementia     Dialysis patient     M/W/F    End stage renal disease     H/O prostate cancer     Hemiplegia and hemiparesis following cerebral infarction affecting left dominant side     Hyperlipidemia     Hypertension     Hypokalemia     Insomnia     Insomnia     Mechanical breakdown of surgically created arteriovenous fistula, subsequent encounter     Muscle wasting and atrophy, not elsewhere classified, multiple sites     Other lack of coordination     Pain in left knee     Paralytic gait     Presbyopia     Presence of intraocular lens     Repeated falls     Seizures     Stroke      Past Surgical History:   Procedure Laterality Date    COLONOSCOPY      EXCISION OF ARTERIOVENOUS FISTULA Left 5/3/2023    Procedure: EXCISION, AV FISTULA;  Surgeon: Natalie Delatorre MD;  Location: [facility] OR;  Service: Peripheral Vascular;  Laterality: Left;  LEFT UPPER ARM FISTULA REVISION WITH TUNNELED CATHETER INSERTION // VASCULAR // SUPINE // SUPRACLAVICULAR BLOCK    FISTULOGRAM N/A 4/20/2023    Procedure: Fistulogram;  Surgeon: Akil Escobar DO;  Location: [facility] CATH LAB;  Service:  Nephrology;  Laterality: N/A;  LUE FISTULOGRAM    INSERTION, VASCULAR ACCESS CATHETER N/A 5/3/2023    Procedure: INSERTION, VASCULAR ACCESS CATHETER;  Surgeon: Natalie Delatorre MD;  Location: [facility];  Service: Peripheral Vascular;  Laterality: N/A;    KNEE SURGERY Left      No family history on file.  Social History     Socioeconomic History    Marital status: Single   Tobacco Use    Smoking status: Never    Smokeless tobacco: Never   Substance and Sexual Activity    Alcohol use: Not Currently    Drug use: Never    Sexual activity: Not Currently   Social History Narrative    ** Merged History Encounter **          Current Outpatient Medications   Medication Instructions    acetaminophen (TYLENOL) 650 mg, Oral, Every 4 hours PRN    ascorbic acid (vitamin C) (VITAMIN C) 1,000 mg, Oral, Daily    aspirin (ECOTRIN) 81 mg, Oral, Daily    atorvastatin (LIPITOR) 40 mg, Oral, Nightly    B complex-vitamin C-folic acid (MARIE-JAJA/NEPHRO-JAJA) 0.8 mg Tab 1 tablet, Oral, Daily    levETIRAcetam (KEPPRA) 500 mg, Oral, 2 times daily    melatonin (MELATIN) 10 mg, Oral, Nightly    memantine (NAMENDA) 5 mg, Oral, 2 times daily    mirabegron (MYRBETRIQ) 50 mg Tb24 1 tablet, Oral, Daily    ondansetron (ZOFRAN-ODT) 4 mg, Oral, Every 6 hours PRN    potassium chloride (KLOR-CON) 10 MEQ TbSR 10 mEq, Oral, Once, Every Monday, Tuesday, Wednesday, Thursday, Friday    tamsulosin (FLOMAX) 0.8 mg, Oral, Nightly    trazodone (DESYREL) 25 mg, Oral, Nightly     Review of patient's allergies indicates:  No Known Allergies        REVIEW OF SYSTEMS:  ROS  12 point review of systems conducted, negative except as stated in the history of present illness. See HPI for details.      Objective:     PHYSICAL EXAM:   There were no vitals taken for this visit.    Vascular Physical Exam          Imaging Obtained/Reviewed:   Study:   Date:         Assessment/Plan:     Mr. Pablo is a 77 y.o. man with end stage renal failure who is in need of permanent access  creation. He is currently using a right chest wall  catheter without difficulty. {History-no history:64726} of a pacemaker, defibrillator, or mediport. Vein mapping obtained today demonstrates anatomy suitable for a LEFT ARM ACCESS CREATION- LIKELY {HD ACCCESS - ALL CAPS:70650}. We discussed the risks and benefits of surgery at length including the risks of bleeding, infection, failure of access to mature, neurovascular injury, and/or steal syndrome. He wishes to proceed.           1. End stage renal disease         No follow-ups on file. In addition to their scheduled follow up, the patient has also been instructed to follow up on as needed basis.     Future Appointments   Date Time Provider Department Center   6/22/2023 10:30 AM [facility]   6/22/2023 11:00 AM Natalie Delatorre MD [facility]

## 2023-06-22 ENCOUNTER — TELEPHONE (OUTPATIENT)
Dept: VASCULAR SURGERY | Facility: CLINIC | Age: 78
End: 2023-06-22

## 2023-06-22 ENCOUNTER — OFFICE VISIT (OUTPATIENT)
Dept: VASCULAR SURGERY | Facility: CLINIC | Age: 78
End: 2023-06-22
Payer: MEDICARE

## 2023-06-22 VITALS — SYSTOLIC BLOOD PRESSURE: 96 MMHG | DIASTOLIC BLOOD PRESSURE: 62 MMHG | HEART RATE: 71 BPM

## 2023-06-22 DIAGNOSIS — N18.6 END STAGE RENAL DISEASE: Primary | ICD-10-CM

## 2023-06-22 DIAGNOSIS — N18.6 END STAGE RENAL DISEASE: ICD-10-CM

## 2023-06-22 DIAGNOSIS — N18.6 ESRD (END STAGE RENAL DISEASE): Primary | ICD-10-CM

## 2023-06-22 PROCEDURE — 3288F PR FALLS RISK ASSESSMENT DOCUMENTED: ICD-10-PCS | Mod: CPTII,,, | Performed by: SPECIALIST

## 2023-06-22 PROCEDURE — 3288F FALL RISK ASSESSMENT DOCD: CPT | Mod: CPTII,,, | Performed by: SPECIALIST

## 2023-06-22 PROCEDURE — 99024 PR POST-OP FOLLOW-UP VISIT: ICD-10-PCS | Mod: ,,, | Performed by: SPECIALIST

## 2023-06-22 PROCEDURE — 1101F PR PT FALLS ASSESS DOC 0-1 FALLS W/OUT INJ PAST YR: ICD-10-PCS | Mod: CPTII,,, | Performed by: SPECIALIST

## 2023-06-22 PROCEDURE — 1160F RVW MEDS BY RX/DR IN RCRD: CPT | Mod: CPTII,,, | Performed by: SPECIALIST

## 2023-06-22 PROCEDURE — 3074F SYST BP LT 130 MM HG: CPT | Mod: CPTII,,, | Performed by: SPECIALIST

## 2023-06-22 PROCEDURE — 1159F PR MEDICATION LIST DOCUMENTED IN MEDICAL RECORD: ICD-10-PCS | Mod: CPTII,,, | Performed by: SPECIALIST

## 2023-06-22 PROCEDURE — 3074F PR MOST RECENT SYSTOLIC BLOOD PRESSURE < 130 MM HG: ICD-10-PCS | Mod: CPTII,,, | Performed by: SPECIALIST

## 2023-06-22 PROCEDURE — 1159F MED LIST DOCD IN RCRD: CPT | Mod: CPTII,,, | Performed by: SPECIALIST

## 2023-06-22 PROCEDURE — 3078F DIAST BP <80 MM HG: CPT | Mod: CPTII,,, | Performed by: SPECIALIST

## 2023-06-22 PROCEDURE — 1101F PT FALLS ASSESS-DOCD LE1/YR: CPT | Mod: CPTII,,, | Performed by: SPECIALIST

## 2023-06-22 PROCEDURE — 3078F PR MOST RECENT DIASTOLIC BLOOD PRESSURE < 80 MM HG: ICD-10-PCS | Mod: CPTII,,, | Performed by: SPECIALIST

## 2023-06-22 PROCEDURE — 1160F PR REVIEW ALL MEDS BY PRESCRIBER/CLIN PHARMACIST DOCUMENTED: ICD-10-PCS | Mod: CPTII,,, | Performed by: SPECIALIST

## 2023-06-22 PROCEDURE — 99024 POSTOP FOLLOW-UP VISIT: CPT | Mod: ,,, | Performed by: SPECIALIST

## 2023-06-22 RX ORDER — HYDROCODONE BITARTRATE AND ACETAMINOPHEN 7.5; 325 MG/1; MG/1
1 TABLET ORAL EVERY 6 HOURS PRN
Qty: 28 TABLET | Refills: 0 | Status: ON HOLD | OUTPATIENT
Start: 2023-06-22 | End: 2023-08-22

## 2023-06-22 RX ORDER — SODIUM CHLORIDE 9 MG/ML
INJECTION, SOLUTION INTRAVENOUS CONTINUOUS
Status: CANCELLED | OUTPATIENT
Start: 2023-07-03

## 2023-06-22 NOTE — TELEPHONE ENCOUNTER
Left a message with Dr. Remy's office in regards to referral that was sent. Message left to follow up on if patient was being called to get scheduled for an appointment. Notified Mr. Arriaga that message was left and to call our office tomorrow if call not received from Dr. Remy's office. Patient verbalized understanding of all instructions.

## 2023-06-22 NOTE — H&P (VIEW-ONLY)
[facility] Vascular - Clinic Note  Natalie Delatorre MD      Patient Name: Angel Pablo Sr.                   MRN: [id number]   Visit Date: 6/22/2023          History Present Illness     Reason for Visit: Hemodialysis Access   Mr. Pablo presents today to discuss new access creation to his left arm. Vein mapping obtained. He is s/p left brachiocephalic fistula excision on 5/3/2023. He denies any recent hospitalizations, MI, stroke or cardiac stenting. He is right handed. He is currently using a right chest wall tunneled catheter without complications. He denies any recent hospitalization, MI, stroke or cardiac stenting. He reports weakness to his left arm/leg s/t prior stroke      REVIEW OF SYSTEMS:  ROS  12 point review of systems conducted, negative except as stated in the history of present illness. See HPI for details.        Physical Exam      Visit Vitals  BP 96/62 (BP Location: Right arm)   Pulse 71       General: no acute distress, in a wheelchair, alert, pleasant, and oriented  Neurologic: cranial nerves are grossly intact, no neurologic deficits  HEENT: grossly normal and no scleral icterus  Neck/Chest: normal  and soft without lymphadenopathy  Respiratory: breathing easily and without respiratory distress  Abdomen: normal and soft  Cardiology: regular rate and rhythm    Upper Extremity Arterial Exam:   Right - radial is non-palpable  Left - radial is palpable    Dialysis Access:  right chest wall tunneled catheter  Assessment:     Musculoskeletal:   Upper Extremity: normal left hand function, left hand is warm   Lower Extremity: no edema present to bilateral lower extremities    Skin: has no obvious skin abnormality     Left upper arm incision is well-healed.        Assessment and Plan     Mr. Pablo is a 77 y.o. with end stage renal failure who is in need of permanent access creation. He is currently using a right chest wall  catheter without difficulty. There is no history of of a pacemaker,  defibrillator, or mediport. Vein mapping obtained today demonstrates anatomy suitable for a LEFT ARM ACCESS CREATION- LIKELY RADIOCEPHALIC FISTULA CREATION versus BASILIC TRANSPOSITION. We discussed the risks and benefits of surgery - will discuss again with family on day on surgery. He wishes to proceed.        1. End stage renal disease           Imaging Obtained/Reviewed   Study: left upper extremity vein mapping  Date:   6/22/23           Medical History     Past Medical History:   Diagnosis Date    Benign prostatic hyperplasia with lower urinary tract symptoms     Dementia     Dialysis patient     M/W/F    End stage renal disease     H/O prostate cancer     Hemiplegia and hemiparesis following cerebral infarction affecting left dominant side     Hyperlipidemia     Hypertension     Hypokalemia     Insomnia     Insomnia     Mechanical breakdown of surgically created arteriovenous fistula, subsequent encounter     Muscle wasting and atrophy, not elsewhere classified, multiple sites     Other lack of coordination     Pain in left knee     Paralytic gait     Presbyopia     Presence of intraocular lens     Repeated falls     Seizures     Stroke      Past Surgical History:   Procedure Laterality Date    COLONOSCOPY      EXCISION OF ARTERIOVENOUS FISTULA Left 5/3/2023    Procedure: EXCISION, AV FISTULA;  Surgeon: Natalie Delatorre MD;  Location: [facility] OR;  Service: Peripheral Vascular;  Laterality: Left;  LEFT UPPER ARM FISTULA REVISION WITH TUNNELED CATHETER INSERTION // VASCULAR // SUPINE // SUPRACLAVICULAR BLOCK    FISTULOGRAM N/A 4/20/2023    Procedure: Fistulogram;  Surgeon: Akil Escobar DO;  Location: [facility] CATH LAB;  Service: Nephrology;  Laterality: N/A;  LUE FISTULOGRAM    INSERTION, VASCULAR ACCESS CATHETER N/A 5/3/2023    Procedure: INSERTION, VASCULAR ACCESS CATHETER;  Surgeon: Natalie Delatorre MD;  Location: [facility] OR;  Service: Peripheral Vascular;  Laterality: N/A;    KNEE  SURGERY Left      History reviewed. No pertinent family history.  Social History     Socioeconomic History    Marital status: Single   Tobacco Use    Smoking status: Never    Smokeless tobacco: Never   Substance and Sexual Activity    Alcohol use: Not Currently    Drug use: Never    Sexual activity: Not Currently   Social History Narrative    ** Merged History Encounter **          Current Outpatient Medications   Medication Instructions    acetaminophen (TYLENOL) 650 mg, Oral, Every 4 hours PRN    ascorbic acid (vitamin C) (VITAMIN C) 1,000 mg, Oral, Daily    aspirin (ECOTRIN) 81 mg, Oral, Daily    atorvastatin (LIPITOR) 40 mg, Oral, Nightly    B complex-vitamin C-folic acid (MARIE-JAJA/NEPHRO-JAJA) 0.8 mg Tab 1 tablet, Oral, Daily    levETIRAcetam (KEPPRA) 500 mg, Oral, 2 times daily    melatonin (MELATIN) 10 mg, Oral, Nightly    memantine (NAMENDA) 5 mg, Oral, 2 times daily    mirabegron (MYRBETRIQ) 50 mg Tb24 1 tablet, Oral, Daily    ondansetron (ZOFRAN-ODT) 4 mg, Oral, Every 6 hours PRN    potassium chloride (KLOR-CON) 10 MEQ TbSR 10 mEq, Oral, Once, Every Monday, Tuesday, Wednesday, Thursday, Friday    tamsulosin (FLOMAX) 0.8 mg, Oral, Nightly    trazodone (DESYREL) 25 mg, Oral, Nightly     Review of patient's allergies indicates:  No Known Allergies    Patient Care Team:  Prieto Vale MD as PCP - General (Internal Medicine)  [facility] (Dialysis Center)  [facility] as Dialysis Nurse (Dialysis Center)  [place] (Skilled Nursing)      No follow-ups on file. In addition to their scheduled follow up, the patient has also been instructed to follow up on as needed basis.     No future appointments.

## 2023-06-22 NOTE — PROGRESS NOTES
[facility] Vascular - Clinic Note  Natalie Delatorre MD      Patient Name: Angel Pablo Sr.                   MRN: [id number]   Visit Date: 6/22/2023          History Present Illness     Reason for Visit: Hemodialysis Access   Mr. Pablo presents today to discuss new access creation to his left arm. Vein mapping obtained. He is s/p left brachiocephalic fistula excision on 5/3/2023. He denies any recent hospitalizations, MI, stroke or cardiac stenting. He is right handed. He is currently using a right chest wall tunneled catheter without complications. He denies any recent hospitalization, MI, stroke or cardiac stenting. He reports weakness to his left arm/leg s/t prior stroke      REVIEW OF SYSTEMS:  ROS  12 point review of systems conducted, negative except as stated in the history of present illness. See HPI for details.        Physical Exam      Visit Vitals  BP 96/62 (BP Location: Right arm)   Pulse 71       General: no acute distress, in a wheelchair, alert, pleasant, and oriented  Neurologic: cranial nerves are grossly intact, no neurologic deficits  HEENT: grossly normal and no scleral icterus  Neck/Chest: normal  and soft without lymphadenopathy  Respiratory: breathing easily and without respiratory distress  Abdomen: normal and soft  Cardiology: regular rate and rhythm    Upper Extremity Arterial Exam:   Right - radial is non-palpable  Left - radial is palpable    Dialysis Access:  right chest wall tunneled catheter  Assessment:     Musculoskeletal:   Upper Extremity: normal left hand function, left hand is warm   Lower Extremity: no edema present to bilateral lower extremities    Skin: has no obvious skin abnormality     Left upper arm incision is well-healed.        Assessment and Plan     Mr. Pablo is a 77 y.o. with end stage renal failure who is in need of permanent access creation. He is currently using a right chest wall  catheter without difficulty. There is no history of of a pacemaker,  defibrillator, or mediport. Vein mapping obtained today demonstrates anatomy suitable for a LEFT ARM ACCESS CREATION- LIKELY RADIOCEPHALIC FISTULA CREATION versus BASILIC TRANSPOSITION. We discussed the risks and benefits of surgery - will discuss again with family on day on surgery. He wishes to proceed.        1. End stage renal disease           Imaging Obtained/Reviewed   Study: left upper extremity vein mapping  Date:   6/22/23           Medical History     Past Medical History:   Diagnosis Date    Benign prostatic hyperplasia with lower urinary tract symptoms     Dementia     Dialysis patient     M/W/F    End stage renal disease     H/O prostate cancer     Hemiplegia and hemiparesis following cerebral infarction affecting left dominant side     Hyperlipidemia     Hypertension     Hypokalemia     Insomnia     Insomnia     Mechanical breakdown of surgically created arteriovenous fistula, subsequent encounter     Muscle wasting and atrophy, not elsewhere classified, multiple sites     Other lack of coordination     Pain in left knee     Paralytic gait     Presbyopia     Presence of intraocular lens     Repeated falls     Seizures     Stroke      Past Surgical History:   Procedure Laterality Date    COLONOSCOPY      EXCISION OF ARTERIOVENOUS FISTULA Left 5/3/2023    Procedure: EXCISION, AV FISTULA;  Surgeon: Natalie Delatorre MD;  Location: [facility] OR;  Service: Peripheral Vascular;  Laterality: Left;  LEFT UPPER ARM FISTULA REVISION WITH TUNNELED CATHETER INSERTION // VASCULAR // SUPINE // SUPRACLAVICULAR BLOCK    FISTULOGRAM N/A 4/20/2023    Procedure: Fistulogram;  Surgeon: Akil Escobar DO;  Location: [facility] CATH LAB;  Service: Nephrology;  Laterality: N/A;  LUE FISTULOGRAM    INSERTION, VASCULAR ACCESS CATHETER N/A 5/3/2023    Procedure: INSERTION, VASCULAR ACCESS CATHETER;  Surgeon: Natalie Delatorre MD;  Location: [facility] OR;  Service: Peripheral Vascular;  Laterality: N/A;    KNEE SURGERY Left      History  reviewed. No pertinent family history.  Social History     Socioeconomic History    Marital status: Single   Tobacco Use    Smoking status: Never    Smokeless tobacco: Never   Substance and Sexual Activity    Alcohol use: Not Currently    Drug use: Never    Sexual activity: Not Currently   Social History Narrative    ** Merged History Encounter **          Current Outpatient Medications   Medication Instructions    acetaminophen (TYLENOL) 650 mg, Oral, Every 4 hours PRN    ascorbic acid (vitamin C) (VITAMIN C) 1,000 mg, Oral, Daily    aspirin (ECOTRIN) 81 mg, Oral, Daily    atorvastatin (LIPITOR) 40 mg, Oral, Nightly    B complex-vitamin C-folic acid (MARIE-JAJA/NEPHRO-JAJA) 0.8 mg Tab 1 tablet, Oral, Daily    levETIRAcetam (KEPPRA) 500 mg, Oral, 2 times daily    melatonin (MELATIN) 10 mg, Oral, Nightly    memantine (NAMENDA) 5 mg, Oral, 2 times daily    mirabegron (MYRBETRIQ) 50 mg Tb24 1 tablet, Oral, Daily    ondansetron (ZOFRAN-ODT) 4 mg, Oral, Every 6 hours PRN    potassium chloride (KLOR-CON) 10 MEQ TbSR 10 mEq, Oral, Once, Every Monday, Tuesday, Wednesday, Thursday, Friday    tamsulosin (FLOMAX) 0.8 mg, Oral, Nightly    trazodone (DESYREL) 25 mg, Oral, Nightly     Review of patient's allergies indicates:  No Known Allergies    Patient Care Team:  Prieto Vale MD as PCP - General (Internal Medicine)  [facility] (Dialysis Center)  [facility] as Dialysis Nurse (Dialysis Center)  [facility] (Skilled Nursing)      No follow-ups on file. In addition to their scheduled follow up, the patient has also been instructed to follow up on as needed basis.     No future appointments.

## 2023-06-23 ENCOUNTER — LAB REQUISITION (OUTPATIENT)
Dept: LAB | Facility: HOSPITAL | Age: 78
End: 2023-06-23
Payer: MEDICARE

## 2023-06-23 DIAGNOSIS — N18.6 END STAGE RENAL DISEASE: ICD-10-CM

## 2023-06-23 LAB
ANION GAP SERPL CALC-SCNC: 11 MEQ/L
BASOPHILS # BLD AUTO: 0.02 X10(3)/MCL
BASOPHILS NFR BLD AUTO: 0.4 %
BUN SERPL-MCNC: 35.5 MG/DL (ref 8.4–25.7)
CALCIUM SERPL-MCNC: 9.1 MG/DL (ref 8.8–10)
CHLORIDE SERPL-SCNC: 101 MMOL/L (ref 98–107)
CO2 SERPL-SCNC: 28 MMOL/L (ref 23–31)
CREAT SERPL-MCNC: 6.18 MG/DL (ref 0.73–1.18)
CREAT/UREA NIT SERPL: 6
EOSINOPHIL # BLD AUTO: 0.24 X10(3)/MCL (ref 0–0.9)
EOSINOPHIL NFR BLD AUTO: 4.3 %
ERYTHROCYTE [DISTWIDTH] IN BLOOD BY AUTOMATED COUNT: 16.7 % (ref 11.5–17)
GFR SERPLBLD CREATININE-BSD FMLA CKD-EPI: 9 MLS/MIN/1.73/M2
GLUCOSE SERPL-MCNC: 85 MG/DL (ref 82–115)
HCT VFR BLD AUTO: 24.8 % (ref 42–52)
HGB BLD-MCNC: 8.2 G/DL (ref 14–18)
IMM GRANULOCYTES # BLD AUTO: 0.01 X10(3)/MCL (ref 0–0.04)
IMM GRANULOCYTES NFR BLD AUTO: 0.2 %
LYMPHOCYTES # BLD AUTO: 1.22 X10(3)/MCL (ref 0.6–4.6)
LYMPHOCYTES NFR BLD AUTO: 22 %
MCH RBC QN AUTO: 35.2 PG (ref 27–31)
MCHC RBC AUTO-ENTMCNC: 33.1 G/DL (ref 33–36)
MCV RBC AUTO: 106.4 FL (ref 80–94)
MONOCYTES # BLD AUTO: 0.5 X10(3)/MCL (ref 0.1–1.3)
MONOCYTES NFR BLD AUTO: 9 %
NEUTROPHILS # BLD AUTO: 3.55 X10(3)/MCL (ref 2.1–9.2)
NEUTROPHILS NFR BLD AUTO: 64.1 %
NRBC BLD AUTO-RTO: 0 %
PLATELET # BLD AUTO: 185 X10(3)/MCL (ref 130–400)
PMV BLD AUTO: 8.7 FL (ref 7.4–10.4)
POTASSIUM SERPL-SCNC: 4 MMOL/L (ref 3.5–5.1)
RBC # BLD AUTO: 2.33 X10(6)/MCL (ref 4.7–6.1)
SODIUM SERPL-SCNC: 140 MMOL/L (ref 136–145)
WBC # SPEC AUTO: 5.54 X10(3)/MCL (ref 4.5–11.5)

## 2023-06-23 PROCEDURE — 85025 COMPLETE CBC W/AUTO DIFF WBC: CPT | Performed by: SPECIALIST

## 2023-06-23 PROCEDURE — 80048 BASIC METABOLIC PNL TOTAL CA: CPT | Performed by: SPECIALIST

## 2023-06-28 ENCOUNTER — ANESTHESIA EVENT (OUTPATIENT)
Dept: SURGERY | Facility: HOSPITAL | Age: 78
End: 2023-06-28
Payer: MEDICARE

## 2023-06-28 PROCEDURE — 82270 OCCULT BLOOD FECES: CPT | Performed by: INTERNAL MEDICINE

## 2023-06-29 ENCOUNTER — LAB REQUISITION (OUTPATIENT)
Dept: LAB | Facility: HOSPITAL | Age: 78
End: 2023-06-29
Payer: MEDICARE

## 2023-06-29 DIAGNOSIS — D64.9 ANEMIA, UNSPECIFIED: ICD-10-CM

## 2023-06-29 LAB — HEMOCCULT SP1 STL QL: POSITIVE

## 2023-07-03 ENCOUNTER — ANESTHESIA (OUTPATIENT)
Dept: SURGERY | Facility: HOSPITAL | Age: 78
End: 2023-07-03
Payer: MEDICARE

## 2023-07-03 ENCOUNTER — HOSPITAL ENCOUNTER (OUTPATIENT)
Facility: HOSPITAL | Age: 78
Discharge: HOME | End: 2023-07-03
Attending: SPECIALIST | Admitting: SPECIALIST
Payer: MEDICARE

## 2023-07-03 VITALS
TEMPERATURE: 98 F | BODY MASS INDEX: 23.66 KG/M2 | HEART RATE: 70 BPM | WEIGHT: 178.56 LBS | HEIGHT: 73 IN | RESPIRATION RATE: 16 BRPM | SYSTOLIC BLOOD PRESSURE: 132 MMHG | OXYGEN SATURATION: 98 % | DIASTOLIC BLOOD PRESSURE: 77 MMHG

## 2023-07-03 DIAGNOSIS — N18.6 END STAGE RENAL DISEASE: ICD-10-CM

## 2023-07-03 DIAGNOSIS — Z01.818 PRE-OP EVALUATION: ICD-10-CM

## 2023-07-03 DIAGNOSIS — N18.6 ESRD (END STAGE RENAL DISEASE): ICD-10-CM

## 2023-07-03 LAB
ANION GAP SERPL CALC-SCNC: 8 MEQ/L
BASOPHILS # BLD AUTO: 0.02 X10(3)/MCL
BASOPHILS NFR BLD AUTO: 0.3 %
BUN SERPL-MCNC: 30.9 MG/DL (ref 8.4–25.7)
CALCIUM SERPL-MCNC: 9.4 MG/DL (ref 8.8–10)
CHLORIDE SERPL-SCNC: 105 MMOL/L (ref 98–107)
CO2 SERPL-SCNC: 28 MMOL/L (ref 23–31)
CREAT SERPL-MCNC: 5.77 MG/DL (ref 0.73–1.18)
CREAT/UREA NIT SERPL: 5
EOSINOPHIL # BLD AUTO: 0.21 X10(3)/MCL (ref 0–0.9)
EOSINOPHIL NFR BLD AUTO: 3.7 %
ERYTHROCYTE [DISTWIDTH] IN BLOOD BY AUTOMATED COUNT: 16.2 % (ref 11.5–17)
GFR SERPLBLD CREATININE-BSD FMLA CKD-EPI: 9 MLS/MIN/1.73/M2
GLUCOSE SERPL-MCNC: 92 MG/DL (ref 82–115)
HCT VFR BLD AUTO: 28.2 % (ref 42–52)
HGB BLD-MCNC: 8.9 G/DL (ref 14–18)
IMM GRANULOCYTES # BLD AUTO: 0.02 X10(3)/MCL (ref 0–0.04)
IMM GRANULOCYTES NFR BLD AUTO: 0.3 %
LYMPHOCYTES # BLD AUTO: 0.96 X10(3)/MCL (ref 0.6–4.6)
LYMPHOCYTES NFR BLD AUTO: 16.8 %
MCH RBC QN AUTO: 34.6 PG (ref 27–31)
MCHC RBC AUTO-ENTMCNC: 31.6 G/DL (ref 33–36)
MCV RBC AUTO: 109.7 FL (ref 80–94)
MONOCYTES # BLD AUTO: 0.45 X10(3)/MCL (ref 0.1–1.3)
MONOCYTES NFR BLD AUTO: 7.9 %
NEUTROPHILS # BLD AUTO: 4.07 X10(3)/MCL (ref 2.1–9.2)
NEUTROPHILS NFR BLD AUTO: 71 %
NRBC BLD AUTO-RTO: 0 %
PLATELET # BLD AUTO: 169 X10(3)/MCL (ref 130–400)
PMV BLD AUTO: 8.3 FL (ref 7.4–10.4)
POTASSIUM SERPL-SCNC: 4.5 MMOL/L (ref 3.5–5.1)
RBC # BLD AUTO: 2.57 X10(6)/MCL (ref 4.7–6.1)
SODIUM SERPL-SCNC: 141 MMOL/L (ref 136–145)
WBC # SPEC AUTO: 5.73 X10(3)/MCL (ref 4.5–11.5)

## 2023-07-03 PROCEDURE — 37000008 HC ANESTHESIA 1ST 15 MINUTES: Performed by: SPECIALIST

## 2023-07-03 PROCEDURE — D9220A PRA ANESTHESIA: Mod: ANES,,, | Performed by: ANESTHESIOLOGY

## 2023-07-03 PROCEDURE — 63600175 PHARM REV CODE 636 W HCPCS: Performed by: SPECIALIST

## 2023-07-03 PROCEDURE — 36821 AV FUSION DIRECT ANY SITE: CPT | Mod: 79,LT,, | Performed by: SPECIALIST

## 2023-07-03 PROCEDURE — 36000706: Performed by: SPECIALIST

## 2023-07-03 PROCEDURE — D9220A PRA ANESTHESIA: ICD-10-PCS | Mod: CRNA,,, | Performed by: NURSE ANESTHETIST, CERTIFIED REGISTERED

## 2023-07-03 PROCEDURE — 71000033 HC RECOVERY, INTIAL HOUR: Performed by: SPECIALIST

## 2023-07-03 PROCEDURE — 64415: ICD-10-PCS | Mod: 59,LT,, | Performed by: ANESTHESIOLOGY

## 2023-07-03 PROCEDURE — 63600175 PHARM REV CODE 636 W HCPCS: Performed by: ANESTHESIOLOGY

## 2023-07-03 PROCEDURE — 25000003 PHARM REV CODE 250: Performed by: NURSE ANESTHETIST, CERTIFIED REGISTERED

## 2023-07-03 PROCEDURE — D9220A PRA ANESTHESIA: ICD-10-PCS | Mod: ANES,,, | Performed by: ANESTHESIOLOGY

## 2023-07-03 PROCEDURE — D9220A PRA ANESTHESIA: Mod: CRNA,,, | Performed by: NURSE ANESTHETIST, CERTIFIED REGISTERED

## 2023-07-03 PROCEDURE — 37000009 HC ANESTHESIA EA ADD 15 MINS: Performed by: SPECIALIST

## 2023-07-03 PROCEDURE — 85025 COMPLETE CBC W/AUTO DIFF WBC: CPT | Performed by: SPECIALIST

## 2023-07-03 PROCEDURE — 36821 PR ANASTOMOSIS,AV,ANY SITE: ICD-10-PCS | Mod: 79,LT,, | Performed by: SPECIALIST

## 2023-07-03 PROCEDURE — 36000707: Performed by: SPECIALIST

## 2023-07-03 PROCEDURE — 71000015 HC POSTOP RECOV 1ST HR: Performed by: SPECIALIST

## 2023-07-03 PROCEDURE — 71000016 HC POSTOP RECOV ADDL HR: Performed by: SPECIALIST

## 2023-07-03 PROCEDURE — 64415 NJX AA&/STRD BRCH PLXS IMG: CPT | Performed by: ANESTHESIOLOGY

## 2023-07-03 PROCEDURE — 80048 BASIC METABOLIC PNL TOTAL CA: CPT | Performed by: SPECIALIST

## 2023-07-03 PROCEDURE — 63600175 PHARM REV CODE 636 W HCPCS: Performed by: NURSE ANESTHETIST, CERTIFIED REGISTERED

## 2023-07-03 RX ORDER — ACETAMINOPHEN 10 MG/ML
1000 INJECTION, SOLUTION INTRAVENOUS ONCE
Status: CANCELLED | OUTPATIENT
Start: 2023-07-03 | End: 2023-07-03

## 2023-07-03 RX ORDER — SODIUM CHLORIDE, SODIUM LACTATE, POTASSIUM CHLORIDE, CALCIUM CHLORIDE 600; 310; 30; 20 MG/100ML; MG/100ML; MG/100ML; MG/100ML
INJECTION, SOLUTION INTRAVENOUS CONTINUOUS
Status: DISCONTINUED | OUTPATIENT
Start: 2023-07-03 | End: 2023-07-03 | Stop reason: HOSPADM

## 2023-07-03 RX ORDER — ASPIRIN 325 MG
325 TABLET, DELAYED RELEASE (ENTERIC COATED) ORAL ONCE
Status: DISCONTINUED | OUTPATIENT
Start: 2023-07-03 | End: 2023-07-03 | Stop reason: HOSPADM

## 2023-07-03 RX ORDER — ONDANSETRON 4 MG/1
4 TABLET, ORALLY DISINTEGRATING ORAL EVERY 6 HOURS PRN
Status: DISCONTINUED | OUTPATIENT
Start: 2023-07-03 | End: 2023-07-03 | Stop reason: HOSPADM

## 2023-07-03 RX ORDER — ONDANSETRON 2 MG/ML
INJECTION INTRAMUSCULAR; INTRAVENOUS
Status: DISCONTINUED | OUTPATIENT
Start: 2023-07-03 | End: 2023-11-06

## 2023-07-03 RX ORDER — MEPERIDINE HYDROCHLORIDE 25 MG/ML
12.5 INJECTION INTRAMUSCULAR; INTRAVENOUS; SUBCUTANEOUS ONCE AS NEEDED
Status: CANCELLED | OUTPATIENT
Start: 2023-07-03 | End: 2023-07-04

## 2023-07-03 RX ORDER — CEFAZOLIN SODIUM 1 G/3ML
INJECTION, POWDER, FOR SOLUTION INTRAMUSCULAR; INTRAVENOUS
Status: DISCONTINUED | OUTPATIENT
Start: 2023-07-03 | End: 2023-07-03 | Stop reason: HOSPADM

## 2023-07-03 RX ORDER — PROTAMINE SULFATE 10 MG/ML
INJECTION, SOLUTION INTRAVENOUS
Status: DISCONTINUED | OUTPATIENT
Start: 2023-07-03 | End: 2023-11-06

## 2023-07-03 RX ORDER — PROPOFOL 10 MG/ML
INJECTION, EMULSION INTRAVENOUS CONTINUOUS PRN
Status: DISCONTINUED | OUTPATIENT
Start: 2023-07-03 | End: 2023-11-06

## 2023-07-03 RX ORDER — MIDAZOLAM HYDROCHLORIDE 1 MG/ML
2 INJECTION INTRAMUSCULAR; INTRAVENOUS ONCE AS NEEDED
Status: COMPLETED | OUTPATIENT
Start: 2023-07-03 | End: 2023-07-03

## 2023-07-03 RX ORDER — ASPIRIN 325 MG
325 TABLET, DELAYED RELEASE (ENTERIC COATED) ORAL DAILY
Status: DISCONTINUED | OUTPATIENT
Start: 2023-07-03 | End: 2023-07-03

## 2023-07-03 RX ORDER — SODIUM CITRATE AND CITRIC ACID MONOHYDRATE 334; 500 MG/5ML; MG/5ML
30 SOLUTION ORAL ONCE
Status: DISCONTINUED | OUTPATIENT
Start: 2023-07-03 | End: 2023-07-03 | Stop reason: HOSPADM

## 2023-07-03 RX ORDER — ROPIVACAINE HYDROCHLORIDE 5 MG/ML
INJECTION, SOLUTION EPIDURAL; INFILTRATION; PERINEURAL
Status: COMPLETED | OUTPATIENT
Start: 2023-07-03 | End: 2023-07-03

## 2023-07-03 RX ORDER — HYDROMORPHONE HYDROCHLORIDE 2 MG/ML
0.2 INJECTION, SOLUTION INTRAMUSCULAR; INTRAVENOUS; SUBCUTANEOUS EVERY 5 MIN PRN
Status: CANCELLED | OUTPATIENT
Start: 2023-07-03

## 2023-07-03 RX ORDER — ROPIVACAINE HYDROCHLORIDE 5 MG/ML
INJECTION, SOLUTION EPIDURAL; INFILTRATION; PERINEURAL
Status: COMPLETED
Start: 2023-07-03 | End: 2023-07-03

## 2023-07-03 RX ORDER — HEPARIN SODIUM 5000 [USP'U]/ML
INJECTION, SOLUTION INTRAVENOUS; SUBCUTANEOUS
Status: DISCONTINUED | OUTPATIENT
Start: 2023-07-03 | End: 2023-07-03 | Stop reason: HOSPADM

## 2023-07-03 RX ORDER — SODIUM CHLORIDE 9 MG/ML
INJECTION, SOLUTION INTRAVENOUS CONTINUOUS
Status: DISCONTINUED | OUTPATIENT
Start: 2023-07-03 | End: 2023-07-03 | Stop reason: HOSPADM

## 2023-07-03 RX ORDER — HYDROCODONE BITARTRATE AND ACETAMINOPHEN 5; 325 MG/1; MG/1
1 TABLET ORAL EVERY 4 HOURS PRN
Status: DISCONTINUED | OUTPATIENT
Start: 2023-07-03 | End: 2023-07-03 | Stop reason: HOSPADM

## 2023-07-03 RX ORDER — LIDOCAINE HYDROCHLORIDE 20 MG/ML
INJECTION INTRAVENOUS
Status: DISCONTINUED | OUTPATIENT
Start: 2023-07-03 | End: 2023-11-06

## 2023-07-03 RX ORDER — LIDOCAINE HYDROCHLORIDE 10 MG/ML
1 INJECTION, SOLUTION EPIDURAL; INFILTRATION; INTRACAUDAL; PERINEURAL ONCE
Status: DISCONTINUED | OUTPATIENT
Start: 2023-07-03 | End: 2023-07-03 | Stop reason: HOSPADM

## 2023-07-03 RX ORDER — HEPARIN SODIUM 1000 [USP'U]/ML
INJECTION, SOLUTION INTRAVENOUS; SUBCUTANEOUS
Status: DISCONTINUED | OUTPATIENT
Start: 2023-07-03 | End: 2023-11-06

## 2023-07-03 RX ORDER — MIDAZOLAM HYDROCHLORIDE 1 MG/ML
INJECTION INTRAMUSCULAR; INTRAVENOUS
Status: DISCONTINUED
Start: 2023-07-03 | End: 2023-07-03 | Stop reason: HOSPADM

## 2023-07-03 RX ORDER — GLYCOPYRROLATE 0.2 MG/ML
INJECTION INTRAMUSCULAR; INTRAVENOUS
Status: DISCONTINUED | OUTPATIENT
Start: 2023-07-03 | End: 2023-11-06

## 2023-07-03 RX ORDER — CEFAZOLIN SODIUM 2 G/50ML
2 SOLUTION INTRAVENOUS
Status: COMPLETED | OUTPATIENT
Start: 2023-07-03 | End: 2023-07-03

## 2023-07-03 RX ADMIN — MIDAZOLAM HYDROCHLORIDE 1 MG: 1 INJECTION, SOLUTION INTRAMUSCULAR; INTRAVENOUS at 10:07

## 2023-07-03 RX ADMIN — PROPOFOL 50 MCG/KG/MIN: 10 INJECTION, EMULSION INTRAVENOUS at 11:07

## 2023-07-03 RX ADMIN — GLYCOPYRROLATE 0.2 MG: 0.2 INJECTION INTRAMUSCULAR; INTRAVENOUS at 11:07

## 2023-07-03 RX ADMIN — ONDANSETRON 4 MG: 2 INJECTION INTRAMUSCULAR; INTRAVENOUS at 11:07

## 2023-07-03 RX ADMIN — HEPARIN SODIUM 4000 UNITS: 1000 INJECTION, SOLUTION INTRAVENOUS; SUBCUTANEOUS at 11:07

## 2023-07-03 RX ADMIN — ROPIVACAINE HYDROCHLORIDE 30 ML: 5 INJECTION, SOLUTION EPIDURAL; INFILTRATION; PERINEURAL at 10:07

## 2023-07-03 RX ADMIN — SODIUM CHLORIDE: 9 INJECTION, SOLUTION INTRAVENOUS at 11:07

## 2023-07-03 RX ADMIN — PROTAMINE SULFATE 25 MG: 10 INJECTION, SOLUTION INTRAVENOUS at 11:07

## 2023-07-03 RX ADMIN — LIDOCAINE HYDROCHLORIDE 80 MG: 20 INJECTION INTRAVENOUS at 11:07

## 2023-07-03 RX ADMIN — CEFAZOLIN SODIUM 2 G: 2 SOLUTION INTRAVENOUS at 11:07

## 2023-07-03 NOTE — ANESTHESIA PROCEDURE NOTES
Supra clav blk    Patient location during procedure: pre-op   Block not for primary anesthetic.  Reason for block: at surgeon's request and post-op pain management   Post-op Pain Location: L shoulder pain   Start time: 7/3/2023 10:54 AM  Timeout: 7/3/2023 10:54 AM   End time: 7/3/2023 11:00 AM    Staffing  Authorizing Provider: Paul Sawyer MD  Performing Provider: Paul Sawyer MD    Preanesthetic Checklist  Completed: patient identified, IV checked, site marked, risks and benefits discussed, surgical consent, monitors and equipment checked, pre-op evaluation and timeout performed  Peripheral Block  Patient position: supine  Prep: ChloraPrep and Mask worn, hand hygeine performed, sterile gloves worn  Patient monitoring: continuous pulse ox, frequent blood pressure checks and cardiac monitor (RN monitoring vitals throughout procedure.)  Block type: supraclavicular  Laterality: left  Injection technique: single shot  Needle  Needle type: Stimuplex   Needle gauge: 22 G  Needle length: 2 in  Needle localization: anatomical landmarks and ultrasound guidance  Needle insertion depth: 4 cm   -ultrasound image captured on disc.  Assessment  Injection assessment: negative aspiration, negative parasthesia and local visualized surrounding nerve  Paresthesia pain: none  Heart rate change: no  Slow fractionated injection: yes  Pain Tolerance: comfortable throughout block  Medications:    Medications: ropivacaine (NAROPIN) injection 0.5% - Perineural   30 mL - 7/3/2023 10:58:00 AM    Additional Notes  Block..See EMR for any note re current/previous paresthesia, dysesthesias or chronic pain complaints in limb to be blocked.  US used to observe needle trajectory, needle placed in close proximity to appropriate nerve structures, they appeared anatomically normal.  Deposition of LA in close proximity to nerve structures observed.  Perm image saved.  Stimulation of appropriate muscles noted @ 0.5ma with a + Ramesh test.  No  resistance to injection was encountered.  No paraesthesia or aspiration of blood observed.  No pain with injection.  See anesthesia chart for any drugs used to supplement the block. Sedation was provided at a level for patient to be able to communicate any discomfort.

## 2023-07-03 NOTE — DISCHARGE SUMMARY
[facility] - Periop Services  Discharge Note  Short Stay    Procedure(s) (LRB):  TRANSPOSITION, VEIN, BASILIC (Left)      OUTCOME: Patient tolerated treatment/procedure well without complication and is now ready for discharge.    DISPOSITION: Home or Self Care    FINAL DIAGNOSIS:  End stage renal disease    FOLLOWUP: In clinic    DISCHARGE INSTRUCTIONS:    Discharge Procedure Orders   Diet general     Keep surgical extremity elevated     Wound care routine (specify)   Order Comments: Leave dermabond in place until it falls off;  Ok to wash with soap under running water but do not submerge.  Do not use antibiotics ointment.     Call MD for:  temperature >100.4     Call MD for:  redness, tenderness, or signs of infection (pain, swelling, redness, odor or green/yellow discharge around incision site)     Activity as tolerated         Clinical Reference Documents Added to Patient Instructions         Document    ARTERIOVENOUS FISTULA FOR DIALYSIS DISCHARGE INSTRUCTIONS (ENGLISH)    DIALYSIS DIET  (ENGLISH)    HEMODIALYSIS (ENGLISH)    HOW TO PREVENT SURGICAL SITE INFECTIONS (ENGLISH)            TIME SPENT ON DISCHARGE: 5 minutes

## 2023-07-03 NOTE — INTERVAL H&P NOTE
The patient has been examined and the H&P has been reviewed:    I concur with the findings and changes have been noted since the H&P was written: Episode of hemoccult positive stool.  Hct 28 today.  No blood in stool over the past week.    Surgery risks, benefits and alternative options discussed and understood by patient/family.          There are no hospital problems to display for this patient.

## 2023-07-03 NOTE — ANESTHESIA PREPROCEDURE EVALUATION
"                                                                                                             07/03/2023  Angel Pablo Sr. is a 77 y.o., male with esrd, last dialysis friday, h/o cva, and other medical problems listed in the EMR    Pre-op Assessment    I have reviewed the Patient Summary Reports.     I have reviewed the Nursing Notes. I have reviewed the NPO Status.   I have reviewed the Medications.     Review of Systems      Physical Exam  General: Well nourished and Cooperative    Airway:  Mallampati: II   Mouth Opening: Normal  TM Distance: Normal  Tongue: Normal  Neck ROM: Normal ROM    Dental:  Dentures    Chest/Lungs:  Clear to auscultation    Heart:  Rate: Normal        Anesthesia Plan  Type of Anesthesia, risks & benefits discussed:    Anesthesia Type: Regional  Intra-op Monitoring Plan: Standard ASA Monitors  Post Op Pain Control Plan: multimodal analgesia  Informed Consent: Informed consent signed with the Patient and all parties understand the risks and agree with anesthesia plan.  All questions answered.   ASA Score: 4  Day of Surgery Review of History & Physical: H&P Update referred to the surgeon/provider.    Ready For Surgery From Anesthesia Perspective.     .  I explained anesthesia plan to patient/responsbile party if available.  Anesthesia consent done going over the material facts, risks, complications & alternatives, obtained which includes the possibility of altering the anesthesia plan.  I reviewed problem list, appropriate labs, any workup, Xray, EKG etc noted below.  Patients condition is satisfactory to proceed with anesthesia plan unless otherwise noted (see anesthesia chart for details of the anesthesia plan carried out).      Pre-operative evaluation for Procedure(s) (LRB):  TRANSPOSITION, VEIN, BASILIC (Left)    /73   Pulse 60   Temp 36.5 °C (97.7 °F) (Oral)   Resp 16   Ht 6' 1" (1.854 m)   Wt 82.3 kg (181 lb 8 oz)   SpO2 100%   BMI 23.95 kg/m²     Patient " Active Problem List   Diagnosis    Mechanical breakdown of surgically created arteriovenous fistula       Review of patient's allergies indicates:  No Known Allergies    Current Outpatient Medications   Medication Instructions    acetaminophen (TYLENOL) 650 mg, Oral, Every 4 hours PRN    ascorbic acid (vitamin C) (VITAMIN C) 1,000 mg, Oral, Daily    aspirin (ECOTRIN) 81 mg, Oral, Daily    atorvastatin (LIPITOR) 40 mg, Oral, Nightly    B complex-vitamin C-folic acid (MARIE-JAJA/NEPHRO-JAJA) 0.8 mg Tab 1 tablet, Oral, Daily    HYDROcodone-acetaminophen (NORCO) 7.5-325 mg per tablet 1 tablet, Oral, Every 6 hours PRN    levETIRAcetam (KEPPRA) 500 mg, Oral, 2 times daily    melatonin (MELATIN) 10 mg, Oral, Nightly    memantine (NAMENDA) 10 mg, Oral, 2 times daily    mirabegron (MYRBETRIQ) 50 mg Tb24 1 tablet, Oral, Daily    ondansetron (ZOFRAN-ODT) 4 mg, Oral, Every 6 hours PRN    potassium chloride (KLOR-CON) 10 MEQ TbSR 10 mEq, Oral, Once, Every Monday, Tuesday, Wednesday, Thursday, Friday    tamsulosin (FLOMAX) 0.8 mg, Oral, Nightly    trazodone (DESYREL) 25 mg, Oral, Nightly       Past Surgical History:   Procedure Laterality Date    COLONOSCOPY      EXCISION OF ARTERIOVENOUS FISTULA Left 5/3/2023    Procedure: EXCISION, AV FISTULA;  Surgeon: Natalie Delatorre MD;  Location: [facility];  Service: Peripheral Vascular;  Laterality: Left;  LEFT UPPER ARM FISTULA REVISION WITH TUNNELED CATHETER INSERTION // VASCULAR // SUPINE // SUPRACLAVICULAR BLOCK    FISTULOGRAM N/A 4/20/2023    Procedure: Fistulogram;  Surgeon: Akil Escobar DO;  Location: [facility] CATH LAB;  Service: Nephrology;  Laterality: N/A;  LUE FISTULOGRAM    INSERTION, VASCULAR ACCESS CATHETER N/A 5/3/2023    Procedure: INSERTION, VASCULAR ACCESS CATHETER;  Surgeon: Natalie Deltaorre MD;  Location: [facility] OR;  Service: Peripheral Vascular;  Laterality: N/A;    KNEE SURGERY Left        Social History     Socioeconomic History    Marital status:  Single   Tobacco Use    Smoking status: Never    Smokeless tobacco: Never   Substance and Sexual Activity    Alcohol use: Not Currently    Drug use: Never    Sexual activity: Not Currently   Social History Narrative    ** Merged History Encounter **            Lab Results   Component Value Date    WBC 5.73 07/03/2023    HGB 8.9 (L) 07/03/2023    HCT 28.2 (L) 07/03/2023    .7 (H) 07/03/2023     07/03/2023          BMP  Lab Results   Component Value Date    HCT 28.2 (L) 07/03/2023     06/23/2023    K 4.0 06/23/2023    BUN 35.5 (H) 06/23/2023    CREATININE 6.18 (H) 06/23/2023    CALCIUM 9.1 06/23/2023        INR  No results for input(s): PT, INR, PROTIME, APTT in the last 72 hours.        Diagnostic Studies:      EKG:  No results found for this or any previous visit.

## 2023-07-03 NOTE — OP NOTE
Surgeon: Natalie Delatorre MD    Date: 07/03/2023     Diagnosis: Pre-Op Diagnosis Codes:     * ESRD (end stage renal disease) [N18.6]     Procedure: Left radiocephalic fistula creation      History of Presenting Illness: Mr. Pablo is a 77 y.o. year old male who has end stage renal disease and needs long term hemodialysis access.      Procedure:  The patient was taken to the operating room and placed in a supine position.  The Left arm was prepped and draped in the usual sterile fashion.  Antibiotics were administered and appropriate time-out was performed.  B-mode ultrasound was performed on the extremity.  An incision was made between the cephalic vein and the radial artery near the wrist.  Dissection was performed to identify the cephalic vein.  Side branches were ligated and the vein was mobilized.  It easily accepted a 4 mm dilator demonstrated a thrill with heparin saline injection.  The radial artery was identified and controlled.  Heparinization was performed and the vessel was clamped.  An end-to-side anastomosis was performed with running 6 0 Prolene suture.  Flow was restored to the system and there was a good thrill to the fistula.  Further dissection was performed to avoid tension on the fistula.  Hemostasis was obtained.  The wound was closed with 3-0 Vicryl suture and 4-0 Monocryl suture.  Dermabond was applied.    Complications:  none    Findings: suitable vein - artery compressible but some calcific disease;  thrill present and palpable radial pulse at completion.    EBL:  less than 20ml

## 2023-07-06 ENCOUNTER — TELEPHONE (OUTPATIENT)
Dept: VASCULAR SURGERY | Facility: CLINIC | Age: 78
End: 2023-07-06
Payer: MEDICARE

## 2023-07-06 NOTE — TELEPHONE ENCOUNTER
Post-Operative Status:  Angel Pablo Sr. is s/p vascular access placement on 7/3/23.   Denies signs of symptoms of infection. Site healing well.  Denies any numbness or pain to the left hand.   Follow up appointment date/time were provided.   Advised to call with any questions or concerns including changes to incisional site and/or hand pain. Verbalizes understanding of plan.

## 2023-07-09 PROCEDURE — 82270 OCCULT BLOOD FECES: CPT | Performed by: INTERNAL MEDICINE

## 2023-07-10 ENCOUNTER — LAB REQUISITION (OUTPATIENT)
Dept: LAB | Facility: HOSPITAL | Age: 78
End: 2023-07-10
Payer: MEDICARE

## 2023-07-10 DIAGNOSIS — D64.9 ANEMIA, UNSPECIFIED: ICD-10-CM

## 2023-07-10 LAB — HEMOCCULT SP1 STL QL: NEGATIVE

## 2023-07-30 PROCEDURE — 82270 OCCULT BLOOD FECES: CPT | Performed by: INTERNAL MEDICINE

## 2023-07-31 ENCOUNTER — LAB REQUISITION (OUTPATIENT)
Dept: LAB | Facility: HOSPITAL | Age: 78
End: 2023-07-31
Payer: MEDICARE

## 2023-07-31 DIAGNOSIS — D64.9 ANEMIA, UNSPECIFIED: ICD-10-CM

## 2023-07-31 LAB — HEMOCCULT SP1 STL QL: NEGATIVE

## 2023-08-14 NOTE — PROGRESS NOTES
[facility] Vascular - Clinic Note  Natalie Delatorre MD      Patient Name: Angel Pablo Sr.                   : 1945     MRN: [id number]   Visit Date: 8/15/2023          History Present Illness     Reason for Visit: Hemodialysis Access       Mr. Pablo presents to the clinic for 6 week follow up s/p left radiocephalic fistula 7/3/23. He denies fevers, drainage from his incision, or continued swelling/pain. He denies numbness or fatigue to his left hand. He reports intermittent chronic numbness to his right hand. He denies numbness to his left hand.     He is currently using a right chest wall tunneled catheter for dialysis without functional issues.       REVIEW OF SYSTEMS:  ROS  12 point review of systems conducted, negative except as stated in the history of present illness. See HPI for details.        Physical Exam      Visit Vitals  /73 (BP Location: Right arm)   Pulse 81       General: well-nourished, no acute distress, and healthy appearing, in a wheelchair, alert, pleasant, and conversant  Neurologic: cranial nerves are grossly intact, no neurologic deficits  HEENT: grossly normal and no scleral icterus  Neck/Chest: normal  and soft without lymphadenopathy  Respiratory: breathing easily and without respiratory distress  Abdomen: normal and soft  Cardiology: regular rate and rhythm    Upper Extremity Arterial Exam:   Left - radial is palpable    Dialysis Access:  left radiocephalic fistula and right chest wall tunneled catheter  Assessment: good thrill with moderate pulsatility, mild tortuosity    Musculoskeletal:   Upper Extremity: normal bilateral hand function, hands are warm bilaterally  Lower Extremity: no edema present to bilateral lower extremities      Post Operative Incision:   Location: left forearm  clean, dry, no drainage, and healed appropriately            Assessment and Plan     Mr. Pablo is a 77 y.o. with with end stage renal disease (ESRD) on dialysis who is s/p left  radiocephalic fistula creation on 7/3/23. On exam his fistula has a good thrill with significant pulsatility and mid/distal fistula thrill. I appreciate some areas of narrowing on assessment.  The access would benefit from fistulogram with likely angioplasty.  We discussed the risks and benefits of the procedure including bleeding, infection, need for further procedures, injury to access, embolism of coils (if used), and/or limb ischemia. He wishes to proceed.      1. ESRD (end stage renal disease)    2. Mechanical complication of arteriovenous fistula surgically created, initial encounter           Imaging Obtained/Reviewed   Study: none  Date:              Medical History     Past Medical History:   Diagnosis Date    Benign prostatic hyperplasia with lower urinary tract symptoms     Dementia     Dialysis patient     M/W/F    End stage renal disease     H/O prostate cancer     Hemiplegia and hemiparesis following cerebral infarction affecting left dominant side     Hyperlipidemia     Hypertension     Hypokalemia     Insomnia     Insomnia     Mechanical breakdown of surgically created arteriovenous fistula, subsequent encounter     Muscle wasting and atrophy, not elsewhere classified, multiple sites     Other lack of coordination     Pain in left knee     Paralytic gait     Presbyopia     Presence of intraocular lens     Repeated falls     Seizures     Stroke      Past Surgical History:   Procedure Laterality Date    COLONOSCOPY      EXCISION OF ARTERIOVENOUS FISTULA Left 5/3/2023    Procedure: EXCISION, AV FISTULA;  Surgeon: Natalie Delatorre MD;  Location: [facility] OR;  Service: Peripheral Vascular;  Laterality: Left;  LEFT UPPER ARM FISTULA REVISION WITH TUNNELED CATHETER INSERTION // VASCULAR // SUPINE // SUPRACLAVICULAR BLOCK    FISTULOGRAM N/A 4/20/2023    Procedure: Fistulogram;  Surgeon: Akil Escobar DO;  Location: [facility] CATH LAB;  Service: Nephrology;  Laterality: N/A;  LUE FISTULOGRAM    INSERTION, VASCULAR  ACCESS CATHETER N/A 5/3/2023    Procedure: INSERTION, VASCULAR ACCESS CATHETER;  Surgeon: Natalie Delatorre MD;  Location: [facility] OR;  Service: Peripheral Vascular;  Laterality: N/A;    KNEE SURGERY Left     TRANSPOSITION OF BASILIC VEIN Left 7/3/2023    Procedure: TRANSPOSITION, VEIN, BASILIC;  Surgeon: Natalie Delatorre MD;  Location: [facility] OR;  Service: Peripheral Vascular;  Laterality: Left;  left radiocephalic vs basilic fistula creation, supraclavicular block // XX     History reviewed. No pertinent family history.  Social History     Socioeconomic History    Marital status: Single   Tobacco Use    Smoking status: Never    Smokeless tobacco: Never   Substance and Sexual Activity    Alcohol use: Not Currently    Drug use: Never    Sexual activity: Not Currently   Social History Narrative    ** Merged History Encounter **          Current Outpatient Medications   Medication Instructions    acetaminophen (TYLENOL) 650 mg, Oral, Every 4 hours PRN    ascorbic acid (vitamin C) (VITAMIN C) 1,000 mg, Oral, Daily    aspirin (ECOTRIN) 81 mg, Oral, Daily    atorvastatin (LIPITOR) 40 mg, Oral, Nightly    B complex-vitamin C-folic acid (MARIE-JAJA/NEPHRO-JAJA) 0.8 mg Tab 1 tablet, Oral, Daily    HYDROcodone-acetaminophen (NORCO) 7.5-325 mg per tablet 1 tablet, Oral, Every 6 hours PRN    levETIRAcetam (KEPPRA) 500 mg, Oral, 2 times daily    melatonin (MELATIN) 10 mg, Oral, Nightly    memantine (NAMENDA) 10 mg, Oral, 2 times daily    mirabegron (MYRBETRIQ) 50 mg Tb24 1 tablet, Oral, Daily    ondansetron (ZOFRAN-ODT) 4 mg, Oral, Every 6 hours PRN    potassium chloride (KLOR-CON) 10 MEQ TbSR 10 mEq, Oral, Once, Every Monday, Tuesday, Wednesday, Thursday, Friday    tamsulosin (FLOMAX) 0.8 mg, Oral, Nightly    trazodone (DESYREL) 25 mg, Oral, Nightly     Review of patient's allergies indicates:  No Known Allergies    Patient Care Team:  Prieto Vale MD as PCP - General (Internal Medicine)  [facility] (Dialysis  Center)  [facility] as Dialysis Nurse (Dialysis Center)  [facility] (Skilled Nursing)      No follow-ups on file. In addition to their scheduled follow up, the patient has also been instructed to follow up on as needed basis.     No future appointments.

## 2023-08-15 ENCOUNTER — OFFICE VISIT (OUTPATIENT)
Dept: VASCULAR SURGERY | Facility: CLINIC | Age: 78
End: 2023-08-15
Payer: MEDICARE

## 2023-08-15 VITALS — HEART RATE: 81 BPM | SYSTOLIC BLOOD PRESSURE: 105 MMHG | DIASTOLIC BLOOD PRESSURE: 73 MMHG

## 2023-08-15 DIAGNOSIS — T82.590A MECHANICAL COMPLICATION OF ARTERIOVENOUS FISTULA SURGICALLY CREATED, INITIAL ENCOUNTER: ICD-10-CM

## 2023-08-15 DIAGNOSIS — N18.6 ESRD (END STAGE RENAL DISEASE): Primary | ICD-10-CM

## 2023-08-15 PROCEDURE — 3288F PR FALLS RISK ASSESSMENT DOCUMENTED: ICD-10-PCS | Mod: CPTII,,, | Performed by: SPECIALIST

## 2023-08-15 PROCEDURE — 1160F RVW MEDS BY RX/DR IN RCRD: CPT | Mod: CPTII,,, | Performed by: SPECIALIST

## 2023-08-15 PROCEDURE — 1159F PR MEDICATION LIST DOCUMENTED IN MEDICAL RECORD: ICD-10-PCS | Mod: CPTII,,, | Performed by: SPECIALIST

## 2023-08-15 PROCEDURE — 1101F PT FALLS ASSESS-DOCD LE1/YR: CPT | Mod: CPTII,,, | Performed by: SPECIALIST

## 2023-08-15 PROCEDURE — 3078F DIAST BP <80 MM HG: CPT | Mod: CPTII,,, | Performed by: SPECIALIST

## 2023-08-15 PROCEDURE — 3288F FALL RISK ASSESSMENT DOCD: CPT | Mod: CPTII,,, | Performed by: SPECIALIST

## 2023-08-15 PROCEDURE — 1159F MED LIST DOCD IN RCRD: CPT | Mod: CPTII,,, | Performed by: SPECIALIST

## 2023-08-15 PROCEDURE — 3074F SYST BP LT 130 MM HG: CPT | Mod: CPTII,,, | Performed by: SPECIALIST

## 2023-08-15 PROCEDURE — 99024 POSTOP FOLLOW-UP VISIT: CPT | Mod: ,,, | Performed by: SPECIALIST

## 2023-08-15 PROCEDURE — 99024 PR POST-OP FOLLOW-UP VISIT: ICD-10-PCS | Mod: ,,, | Performed by: SPECIALIST

## 2023-08-15 PROCEDURE — 3074F PR MOST RECENT SYSTOLIC BLOOD PRESSURE < 130 MM HG: ICD-10-PCS | Mod: CPTII,,, | Performed by: SPECIALIST

## 2023-08-15 PROCEDURE — 1101F PR PT FALLS ASSESS DOC 0-1 FALLS W/OUT INJ PAST YR: ICD-10-PCS | Mod: CPTII,,, | Performed by: SPECIALIST

## 2023-08-15 PROCEDURE — 3078F PR MOST RECENT DIASTOLIC BLOOD PRESSURE < 80 MM HG: ICD-10-PCS | Mod: CPTII,,, | Performed by: SPECIALIST

## 2023-08-15 PROCEDURE — 1160F PR REVIEW ALL MEDS BY PRESCRIBER/CLIN PHARMACIST DOCUMENTED: ICD-10-PCS | Mod: CPTII,,, | Performed by: SPECIALIST

## 2023-08-22 ENCOUNTER — HOSPITAL ENCOUNTER (OUTPATIENT)
Facility: HOSPITAL | Age: 78
Discharge: HOME | End: 2023-08-22
Attending: STUDENT IN AN ORGANIZED HEALTH CARE EDUCATION/TRAINING PROGRAM | Admitting: STUDENT IN AN ORGANIZED HEALTH CARE EDUCATION/TRAINING PROGRAM
Payer: MEDICARE

## 2023-08-22 VITALS
TEMPERATURE: 98 F | HEART RATE: 65 BPM | DIASTOLIC BLOOD PRESSURE: 68 MMHG | WEIGHT: 192.69 LBS | SYSTOLIC BLOOD PRESSURE: 127 MMHG | BODY MASS INDEX: 23.96 KG/M2 | OXYGEN SATURATION: 99 % | HEIGHT: 75 IN | RESPIRATION RATE: 18 BRPM

## 2023-08-22 DIAGNOSIS — T82.590A MECHANICAL COMPLICATION OF ARTERIOVENOUS FISTULA SURGICALLY CREATED, INITIAL ENCOUNTER: ICD-10-CM

## 2023-08-22 PROCEDURE — 25500020 PHARM REV CODE 255: Performed by: STUDENT IN AN ORGANIZED HEALTH CARE EDUCATION/TRAINING PROGRAM

## 2023-08-22 PROCEDURE — 99213 OFFICE O/P EST LOW 20 MIN: CPT | Mod: 25,,, | Performed by: STUDENT IN AN ORGANIZED HEALTH CARE EDUCATION/TRAINING PROGRAM

## 2023-08-22 PROCEDURE — 36902 INTRO CATH DIALYSIS CIRCUIT: CPT | Mod: LT,,, | Performed by: STUDENT IN AN ORGANIZED HEALTH CARE EDUCATION/TRAINING PROGRAM

## 2023-08-22 PROCEDURE — 25000003 PHARM REV CODE 250: Performed by: STUDENT IN AN ORGANIZED HEALTH CARE EDUCATION/TRAINING PROGRAM

## 2023-08-22 PROCEDURE — C1769 GUIDE WIRE: HCPCS | Performed by: STUDENT IN AN ORGANIZED HEALTH CARE EDUCATION/TRAINING PROGRAM

## 2023-08-22 PROCEDURE — C1894 INTRO/SHEATH, NON-LASER: HCPCS | Performed by: STUDENT IN AN ORGANIZED HEALTH CARE EDUCATION/TRAINING PROGRAM

## 2023-08-22 PROCEDURE — 99152 MOD SED SAME PHYS/QHP 5/>YRS: CPT | Mod: ,,, | Performed by: STUDENT IN AN ORGANIZED HEALTH CARE EDUCATION/TRAINING PROGRAM

## 2023-08-22 PROCEDURE — 99213 PR OFFICE/OUTPT VISIT, EST, LEVL III, 20-29 MIN: ICD-10-PCS | Mod: 25,,, | Performed by: STUDENT IN AN ORGANIZED HEALTH CARE EDUCATION/TRAINING PROGRAM

## 2023-08-22 PROCEDURE — 99152 MOD SED SAME PHYS/QHP 5/>YRS: CPT | Performed by: STUDENT IN AN ORGANIZED HEALTH CARE EDUCATION/TRAINING PROGRAM

## 2023-08-22 PROCEDURE — C1725 CATH, TRANSLUMIN NON-LASER: HCPCS | Performed by: STUDENT IN AN ORGANIZED HEALTH CARE EDUCATION/TRAINING PROGRAM

## 2023-08-22 PROCEDURE — 36902 PR INTRO CATH, DIALYSIS CIRCUIT W/TRANSLML BALLOON ANGIO: ICD-10-PCS | Mod: LT,,, | Performed by: STUDENT IN AN ORGANIZED HEALTH CARE EDUCATION/TRAINING PROGRAM

## 2023-08-22 PROCEDURE — 36902 INTRO CATH DIALYSIS CIRCUIT: CPT | Mod: LT | Performed by: STUDENT IN AN ORGANIZED HEALTH CARE EDUCATION/TRAINING PROGRAM

## 2023-08-22 PROCEDURE — 63600175 PHARM REV CODE 636 W HCPCS: Performed by: STUDENT IN AN ORGANIZED HEALTH CARE EDUCATION/TRAINING PROGRAM

## 2023-08-22 PROCEDURE — 27201423 OPTIME MED/SURG SUP & DEVICES STERILE SUPPLY: Performed by: STUDENT IN AN ORGANIZED HEALTH CARE EDUCATION/TRAINING PROGRAM

## 2023-08-22 PROCEDURE — 99152 PR MOD CONSCIOUS SEDATION, SAME PHYS, 5+ YRS, FIRST 15 MIN: ICD-10-PCS | Mod: ,,, | Performed by: STUDENT IN AN ORGANIZED HEALTH CARE EDUCATION/TRAINING PROGRAM

## 2023-08-22 RX ORDER — MIDAZOLAM HYDROCHLORIDE 1 MG/ML
INJECTION INTRAMUSCULAR; INTRAVENOUS
Status: DISCONTINUED | OUTPATIENT
Start: 2023-08-22 | End: 2023-08-22 | Stop reason: HOSPADM

## 2023-08-22 RX ORDER — SODIUM CHLORIDE 0.9 % (FLUSH) 0.9 %
10 SYRINGE (ML) INJECTION
Status: ACTIVE | OUTPATIENT
Start: 2023-08-22

## 2023-08-22 RX ORDER — FENTANYL CITRATE 50 UG/ML
INJECTION, SOLUTION INTRAMUSCULAR; INTRAVENOUS
Status: DISCONTINUED | OUTPATIENT
Start: 2023-08-22 | End: 2023-08-22 | Stop reason: HOSPADM

## 2023-08-22 RX ORDER — LIDOCAINE HYDROCHLORIDE 10 MG/ML
INJECTION INFILTRATION; PERINEURAL
Status: DISCONTINUED | OUTPATIENT
Start: 2023-08-22 | End: 2023-08-22 | Stop reason: HOSPADM

## 2023-08-22 NOTE — Clinical Note
The balloon was inflated with indeflator in the  . The balloon max pressure was 12 kevin for 18 seconds

## 2023-08-22 NOTE — Clinical Note
Resolution of brachial vein. Retrograde angiogram reveals improvement in questionable stenosis in the middle fistula.

## 2023-08-22 NOTE — DISCHARGE INSTRUCTIONS
Go to ER if unusual symptoms occur.  See attached instruction sheet from Dr. Escobar's offfice for care instructions.  Do not use fistula until Monday 8/28.   Previously noted non raised pink areas on right forearm near iv site now faded and very faint. Pt denies pain or itching of area at present.

## 2023-08-22 NOTE — Clinical Note
The balloon was inflated with indeflator in the  . The balloon max pressure was 12 kevin for 16 seconds

## 2023-08-22 NOTE — Clinical Note
The balloon was inflated with indeflator in the  . The balloon max pressure was 15 kevin for 15 seconds

## 2023-08-22 NOTE — PROCEDURES
INTERVENTIONAL NEPHROLOGY PROCEDURE NOTE: FISTULOGRAM/GRAFTOGRAM         Patient Name: Angel Pablo Sr. MONT[place] 1945    Procedure Date:    2023    Performing Physician:   Dr. Escobar    Access History: Pt is with ESRD on HD typically via left radiocephalic arteriovenous fistula who presents today with pulsatility.    Pre-Op Diagnosis: T82.590A Mechanical complication of surgically created arteriovenous shunt, initial encounter, N18.6 End Stage Renal Disease (ESRD)  Post-Op Diagnosis: Same    Procedure: Fistulogram with possible angioplasty and stent placement.    Indication: Nonfunctional/Dysfunctional/Malfunctioning HD access.    Informed Consent:  The patient was evaluated in the pre-operative area with assessment including the American Society of Anesthesia risk classification. The procedure is discussed in detail including risks, benefits alternatives and options and the patient agrees to proceed. Informed consent was obtained from the patient.     Maximum sterile barrier technique: The patient was prepped and draped using chlorhexidine prep and maximum sterile barrier technique.    Sedation Note:  Risks and benefits of sedation were reviewed with the patient or surrogate, including bleeding, infection, nausea, vomiting, dizziness, instability, damage to a nerve, damage to a blood vessel, cellulitis, reaction to medications, amnesia, loss of consciousness, respiratory arrest, cardiac arrest.     The patient received the following medications: Versed 1 mg IV and Fentanyl 50 mcg IV; patient did remain alert, responsive, and conversational throughout the procedure. I was personally responsible for supervising the administration of moderate sedation services during the procedure performed and I affirm all the guidelines and requirements described in the CPT 2023 section on moderate sedation were followed, including the use of an independent trained observer who had no other duties during the  procedure. The total face-to-face time was 19 minutes.    Procedure Steps:     The patient was prepped and draped in sterile fashion. Procedure ultrasound revealed patent vascular HD access.    Local anesthesia was administered by injecting 1% lidocaine at the intended site of cannulation. With use of live ultrasonographic visualization, the vascular access was successfully cannulated with a mini-stick needle aimed towards the outflow. After blood flashback was noted, the mini-stick wire was advanced through the needle. Via Seldinger technique, the needle was exchanged for the mini-stick sheath. Angiograms were completed which revealed 2 lesion(s) (summarized below). A 150 cm glide wire was advanced through the mini-stick sheath with the tip parked in the basilic vein. A 6 Fr sheath was exchanged via Seldinger technique for the mini-stick sheath.    The aforementioned lesion(s) are as below, followed by their respective intervention(s) :  #1: Approximately 50% stenosis in the distal body of fistula (dBOF).  Angioplasty was performed at this location using a BD [place] 7 mm x 40 mm balloon. Approximately 100% effacement was obtained at 10-15 GEORGE and was held for 1-5 seconds. Post-angioplasty angiogram revealed 20% residual stenosis. We then upgraded to a  8 mm x 4 cm balloon and inflated it to 12 GEORGE achieving 100% effacement. Post-angioplasty angiogram revealed resolved stenosis.  #2: Approximately 50% stenosis in the left brachial vein.  Angioplasty was performed at this location using a BD [place] 7 mm x 40 mm balloon. Approximately 100% effacement was obtained at 10-15 GEORGE and was held for 1-5 seconds. Post-angioplasty angiogram revealed 40% residual stenosis. We then upgraded to a  8 mm x 4 cm balloon and inflated it to 12 GEORGE achieving 100% effacement. Post-angioplasty angiogram revealed resolved stenosis.  * Retrograde angiogram showed patent inflow segment without notable lesions.    Lesions were  treated in the following order: #2, #1.    Wire and sheath were removed and hemostasis was achieved using a purse-string stitch and light digital pressure at the site of cannulation.    ASSESSMENT/PLAN:  - Successful angioplasty of left brachial vein and distal fistula.    EBL: 5 ml    Contrast: 30 ml    Complications: None    Post-op Instructions: The patient was given both verbal and written post-op instructions. If excessive bleeding at the site, they have been instructed to call their physician or proceed to a local emergency room.    Orders to the dialysis unit: OK to use access for dialysis needs.    Thank you for allowing me the opportunity in taking care of this patient. Please reach me with any questions.    Akil Escobar DO  Interventional Nephrology  Cell: [phone]

## 2023-08-22 NOTE — Clinical Note
The balloon was inflated with indeflator in the  . The balloon max pressure was 15 kevin for 14 seconds

## 2023-08-22 NOTE — H&P
INTERVENTIONAL NEPHROLOGY HISTORY & PHYSICAL       Patient Name: Angel Pablo Sr. MONTANO 1945    Date: 2023  Time: 10:32 AM         HPI: 77 y.o. male with ESRD on HD via left radiocephalic arteriovenous fistula who presents with pulsatility found within his recently created access. The pt was seen by Dr. Delatorre in vascular surgery clinic on 8/15/23 and found to have pulsatility, and was therefore referred to our service. Pt is being prepared for fistulogram with possible intervention today.    [facility] Family is present. Risks and benefits of fistulogram with possible intervention and intravenous conscious sedation was reviewed with the patient. The patient agrees to proceed with the intended procedure. Consents for both intravenous conscious sedation and procedure were signed and placed within the chart.       Review of Systems:  General:  No fatigue  Skin: No rashes  HEENT: No vision changes  CVS: No CP  RS: No SOB  GIT: No abdominal pain  Extremities: No swelling  Neurological:  No focal weakness  Psych: No depression    Past Medical History:   Diagnosis Date    Benign prostatic hyperplasia with lower urinary tract symptoms     Dementia     Dialysis patient     M/W/F    End stage renal disease     H/O prostate cancer     Hemiplegia and hemiparesis following cerebral infarction affecting left dominant side     Hyperlipidemia     Hypertension     Hypokalemia     Insomnia     Insomnia     Mechanical breakdown of surgically created arteriovenous fistula, subsequent encounter     Muscle wasting and atrophy, not elsewhere classified, multiple sites     Other lack of coordination     Pain in left knee     Paralytic gait     Presbyopia     Presence of intraocular lens     Repeated falls     Seizures     Stroke       Past Surgical History:   Procedure Laterality Date    COLONOSCOPY      EXCISION OF ARTERIOVENOUS FISTULA Left 5/3/2023    Procedure: EXCISION, AV  FISTULA;  Surgeon: Natalie Delatorre MD;  Location: [facility] OR;  Service: Peripheral Vascular;  Laterality: Left;  LEFT UPPER ARM FISTULA REVISION WITH TUNNELED CATHETER INSERTION // VASCULAR // SUPINE // SUPRACLAVICULAR BLOCK    FISTULOGRAM N/A 4/20/2023    Procedure: Fistulogram;  Surgeon: Akil Escobar DO;  Location: [facility] CATH LAB;  Service: Nephrology;  Laterality: N/A;  LUE FISTULOGRAM    INSERTION, VASCULAR ACCESS CATHETER N/A 5/3/2023    Procedure: INSERTION, VASCULAR ACCESS CATHETER;  Surgeon: Natalie Delatorre MD;  Location: [facility] OR;  Service: Peripheral Vascular;  Laterality: N/A;    KNEE SURGERY Left     TRANSPOSITION OF BASILIC VEIN Left 7/3/2023    Procedure: TRANSPOSITION, VEIN, BASILIC;  Surgeon: Natalie Delatorre MD;  Location: [facility] OR;  Service: Peripheral Vascular;  Laterality: Left;  left radiocephalic vs basilic fistula creation, supraclavicular block // XX      Review of patient's allergies indicates:  No Known Allergies   Social History     Tobacco Use    Smoking status: Never    Smokeless tobacco: Never   Substance Use Topics    Alcohol use: Not Currently    Drug use: Never      History reviewed. No pertinent family history.      Current Facility-Administered Medications:     sodium chloride 0.9% flush 10 mL, 10 mL, Intravenous, PRN, Akil Escobar DO    Facility-Administered Medications Ordered in Other Encounters:     glycopyrrolate injection, , Intravenous, PRN, Ariel Avalosand O., CRNA, 0.2 mg at 07/03/23 1118    heparin (porcine) injection, , Intravenous, PRN, Prieto Avalos, CRNA, 4,000 Units at 07/03/23 1138    LIDOcaine (cardiac) injection, , Intravenous, PRN, SangaPrieto O., CRNA, 80 mg at 07/03/23 1118    ondansetron injection, , Intravenous, PRN, Prieto Avalos., CRNA, 4 mg at 07/03/23 1135    propofol (DIPRIVAN) 10 mg/mL infusion, , Intravenous, Continuous PRN, Prieto Avalos O., CRNA, Stopped at 07/03/23 1212    protamine injection, , Intravenous, PRN,  Prieto Avalos CRNA, 25 mg at 07/03/23 1159    sodium chloride 0.9% infusion, , Intravenous, Continuous PRN, Prieto Avalos CRNA, Stopped at 07/03/23 1212    Vital Signs:  Temp:  [97.9 °F (36.6 °C)] 97.9 °F (36.6 °C)  Pulse:  [65] 65  Resp:  [20] 20  SpO2:  [96 %] 96 %  BP: (113)/(73) 113/73     Physical Exam:  General: NAD  HEENT: NC/AT, EOMI  CVS: RRR.  RS: breathing easily.  Abdominal: Soft, NT/ND.  Extremities: No edema b/l LE  Skin: No rash, no lesions.  Neurological: No focal deficits.  Psych: Normal affect  Dialysis Access: left radiocephalic arteriovenous fistula with mild-moderate pulsatility throughout the access.    Results:    Lab Results   Component Value Date     07/03/2023    K 4.5 07/03/2023    CO2 28 07/03/2023    BUN 30.9 (H) 07/03/2023    CREATININE 5.77 (H) 07/03/2023     Lab Results   Component Value Date    WBC 5.73 07/03/2023    HGB 8.9 (L) 07/03/2023     07/03/2023    .7 (H) 07/03/2023       Assessment and Plan:      ESRD on HD via left radiocephalic arteriovenous fistula.  Mechanical complication of AVF.  Pt with ESRD on HD via left radiocephalic arteriovenous fistula who presents today with mild-moderate pulsatility throughout the access. The pt is being prepared for fistulogram with possible intervention today.  - Consents obtained and placed within chart.  - Will proceed in cath lab setting today.    Please feel free to reach me with any questions.    Akil Escobar,   Interventional Nephrology  Cell: [phone]

## 2023-08-22 NOTE — Clinical Note
Central venous system patent. Brachial vein stenosis. Distal and middle fistula stenosis. Cephalic vein appears thrombosed.

## 2023-08-22 NOTE — Clinical Note
30 ml of contrast were injected throughout the case. 20 mL of contrast was the total wasted during the case. 50 mL was the total amount used during the case.

## 2023-08-22 NOTE — DISCHARGE SUMMARY
INTERVENTIONAL NEPHROLOGY DISCHARGE SUMMARY         Patient Name: Angel Pablo Sr. MONTANO 1945    Procedure Date: 2023      In brief, Mr. Pablo underwent fistulogram of his L RC AVF 2/2 pulsatility. Angiogram revealed stenosis in the distal fistula and brachial vein. Both were ultimately treated well with an 8 mm balloon.     There was questionable stenosis in the middle fistula, though this did not appear to be significant for intervention. The access does appear to be mildly tortuous otherwise. There is no flow into the cephalic vein from this fistula, where drainage is mostly supplied by the brachial vein.     Pulsatility did decrease, however is still present. I expect the fistula will be ready for cannulation starting next week on Monday. If successful for the following two weeks, he may be referred back for removal of his catheter.    Pre-Op Diagnosis: T82.590A Mechanical complication of surgically created arteriovenous shunt, initial encounter, N18.6 End Stage Renal Disease (ESRD)  Post-Op Diagnosis: Same.    Discharge Instructions/Recommendations:  - Continue use of catheter for HD this week. Start use of fistula next Monday. If successful use for 2 weeks, he may be referred for catheter removal.  - Pt may be discharged after successful monitoring in post-op area.  - Pt may resume home medications.    Thank you for allowing me the opportunity in taking care of this patient. Please reach me with any questions.    Akil Escobar DO  Interventional Nephrology  Cell: [phone]

## 2023-09-01 ENCOUNTER — LAB REQUISITION (OUTPATIENT)
Dept: LAB | Facility: HOSPITAL | Age: 78
End: 2023-09-01
Payer: MEDICARE

## 2023-09-01 DIAGNOSIS — I69.352 HEMIPLEGIA AND HEMIPARESIS FOLLOWING CEREBRAL INFARCTION AFFECTING LEFT DOMINANT SIDE: ICD-10-CM

## 2023-09-01 DIAGNOSIS — N40.1 BENIGN PROSTATIC HYPERPLASIA WITH LOWER URINARY TRACT SYMPTOMS: ICD-10-CM

## 2023-09-01 LAB
ALBUMIN SERPL-MCNC: 3.5 G/DL (ref 3.4–4.8)
ALBUMIN/GLOB SERPL: 0.9 RATIO (ref 1.1–2)
ALP SERPL-CCNC: 76 UNIT/L (ref 40–150)
ALT SERPL-CCNC: 19 UNIT/L (ref 0–55)
AST SERPL-CCNC: 17 UNIT/L (ref 5–34)
BASOPHILS # BLD AUTO: 0.04 X10(3)/MCL
BASOPHILS NFR BLD AUTO: 0.8 %
BILIRUB SERPL-MCNC: 0.3 MG/DL
BUN SERPL-MCNC: 46 MG/DL (ref 8.4–25.7)
CALCIUM SERPL-MCNC: 9.4 MG/DL (ref 8.8–10)
CHLORIDE SERPL-SCNC: 102 MMOL/L (ref 98–107)
CHOLEST SERPL-MCNC: 121 MG/DL
CHOLEST/HDLC SERPL: 3 {RATIO} (ref 0–5)
CO2 SERPL-SCNC: 26 MMOL/L (ref 23–31)
CREAT SERPL-MCNC: 6.59 MG/DL (ref 0.73–1.18)
EOSINOPHIL # BLD AUTO: 0.21 X10(3)/MCL (ref 0–0.9)
EOSINOPHIL NFR BLD AUTO: 4.3 %
ERYTHROCYTE [DISTWIDTH] IN BLOOD BY AUTOMATED COUNT: 16.3 % (ref 11.5–17)
GFR SERPLBLD CREATININE-BSD FMLA CKD-EPI: 8 MLS/MIN/1.73/M2
GLOBULIN SER-MCNC: 3.7 GM/DL (ref 2.4–3.5)
GLUCOSE SERPL-MCNC: 78 MG/DL (ref 82–115)
HCT VFR BLD AUTO: 36.9 % (ref 42–52)
HDLC SERPL-MCNC: 40 MG/DL (ref 35–60)
HGB BLD-MCNC: 11.9 G/DL (ref 14–18)
IMM GRANULOCYTES # BLD AUTO: 0.01 X10(3)/MCL (ref 0–0.04)
IMM GRANULOCYTES NFR BLD AUTO: 0.2 %
LDLC SERPL CALC-MCNC: 68 MG/DL (ref 50–140)
LYMPHOCYTES # BLD AUTO: 1.34 X10(3)/MCL (ref 0.6–4.6)
LYMPHOCYTES NFR BLD AUTO: 27.1 %
MCH RBC QN AUTO: 34.9 PG (ref 27–31)
MCHC RBC AUTO-ENTMCNC: 32.2 G/DL (ref 33–36)
MCV RBC AUTO: 108.2 FL (ref 80–94)
MONOCYTES # BLD AUTO: 0.52 X10(3)/MCL (ref 0.1–1.3)
MONOCYTES NFR BLD AUTO: 10.5 %
NEUTROPHILS # BLD AUTO: 2.82 X10(3)/MCL (ref 2.1–9.2)
NEUTROPHILS NFR BLD AUTO: 57.1 %
NRBC BLD AUTO-RTO: 0 %
PLATELET # BLD AUTO: 177 X10(3)/MCL (ref 130–400)
PMV BLD AUTO: 9.6 FL (ref 7.4–10.4)
POTASSIUM SERPL-SCNC: 4.3 MMOL/L (ref 3.5–5.1)
PROT SERPL-MCNC: 7.2 GM/DL (ref 5.8–7.6)
RBC # BLD AUTO: 3.41 X10(6)/MCL (ref 4.7–6.1)
SODIUM SERPL-SCNC: 143 MMOL/L (ref 136–145)
TRIGL SERPL-MCNC: 67 MG/DL (ref 34–140)
VLDLC SERPL CALC-MCNC: 13 MG/DL
WBC # SPEC AUTO: 4.94 X10(3)/MCL (ref 4.5–11.5)

## 2023-09-01 PROCEDURE — 80177 DRUG SCRN QUAN LEVETIRACETAM: CPT | Performed by: INTERNAL MEDICINE

## 2023-09-01 PROCEDURE — 80053 COMPREHEN METABOLIC PANEL: CPT | Performed by: INTERNAL MEDICINE

## 2023-09-01 PROCEDURE — 80061 LIPID PANEL: CPT | Performed by: INTERNAL MEDICINE

## 2023-09-01 PROCEDURE — 85025 COMPLETE CBC W/AUTO DIFF WBC: CPT | Performed by: INTERNAL MEDICINE

## 2023-09-02 LAB — LEVETIRACETAM SERPL-MCNC: 28.3 MCG/ML (ref 10–40)

## 2023-09-21 ENCOUNTER — TELEPHONE (OUTPATIENT)
Dept: CARDIOLOGY | Facility: HOSPITAL | Age: 78
End: 2023-09-21
Payer: MEDICARE

## 2023-09-21 NOTE — TELEPHONE ENCOUNTER
Endometrial Biopsy    Pre-Procedure Care:   Consent was obtained. Procedure/risks were explained. Questions were answered. Correct patient was identified. Correct side and site were confirmed. Pregnancy Results: n/a  Birth control method(s) used:     Pre-Medications: The patient was not premedicated. Description of Procedure:  Under satisfactory analgesia, the patient was prepped and draped in the dorsal lithotomy position. A bivalve speculum was placed in the vagina and the cervix was prepped with Betadine solution. Single tooth tenaculum placed at the 12 o'clock position. Cervical stenosis encountered. The cervix was dilated using Os finder  The uterine cavity was sounded at 7 cm. The endometrial cavity was curetted for pipelle tissue sampling, 2 passes. Specimen was sent to pathology. The single tooth tenaculum was removed. Good hemostasis was noted. There were no complications. There was no blood loss. Discharge instructions were provided to the patient. Visit Plan:  Await final pathology prior to treatment. HD center was contacted prior to patients catheter removal appt for 9/21/23. Dougie spoke with [facility] who states they had to use the catheter on 9/6/23 due to patient refusal, but then required the catheter on 9/13 due to difficulty cannulation requiring 1:1. They were unable to get an access flow on him as well. He is s/p fistulogram with PTA with Dr. Escobar 8/22/23. He is s/p RC Avf creation with Dr. Delatorre on 7/3/23. We recommend repeat procedure for further evaluation. At that time was may consider removing Catheter as well. Contacted Nursing home to schedule. Instructions were sent to HD center and NH for communication. [place]

## 2023-09-28 ENCOUNTER — HOSPITAL ENCOUNTER (OUTPATIENT)
Facility: HOSPITAL | Age: 78
Discharge: HOME | End: 2023-09-28
Attending: STUDENT IN AN ORGANIZED HEALTH CARE EDUCATION/TRAINING PROGRAM | Admitting: STUDENT IN AN ORGANIZED HEALTH CARE EDUCATION/TRAINING PROGRAM
Payer: MEDICARE

## 2023-09-28 VITALS
WEIGHT: 190.06 LBS | TEMPERATURE: 98 F | HEIGHT: 75 IN | OXYGEN SATURATION: 98 % | DIASTOLIC BLOOD PRESSURE: 71 MMHG | HEART RATE: 62 BPM | SYSTOLIC BLOOD PRESSURE: 115 MMHG | BODY MASS INDEX: 23.63 KG/M2

## 2023-09-28 DIAGNOSIS — T82.590A MECHANICAL COMPLICATION OF ARTERIOVENOUS FISTULA SURGICALLY CREATED, INITIAL ENCOUNTER: ICD-10-CM

## 2023-09-28 PROCEDURE — 99213 PR OFFICE/OUTPT VISIT, EST, LEVL III, 20-29 MIN: ICD-10-PCS | Mod: 25,,, | Performed by: STUDENT IN AN ORGANIZED HEALTH CARE EDUCATION/TRAINING PROGRAM

## 2023-09-28 PROCEDURE — 36902 INTRO CATH DIALYSIS CIRCUIT: CPT | Mod: LT | Performed by: STUDENT IN AN ORGANIZED HEALTH CARE EDUCATION/TRAINING PROGRAM

## 2023-09-28 PROCEDURE — 27201423 OPTIME MED/SURG SUP & DEVICES STERILE SUPPLY: Performed by: STUDENT IN AN ORGANIZED HEALTH CARE EDUCATION/TRAINING PROGRAM

## 2023-09-28 PROCEDURE — C1725 CATH, TRANSLUMIN NON-LASER: HCPCS | Performed by: STUDENT IN AN ORGANIZED HEALTH CARE EDUCATION/TRAINING PROGRAM

## 2023-09-28 PROCEDURE — 99152 PR MOD CONSCIOUS SEDATION, SAME PHYS, 5+ YRS, FIRST 15 MIN: ICD-10-PCS | Mod: ,,, | Performed by: STUDENT IN AN ORGANIZED HEALTH CARE EDUCATION/TRAINING PROGRAM

## 2023-09-28 PROCEDURE — 99213 OFFICE O/P EST LOW 20 MIN: CPT | Mod: 25,,, | Performed by: STUDENT IN AN ORGANIZED HEALTH CARE EDUCATION/TRAINING PROGRAM

## 2023-09-28 PROCEDURE — C1894 INTRO/SHEATH, NON-LASER: HCPCS | Performed by: STUDENT IN AN ORGANIZED HEALTH CARE EDUCATION/TRAINING PROGRAM

## 2023-09-28 PROCEDURE — 63600175 PHARM REV CODE 636 W HCPCS: Performed by: STUDENT IN AN ORGANIZED HEALTH CARE EDUCATION/TRAINING PROGRAM

## 2023-09-28 PROCEDURE — C1769 GUIDE WIRE: HCPCS | Performed by: STUDENT IN AN ORGANIZED HEALTH CARE EDUCATION/TRAINING PROGRAM

## 2023-09-28 PROCEDURE — 36902 INTRO CATH DIALYSIS CIRCUIT: CPT | Mod: LT,,, | Performed by: STUDENT IN AN ORGANIZED HEALTH CARE EDUCATION/TRAINING PROGRAM

## 2023-09-28 PROCEDURE — 99152 MOD SED SAME PHYS/QHP 5/>YRS: CPT | Performed by: STUDENT IN AN ORGANIZED HEALTH CARE EDUCATION/TRAINING PROGRAM

## 2023-09-28 PROCEDURE — 25500020 PHARM REV CODE 255: Performed by: STUDENT IN AN ORGANIZED HEALTH CARE EDUCATION/TRAINING PROGRAM

## 2023-09-28 PROCEDURE — 36902 PR INTRO CATH, DIALYSIS CIRCUIT W/TRANSLML BALLOON ANGIO: ICD-10-PCS | Mod: LT,,, | Performed by: STUDENT IN AN ORGANIZED HEALTH CARE EDUCATION/TRAINING PROGRAM

## 2023-09-28 PROCEDURE — 25000003 PHARM REV CODE 250: Performed by: STUDENT IN AN ORGANIZED HEALTH CARE EDUCATION/TRAINING PROGRAM

## 2023-09-28 PROCEDURE — 99152 MOD SED SAME PHYS/QHP 5/>YRS: CPT | Mod: ,,, | Performed by: STUDENT IN AN ORGANIZED HEALTH CARE EDUCATION/TRAINING PROGRAM

## 2023-09-28 RX ORDER — LIDOCAINE HYDROCHLORIDE 10 MG/ML
INJECTION INFILTRATION; PERINEURAL
Status: DISCONTINUED | OUTPATIENT
Start: 2023-09-28 | End: 2023-09-28 | Stop reason: HOSPADM

## 2023-09-28 RX ORDER — FENTANYL CITRATE 50 UG/ML
INJECTION, SOLUTION INTRAMUSCULAR; INTRAVENOUS
Status: DISCONTINUED | OUTPATIENT
Start: 2023-09-28 | End: 2023-09-28 | Stop reason: HOSPADM

## 2023-09-28 RX ORDER — SODIUM CHLORIDE 0.9 % (FLUSH) 0.9 %
10 SYRINGE (ML) INJECTION
Status: ACTIVE | OUTPATIENT
Start: 2023-09-28

## 2023-09-28 RX ORDER — MIDAZOLAM HYDROCHLORIDE 1 MG/ML
INJECTION INTRAMUSCULAR; INTRAVENOUS
Status: DISCONTINUED | OUTPATIENT
Start: 2023-09-28 | End: 2023-09-28 | Stop reason: HOSPADM

## 2023-09-28 NOTE — PROCEDURES
INTERVENTIONAL NEPHROLOGY PROCEDURE NOTE: FISTULOGRAM/GRAFTOGRAM         Patient Name: Angel Pablo Sr. MONTANO 1945    Procedure Date:    2023    Performing Physician:   Dr. Escobar    Access History: Pt is with ESRD on HD typically via left radiocephalic arteriovenous fistula who presents today with difficult cannulation.    Pre-Op Diagnosis: T82.590A Mechanical complication of surgically created arteriovenous shunt, initial encounter, N18.6 End Stage Renal Disease (ESRD)  Post-Op Diagnosis: Same    Procedure: Fistulogram with possible angioplasty and stent placement.    Indication: Nonfunctional/Dysfunctional/Malfunctioning HD access.    Informed Consent:  The patient was evaluated in the pre-operative area with assessment including the American Society of Anesthesia risk classification. The procedure is discussed in detail including risks, benefits alternatives and options and the patient agrees to proceed. Informed consent was obtained from the patient.     Maximum sterile barrier technique: The patient was prepped and draped using chlorhexidine prep and maximum sterile barrier technique.    Sedation Note:  Risks and benefits of sedation were reviewed with the patient or surrogate, including bleeding, infection, nausea, vomiting, dizziness, instability, damage to a nerve, damage to a blood vessel, cellulitis, reaction to medications, amnesia, loss of consciousness, respiratory arrest, cardiac arrest.     The patient received the following medications: Versed 0.5 mg IV and Fentanyl 25 mcg IV; patient did remain alert, responsive, and conversational throughout the procedure. I was personally responsible for supervising the administration of moderate sedation services during the procedure performed and I affirm all the guidelines and requirements described in the CPT 2023 section on moderate sedation were followed, including the use of an independent trained observer who had no other duties  during the procedure. The total face-to-face time was 15 minutes.    Procedure Steps:     The patient was prepped and draped in sterile fashion. Procedure ultrasound revealed patent vascular HD access.    Local anesthesia was administered by injecting 1% lidocaine at the intended site of cannulation. With use of live ultrasonographic visualization, the vascular access was successfully cannulated with a mini-stick needle aimed towards the inflow. After blood flashback was noted, the mini-stick wire was advanced through the needle. Via Seldinger technique, the needle was exchanged for the mini-stick sheath. Angiograms were completed which revealed 1 lesion(s) (summarized below). A 150 cm glide wire was advanced through the mini-stick sheath with the tip parked in the arterial anastomosis. A 6 Fr sheath was exchanged via Seldinger technique for the mini-stick sheath.    The aforementioned lesion(s) are as below, followed by their respective intervention(s) :  #1: Approximately 70% stenosis in the mid body of fistula (mBOF).  Angioplasty was performed at this location using a BD [place] 8 mm x 40 mm balloon. Approximately 100% effacement was obtained at 15-20 GEORGE and was held for 1-5 seconds. Post-angioplasty angiogram revealed 0% residual stenosis.    Lesions were treated in the following order: #1.    Wire and sheath were removed and hemostasis was achieved using a purse-string stitch and light digital pressure at the site of cannulation.    ASSESSMENT/PLAN:  - Successful angioplasty of the middle fistula.    EBL: 5 ml    Contrast: 19 ml    Complications: None    Post-op Instructions: The patient was given both verbal and written post-op instructions. If excessive bleeding at the site, they have been instructed to call their physician or proceed to a local emergency room.    Orders to the dialysis unit: OK to use access for dialysis needs.    Thank you for allowing me the opportunity in taking care of this patient.  Please reach me with any questions.    Akil Escobar,   Interventional Nephrology  Cell: [phone]

## 2023-09-28 NOTE — DISCHARGE SUMMARY
INTERVENTIONAL NEPHROLOGY DISCHARGE SUMMARY         Patient Name: Angel Pablo Sr. TEIXEIRAB. 1945    Procedure Date: 2023      In brief, Mr. Pablo underwent fistulogram of his left RC AVF 2/2 difficult cannulation. On physical exam, there was an area of suspected cannulation injury and stenosis. This was evident on angiogram in the middle fistula. We treated it with an 8 mm balloon.    The fistula is about 8 mm in diameter throughout the forearm. The arterial anastomosis does appear somewhat small, but given the overall maturity of the fistula, I do expect it to perform well for dialysis.    I would recommend cannulation of the fistula with a tourniquet in place. This will allow for proper engorgement of the fistula so that cannulation is easier, and will be less likely to have infiltrative injury. Please have expert cannulators attempt for now.    If cannulations are completed well for 1-2 weeks, we may remove the tunneled catheter.    Pre-Op Diagnosis: T82.590A Mechanical complication of surgically created arteriovenous shunt, initial encounter, N18.6 End Stage Renal Disease (ESRD)  Post-Op Diagnosis: Same.    Discharge Instructions/Recommendations:  - Continue use of fistula for dialysis.  - Pt may be discharged after successful monitoring in post-op area.  - Pt may resume home medications.    Thank you for allowing me the opportunity in taking care of this patient. Please reach me with any questions.    Akil Escobar DO  Interventional Nephrology  Cell: [phone]

## 2023-09-28 NOTE — H&P
INTERVENTIONAL NEPHROLOGY HISTORY & PHYSICAL       Patient Name: Angel Pablo Sr. MONTANO 1945    Date: 2023  Time: 9:29 AM         HPI: 77 y.o. male with ESRD on HD via left radiocephalic arteriovenous fistula who presents with difficult cannulation. This has required the use of his catheter in a few times in the last two weeks. Pt is being prepared for fistulogram with possible intervention today.    Pt seen and examined at bedside in [facility] this AM. Family is not present. Risks and benefits of fistulogram with possible intervention and intravenous conscious sedation was reviewed with the patient. The patient agrees to proceed with the intended procedure. Consents for both intravenous conscious sedation and procedure were signed and placed within the chart.       Review of Systems:  General:  No fatigue  Skin: No rashes  HEENT: No vision changes  CVS: No CP  RS: No SOB  GIT: No abdominal pain  Extremities: No swelling  Neurological:  No focal weakness  Psych: No depression    Past Medical History:   Diagnosis Date    Benign prostatic hyperplasia with lower urinary tract symptoms     Carotid artery stenosis     Dementia     Dialysis patient     M/W/F    End stage renal disease     H/O prostate cancer     Hemiplegia and hemiparesis following cerebral infarction affecting left dominant side     Hyperlipidemia     Hypertension     Hypokalemia     Insomnia     Insomnia     Mechanical breakdown of surgically created arteriovenous fistula, subsequent encounter     Muscle wasting and atrophy, not elsewhere classified, multiple sites     Other lack of coordination     Pain in left knee     Paralytic gait     Presbyopia     Presence of intraocular lens     Repeated falls     Seizures     Stroke       Past Surgical History:   Procedure Laterality Date    COLONOSCOPY      EXCISION OF ARTERIOVENOUS FISTULA Left 2023    Procedure: EXCISION, AV FISTULA;  Surgeon: Natalie Delatorre MD;  Location: [facility] OR;   Service: Peripheral Vascular;  Laterality: Left;  LEFT UPPER ARM FISTULA REVISION WITH TUNNELED CATHETER INSERTION // VASCULAR // SUPINE // SUPRACLAVICULAR BLOCK    FISTULOGRAM N/A 04/20/2023    Procedure: Fistulogram;  Surgeon: Akil Escobar DO;  Location: [facility] CATH LAB;  Service: Nephrology;  Laterality: N/A;  SANCHEZ FISTULOGRAM    FISTULOGRAM Left 08/22/2023    Dr. Escobar    FISTULOGRAM Left 8/22/2023    Procedure: Fistulogram;  Surgeon: Akil Escobar DO;  Location: [facility] CATH LAB;  Service: Nephrology;  Laterality: Left;    INSERTION, VASCULAR ACCESS CATHETER N/A 05/03/2023    Procedure: INSERTION, VASCULAR ACCESS CATHETER;  Surgeon: Natalie Delatorre MD;  Location: [facility] OR;  Service: Peripheral Vascular;  Laterality: N/A;    KNEE SURGERY Left     TRANSPOSITION OF BASILIC VEIN Left 07/03/2023    Procedure: TRANSPOSITION, VEIN, BASILIC;  Surgeon: Natalie Delatorre MD;  Location: [facility] OR;  Service: Peripheral Vascular;  Laterality: Left;  left radiocephalic vs basilic fistula creation, supraclavicular block // XX      Review of patient's allergies indicates:  No Known Allergies   Social History     Tobacco Use    Smoking status: Never    Smokeless tobacco: Never   Substance Use Topics    Alcohol use: Not Currently    Drug use: Never      Family History   Family history unknown: Yes         Current Facility-Administered Medications:     sodium chloride 0.9% flush 10 mL, 10 mL, Intravenous, PRN, Akil Escobar DO    Facility-Administered Medications Ordered in Other Encounters:     glycopyrrolate injection, , Intravenous, PRN, OkungserafinaArielPrieto O., CRNA, 0.2 mg at 07/03/23 1118    heparin (porcine) injection, , Intravenous, PRN, [place], Prieto O., CRNA, 4,000 Units at 07/03/23 1138    LIDOcaine (cardiac) injection, , Intravenous, PRN, [place]ser[place], Prieto O., CRNA, 80 mg at 07/03/23 1118    ondansetron injection, , Intravenous, PRN, [place], Prieto O., CRNA, 4 mg at 07/03/23 1135    propofol (DIPRIVAN) 10 mg/mL  infusion, , Intravenous, Continuous PRN, Prieto Avalos, CRNA, Stopped at 07/03/23 1212    protamine injection, , Intravenous, PRN, Robbie, Prieto BYRNES., CRNA, 25 mg at 07/03/23 1159    sodium chloride 0.9% flush 10 mL, 10 mL, Intravenous, PRN, Akil Escobar DO    sodium chloride 0.9% infusion, , Intravenous, Continuous PRN, Robbie, Prieto BYRNES., CRNA, Stopped at 07/03/23 1212    Vital Signs:  Temp:  [97.8 °F (36.6 °C)] 97.8 °F (36.6 °C)  Pulse:  [63] 63  SpO2:  [96 %] 96 %  BP: (121)/(66) 121/66     Physical Exam:  General: NAD  HEENT: NC/AT, EOMI  CVS: RRR.  RS: breathing easily.  Abdominal: Soft, NT/ND.  Extremities: No edema b/l LE  Skin: No rash, no lesions.  Neurological: No focal deficits.  Psych: Normal affect  Dialysis Access: left radiocephalic arteriovenous fistula with good thrill and mild pulsatility. Nodularity/induration at middle/distal graft, likely at a site of infiltration.    Results:    Lab Results   Component Value Date     09/01/2023    K 4.3 09/01/2023    CO2 26 09/01/2023    BUN 46.0 (H) 09/01/2023    CREATININE 6.59 (H) 09/01/2023     Lab Results   Component Value Date    WBC 4.94 09/01/2023    HGB 11.9 (L) 09/01/2023     09/01/2023    .2 (H) 09/01/2023       Assessment and Plan:      ESRD on HD via left radiocephalic arteriovenous fistula.  Mechanical complication of AVF.  Pt with ESRD on HD via left radiocephalic arteriovenous fistula who presents today with difficult cannulation. The pt is being prepared for fistulogram with possible intervention today.  - Consents obtained and placed within chart.  - Will proceed in cath lab setting today.    Please feel free to reach me with any questions.    Akil Escobar DO  Interventional Nephrology  Cell: [phone]

## 2023-09-28 NOTE — Clinical Note
19 ml of contrast were injected throughout the case. 41 mL of contrast was the total wasted during the case. 60 mL was the total amount used during the case.

## 2023-09-28 NOTE — Clinical Note
The balloon was inflated with indeflator in the  . The balloon max pressure was 8 kevin for 15 seconds

## 2023-10-30 ENCOUNTER — TELEPHONE (OUTPATIENT)
Dept: CARDIOLOGY | Facility: HOSPITAL | Age: 78
End: 2023-10-30
Payer: MEDICARE

## 2023-10-30 RX ORDER — LIDOCAINE HYDROCHLORIDE 10 MG/ML
10 INJECTION INFILTRATION; PERINEURAL ONCE
Status: CANCELLED | OUTPATIENT
Start: 2023-10-30 | End: 2023-10-30

## 2023-10-30 NOTE — TELEPHONE ENCOUNTER
Patient is scheduled for catheter removal tomorrow. Called the HD center to get information about how treatment is going. Nurse denies functional issues with fistula. She reports a recent clearance of 1.74. They did not draw access flow in October yet.     Called nursing home to verify scheduled time and she states he is on the books.     -ZT

## 2023-10-31 ENCOUNTER — HOSPITAL ENCOUNTER (OUTPATIENT)
Facility: HOSPITAL | Age: 78
Discharge: HOME | End: 2023-10-31
Attending: STUDENT IN AN ORGANIZED HEALTH CARE EDUCATION/TRAINING PROGRAM | Admitting: STUDENT IN AN ORGANIZED HEALTH CARE EDUCATION/TRAINING PROGRAM
Payer: MEDICARE

## 2023-10-31 VITALS
HEART RATE: 68 BPM | TEMPERATURE: 98 F | OXYGEN SATURATION: 97 % | DIASTOLIC BLOOD PRESSURE: 67 MMHG | SYSTOLIC BLOOD PRESSURE: 124 MMHG

## 2023-10-31 DIAGNOSIS — N18.6 ESRD (END STAGE RENAL DISEASE): ICD-10-CM

## 2023-10-31 PROCEDURE — 36589 REMOVAL TUNNELED CV CATH: CPT | Mod: RT | Performed by: STUDENT IN AN ORGANIZED HEALTH CARE EDUCATION/TRAINING PROGRAM

## 2023-10-31 PROCEDURE — 25000003 PHARM REV CODE 250: Performed by: STUDENT IN AN ORGANIZED HEALTH CARE EDUCATION/TRAINING PROGRAM

## 2023-10-31 PROCEDURE — 36589 REMOVAL TUNNELED CV CATH: CPT | Mod: RT,,, | Performed by: STUDENT IN AN ORGANIZED HEALTH CARE EDUCATION/TRAINING PROGRAM

## 2023-10-31 PROCEDURE — 36589 PR REMOVAL TUNNELED CV CATH W/O SUBQ PORT OR PUMP: ICD-10-PCS | Mod: RT,,, | Performed by: STUDENT IN AN ORGANIZED HEALTH CARE EDUCATION/TRAINING PROGRAM

## 2023-10-31 RX ORDER — LIDOCAINE HYDROCHLORIDE 10 MG/ML
INJECTION INFILTRATION; PERINEURAL
Status: DISCONTINUED | OUTPATIENT
Start: 2023-10-31 | End: 2023-10-31 | Stop reason: HOSPADM

## 2023-10-31 NOTE — H&P
INTERVENTIONAL NEPHROLOGY HISTORY & PHYSICAL       Patient Name: Angel Pablo Sr. MONTANO 1945    Date: 10/31/2023  Time: 1:27 PM         HPI: 78 y.o. male with ESRD on HD via left radiocephalic arteriovenous fistula who presents with mature access and need for removal of his RIJ/RCW tunneled catheter. Pt had successful cannulation of his access for the past two weeks (since his fistulogram). Pt is being prepared for tunneled dialysis catheter removal today.    Pt seen and examined at bedside in CVSS this AM. Family is not present. Risks and benefits of tunneled dialysis catheter removal was reviewed with the patient. The patient agrees to proceed with the intended procedure.     Review of Systems:  General:  No fatigue  Skin: No rashes  HEENT: No vision changes  CVS: No CP  RS: No SOB  GIT: No abdominal pain  Extremities: No swelling  Neurological:  No focal weakness  Psych: No depression    Past Medical History:   Diagnosis Date    Benign prostatic hyperplasia with lower urinary tract symptoms     Carotid artery stenosis     Dementia     Dialysis patient     M/W/F    End stage renal disease     H/O prostate cancer     Hemiplegia and hemiparesis following cerebral infarction affecting left dominant side     Hyperlipidemia     Hypertension     Hypokalemia     Insomnia     Insomnia     Mechanical breakdown of surgically created arteriovenous fistula, subsequent encounter     Muscle wasting and atrophy, not elsewhere classified, multiple sites     Other lack of coordination     Pain in left knee     Paralytic gait     Presbyopia     Presence of intraocular lens     Repeated falls     Seizures     Stroke       Past Surgical History:   Procedure Laterality Date    COLONOSCOPY      EXCISION OF ARTERIOVENOUS FISTULA Left 2023    Procedure: EXCISION, AV FISTULA;  Surgeon: Natalie Delatorre MD;  Location: [facility];  Service: Peripheral Vascular;  Laterality: Left;  LEFT UPPER ARM FISTULA REVISION WITH  TUNNELED CATHETER INSERTION // VASCULAR // SUPINE // SUPRACLAVICULAR BLOCK    FISTULOGRAM N/A 04/20/2023    Procedure: Fistulogram;  Surgeon: Akil Escobar DO;  Location: [facility] CATH LAB;  Service: Nephrology;  Laterality: N/A;  LUE FISTULOGRAM    FISTULOGRAM Left 08/22/2023    Dr. Escobar    FISTULOGRAM Left 8/22/2023    Procedure: Fistulogram;  Surgeon: Akil Escobar DO;  Location: [facility] CATH LAB;  Service: Nephrology;  Laterality: Left;    FISTULOGRAM Left 9/28/2023    Procedure: Fistulogram;  Surgeon: Akil Escobar DO;  Location: [facility] CATH LAB;  Service: Nephrology;  Laterality: Left;    INSERTION, VASCULAR ACCESS CATHETER N/A 05/03/2023    Procedure: INSERTION, VASCULAR ACCESS CATHETER;  Surgeon: Natalie Delatorre MD;  Location: [facility] OR;  Service: Peripheral Vascular;  Laterality: N/A;    KNEE SURGERY Left     REMOVAL OF TUNNELED CENTRAL VENOUS CATHETER (CVC) N/A 9/28/2023    Procedure: REMOVAL, CATHETER, CENTRAL VENOUS, TUNNELED;  Surgeon: Akil Escobar DO;  Location: [facility] CATH LAB;  Service: Nephrology;  Laterality: N/A;    TRANSPOSITION OF BASILIC VEIN Left 07/03/2023    Procedure: TRANSPOSITION, VEIN, BASILIC;  Surgeon: Natalie Delatorre MD;  Location: [facility] OR;  Service: Peripheral Vascular;  Laterality: Left;  left radiocephalic vs basilic fistula creation, supraclavicular block // XX      Review of patient's allergies indicates:  No Known Allergies   Social History     Tobacco Use    Smoking status: Never    Smokeless tobacco: Never   Substance Use Topics    Alcohol use: Not Currently    Drug use: Never      Family History   Family history unknown: Yes       No current facility-administered medications for this encounter.    Facility-Administered Medications Ordered in Other Encounters:     glycopyrrolate injection, , Intravenous, PRN, Prieto Avalos, CRNA, 0.2 mg at 07/03/23 1118    heparin (porcine) injection, , Intravenous, PRN, Prieto Avalos, CRNA, 4,000 Units at 07/03/23 1138     LIDOcaine (cardiac) injection, , Intravenous, PRN, [place], Prieto O., CRNA, 80 mg at 07/03/23 1118    ondansetron injection, , Intravenous, PRN, [place], Prieto O., CRNA, 4 mg at 07/03/23 1135    propofol (DIPRIVAN) 10 mg/mL infusion, , Intravenous, Continuous PRN, [place], Prieto O., CRNA, Stopped at 07/03/23 1212    protamine injection, , Intravenous, PRN, [place], Prieto O., CRNA, 25 mg at 07/03/23 1159    sodium chloride 0.9% flush 10 mL, 10 mL, Intravenous, PRN, Hasan, Akil, DO    sodium chloride 0.9% flush 10 mL, 10 mL, Intravenous, PRN, Hasan, Akil, DO    sodium chloride 0.9% infusion, , Intravenous, Continuous PRN, [place], Prieto O., CRNA, Stopped at 07/03/23 1212    Vital Signs:  Temp:  [98 °F (36.7 °C)] 98 °F (36.7 °C)  Pulse:  [68] 68  SpO2:  [97 %] 97 %  BP: (124)/(67) 124/67     Physical Exam:  General: NAD, elderly.  HEENT: NC/AT, EOMI.  CVS: RRR.  RS: breathing easily.  Abdominal: Soft, NT/ND.  Extremities: No edema b/l LE.  Skin: No rash, no lesions.  Neurological: No focal deficits.  Psych: Normal affect  Dialysis Access: right internal jugular (RIJ)/right chest wall (RCW) tunneled dialysis catheter (TDC) without signs of bleeding/infection.    Results:    Lab Results   Component Value Date     09/01/2023    K 4.3 09/01/2023    CO2 26 09/01/2023    BUN 46.0 (H) 09/01/2023    CREATININE 6.59 (H) 09/01/2023     Lab Results   Component Value Date    WBC 4.94 09/01/2023    HGB 11.9 (L) 09/01/2023     09/01/2023    .2 (H) 09/01/2023       Assessment and Plan:      ESRD on HD via left radiocephalic arteriovenous fistula.  No longer requiring tunneled dialysis catheter.  Pt with ESRD on HD via left radiocephalic arteriovenous fistula who presents today with for tunneled catheter removal (as it is no longer being used). The pt is being prepared for tunneled dialysis catheter removal today.  - Consents obtained and placed within chart.  - Will proceed in cath lab setting  today.    Please feel free to reach me with any questions.    Akil Escobar,   Interventional Nephrology  Cell: [phone]

## 2023-10-31 NOTE — Clinical Note
Manual pressure was applied to the right internal jugular vein sheath insertion site. For minimum of 10 minutes

## 2023-10-31 NOTE — DISCHARGE SUMMARY
INTERVENTIONAL NEPHROLOGY DISCHARGE SUMMARY         Patient Name: Angel Pablo Sr. MONTANO 1945    Procedure Date: 10/31/2023      In brief, Mr. Pablo underwent uneventful tunneled dialysis catheter removal.    Pre-Op Diagnosis: N18.6 End Stage Renal Disease (ESRD).  Post-Op Diagnosis: Same.    Discharge Instructions/Recommendations:  - Continue use of fistula.  - Pt may be discharged after successful monitoring in post-op area.  - Pt may resume home medications.    Thank you for allowing me the opportunity in taking care of this patient. Please reach me with any questions.    Akil Escobar DO  Interventional Nephrology  Cell: [phone]

## 2023-10-31 NOTE — PROCEDURES
INTERVENTIONAL NEPHROLOGY PROCEDURE NOTE:   TUNNELED DIALYSIS CATHETER (TDC) REMOVAL       Patient Name: Angel Pablo Sr. MONTANO 1945    Procedure Date:    10/31/2023      Performing Physician:   Dr. Escobar    Access History: Pt is with ESRD requiring HD, now with functional permanent access in arm.    Pre-Op Diagnosis: N18.6 End Stage Renal Disease (ESRD).  Post-Op Diagnosis: Same    Procedure: Tunneled dialysis catheter removal.    Indication: ESRD requiring HD no longer requiring catheter.    Anatomical Site: right internal jugular (RIJ)/right chest wall (RCW)    Informed Consent:  The patient was evaluated in the pre-operative area with assessment including the American Society of Anesthesia risk classification. The procedure is discussed in detail including risks, benefits alternatives and options and the patient agrees to proceed. Informed consent was obtained from the patient.     Maximum sterile barrier technique: The patient was prepped and draped using chlorhexidine prep and maximum sterile barrier technique.    Procedure Steps:   The original dressing overlying the catheter was removed. The patient was prepped and draped in sterile fashion. The catheter was gently pulled to assess if the catheter cuff was already released. It was found to be stationed well within the tunnel. There were no obvious signs of infection.    The suture anchoring the catheter hub to the skin was removed. The area, specifically the exit site and tunnel of the catheter was infiltrated with 1% lidocaine. Moments later, blunt dissection with mosquito-tipped hemostats was completed to free the internalized tunneled catheter cuff. Once found to be freely moveable, the catheter was removed in a smooth motion. Pressure was held at the suspected venotomy site for ten minutes and hemostasis was found to be achieved. A folded 4 x 4 gauze was placed at the exit site, with a Tegaderm applied over to supply as a  dressing.    ASSESSMENT/PLAN:  - Successful removal of right internal jugular (RIJ)/right chest wall (RCW) tunneled dialysis catheter.    EBL: 5 ml    Complications: None    Thank you for allowing me the opportunity in taking care of this patient. Please reach me with any questions.    Akil Escobar DO  Interventional Nephrology  Cell: [phone]

## 2023-11-06 NOTE — ANESTHESIA POSTPROCEDURE EVALUATION
Anesthesia Post Evaluation    Patient: Angel Pablo SrSheng    Procedure(s) Performed: Procedure(s) (LRB):  TRANSPOSITION, VEIN, BASILIC (Left)    Final Anesthesia Type: general (/Regional//MAC)      Patient location during evaluation: PACU  Post-procedure mental status: @ basline.  Pain management: adequate    PONV status: See postop meds for drugs used to control n/v if any.  Anesthetic complications: no      Cardiovascular status: blood pressure returned to baseline  Respiratory status: @ baseline.  Hydration status: euvolemic            Vitals Value Taken Time   /77 07/03/23 1312   Temp 98 11/06/23 0927   Pulse 70 07/03/23 1316   Resp 16 11/06/23 0927   SpO2 98 % 07/03/23 1316         No case tracking events are documented in the log.      Pain/Imtiaz Score: No data recorded

## 2023-12-07 ENCOUNTER — LAB REQUISITION (OUTPATIENT)
Dept: LAB | Facility: HOSPITAL | Age: 78
End: 2023-12-07
Payer: MEDICARE

## 2023-12-07 DIAGNOSIS — R53.1 WEAKNESS: ICD-10-CM

## 2023-12-07 DIAGNOSIS — R41.0 DISORIENTATION, UNSPECIFIED: ICD-10-CM

## 2023-12-07 LAB
ALBUMIN SERPL-MCNC: 3.2 G/DL (ref 3.4–4.8)
ALBUMIN/GLOB SERPL: 0.9 RATIO (ref 1.1–2)
ALP SERPL-CCNC: 69 UNIT/L (ref 40–150)
ALT SERPL-CCNC: 16 UNIT/L (ref 0–55)
AST SERPL-CCNC: 14 UNIT/L (ref 5–34)
BASOPHILS # BLD AUTO: NORMAL 10*3/UL
BASOPHILS NFR BLD AUTO: NORMAL %
BILIRUB SERPL-MCNC: 0.4 MG/DL
BUN SERPL-MCNC: 32.8 MG/DL (ref 8.4–25.7)
CALCIUM SERPL-MCNC: 9.1 MG/DL (ref 8.8–10)
CHLORIDE SERPL-SCNC: 102 MMOL/L (ref 98–107)
CO2 SERPL-SCNC: 29 MMOL/L (ref 23–31)
CREAT SERPL-MCNC: 5.82 MG/DL (ref 0.73–1.18)
EOSINOPHIL # BLD AUTO: NORMAL 10*3/UL
EOSINOPHIL NFR BLD AUTO: NORMAL %
ERYTHROCYTE [DISTWIDTH] IN BLOOD BY AUTOMATED COUNT: NORMAL %
GFR SERPLBLD CREATININE-BSD FMLA CKD-EPI: 9 MLS/MIN/1.73/M2
GLOBULIN SER-MCNC: 3.5 GM/DL (ref 2.4–3.5)
GLUCOSE SERPL-MCNC: 87 MG/DL (ref 82–115)
HCT VFR BLD AUTO: NORMAL %
HGB BLD-MCNC: NORMAL G/DL
IMM GRANULOCYTES # BLD AUTO: NORMAL 10*3/UL
IMM GRANULOCYTES NFR BLD AUTO: NORMAL %
LYMPHOCYTES # BLD AUTO: NORMAL 10*3/UL
LYMPHOCYTES NFR BLD AUTO: NORMAL %
MCH RBC QN AUTO: NORMAL PG
MCHC RBC AUTO-ENTMCNC: NORMAL G/DL
MCV RBC AUTO: NORMAL FL
MONOCYTES # BLD AUTO: NORMAL 10*3/UL
MONOCYTES NFR BLD AUTO: NORMAL %
NEUTROPHILS # BLD AUTO: NORMAL 10*3/UL
NEUTROPHILS NFR BLD AUTO: NORMAL %
NRBC BLD AUTO-RTO: NORMAL %
PLATELET # BLD AUTO: NORMAL 10*3/UL
PMV BLD AUTO: NORMAL FL
POTASSIUM SERPL-SCNC: 4.7 MMOL/L (ref 3.5–5.1)
PROT SERPL-MCNC: 6.7 GM/DL (ref 5.8–7.6)
RBC # BLD AUTO: NORMAL 10*6/UL
SODIUM SERPL-SCNC: 141 MMOL/L (ref 136–145)
WBC # SPEC AUTO: NORMAL 10*3/UL

## 2023-12-07 PROCEDURE — 85025 COMPLETE CBC W/AUTO DIFF WBC: CPT | Mod: 91 | Performed by: NURSE PRACTITIONER

## 2023-12-07 PROCEDURE — 80053 COMPREHEN METABOLIC PANEL: CPT | Performed by: NURSE PRACTITIONER

## 2023-12-11 ENCOUNTER — LAB REQUISITION (OUTPATIENT)
Dept: LAB | Facility: HOSPITAL | Age: 78
End: 2023-12-11
Payer: MEDICARE

## 2023-12-11 DIAGNOSIS — E87.6 HYPOKALEMIA: ICD-10-CM

## 2023-12-11 DIAGNOSIS — I10 ESSENTIAL (PRIMARY) HYPERTENSION: ICD-10-CM

## 2023-12-11 LAB
ALBUMIN SERPL-MCNC: 3.5 G/DL (ref 3.4–4.8)
ALBUMIN/GLOB SERPL: 0.8 RATIO (ref 1.1–2)
ALP SERPL-CCNC: 76 UNIT/L (ref 40–150)
ALT SERPL-CCNC: 18 UNIT/L (ref 0–55)
AST SERPL-CCNC: 14 UNIT/L (ref 5–34)
BILIRUB SERPL-MCNC: 0.4 MG/DL
BUN SERPL-MCNC: 26.3 MG/DL (ref 8.4–25.7)
CALCIUM SERPL-MCNC: 9.3 MG/DL (ref 8.8–10)
CHLORIDE SERPL-SCNC: 96 MMOL/L (ref 98–107)
CO2 SERPL-SCNC: 30 MMOL/L (ref 23–31)
CREAT SERPL-MCNC: 4.34 MG/DL (ref 0.73–1.18)
ERYTHROCYTE [DISTWIDTH] IN BLOOD BY AUTOMATED COUNT: 15.9 % (ref 11.5–17)
GFR SERPLBLD CREATININE-BSD FMLA CKD-EPI: 13 MLS/MIN/1.73/M2
GLOBULIN SER-MCNC: 4.5 GM/DL (ref 2.4–3.5)
GLUCOSE SERPL-MCNC: 90 MG/DL (ref 82–115)
HCT VFR BLD AUTO: 33.7 % (ref 42–52)
HGB BLD-MCNC: 11 G/DL (ref 14–18)
MCH RBC QN AUTO: 34.2 PG (ref 27–31)
MCHC RBC AUTO-ENTMCNC: 32.6 G/DL (ref 33–36)
MCV RBC AUTO: 104.7 FL (ref 80–94)
NRBC BLD AUTO-RTO: 0 %
PLATELET # BLD AUTO: 226 X10(3)/MCL (ref 130–400)
PMV BLD AUTO: 8.9 FL (ref 7.4–10.4)
POTASSIUM SERPL-SCNC: 3.8 MMOL/L (ref 3.5–5.1)
PROT SERPL-MCNC: 8 GM/DL (ref 5.8–7.6)
RBC # BLD AUTO: 3.22 X10(6)/MCL (ref 4.7–6.1)
SODIUM SERPL-SCNC: 138 MMOL/L (ref 136–145)
WBC # SPEC AUTO: 5.88 X10(3)/MCL (ref 4.5–11.5)

## 2023-12-11 PROCEDURE — 85027 COMPLETE CBC AUTOMATED: CPT | Performed by: NURSE PRACTITIONER

## 2023-12-11 PROCEDURE — 80053 COMPREHEN METABOLIC PANEL: CPT | Performed by: NURSE PRACTITIONER

## 2023-12-13 ENCOUNTER — LAB REQUISITION (OUTPATIENT)
Dept: LAB | Facility: HOSPITAL | Age: 78
End: 2023-12-13
Payer: MEDICARE

## 2023-12-13 DIAGNOSIS — N39.0 URINARY TRACT INFECTION, SITE NOT SPECIFIED: ICD-10-CM

## 2023-12-13 LAB
APPEARANCE UR: ABNORMAL
BACTERIA #/AREA URNS AUTO: ABNORMAL /HPF
BILIRUB UR QL STRIP.AUTO: NEGATIVE
COLOR UR AUTO: YELLOW
GLUCOSE UR QL STRIP.AUTO: NEGATIVE
KETONES UR QL STRIP.AUTO: NEGATIVE
LEUKOCYTE ESTERASE UR QL STRIP.AUTO: ABNORMAL
NITRITE UR QL STRIP.AUTO: NEGATIVE
PH UR STRIP.AUTO: 8.5 [PH]
PROT UR QL STRIP.AUTO: 100
RBC #/AREA URNS AUTO: ABNORMAL /HPF
RBC UR QL AUTO: ABNORMAL
SP GR UR STRIP.AUTO: 1.01 (ref 1–1.03)
SQUAMOUS #/AREA URNS AUTO: ABNORMAL /HPF
TRI-PHOS CRY URNS QL MICRO: ABNORMAL /HPF
UROBILINOGEN UR STRIP-ACNC: 0.2
WBC #/AREA URNS AUTO: ABNORMAL /HPF

## 2023-12-13 PROCEDURE — 81001 URINALYSIS AUTO W/SCOPE: CPT | Performed by: NURSE PRACTITIONER

## 2023-12-13 PROCEDURE — 87086 URINE CULTURE/COLONY COUNT: CPT | Performed by: NURSE PRACTITIONER

## 2023-12-15 LAB — BACTERIA UR CULT: NORMAL

## 2024-03-01 ENCOUNTER — LAB REQUISITION (OUTPATIENT)
Dept: LAB | Facility: HOSPITAL | Age: 79
End: 2024-03-01
Payer: MEDICARE

## 2024-03-01 DIAGNOSIS — E87.6 HYPOKALEMIA: ICD-10-CM

## 2024-03-01 DIAGNOSIS — E78.49 OTHER HYPERLIPIDEMIA: ICD-10-CM

## 2024-03-01 DIAGNOSIS — N40.1 BENIGN PROSTATIC HYPERPLASIA WITH LOWER URINARY TRACT SYMPTOMS: ICD-10-CM

## 2024-03-01 LAB
ALBUMIN SERPL-MCNC: 3.1 G/DL (ref 3.4–4.8)
ALBUMIN/GLOB SERPL: 0.9 RATIO (ref 1.1–2)
ALP SERPL-CCNC: 73 UNIT/L (ref 40–150)
ALT SERPL-CCNC: 16 UNIT/L (ref 0–55)
AST SERPL-CCNC: 14 UNIT/L (ref 5–34)
BASOPHILS # BLD AUTO: 0.02 X10(3)/MCL
BASOPHILS NFR BLD AUTO: 0.4 %
BILIRUB SERPL-MCNC: 0.3 MG/DL
BUN SERPL-MCNC: 29.4 MG/DL (ref 8.4–25.7)
CALCIUM SERPL-MCNC: 9.6 MG/DL (ref 8.8–10)
CHLORIDE SERPL-SCNC: 102 MMOL/L (ref 98–107)
CHOLEST SERPL-MCNC: 110 MG/DL
CHOLEST/HDLC SERPL: 3 {RATIO} (ref 0–5)
CO2 SERPL-SCNC: 26 MMOL/L (ref 23–31)
CREAT SERPL-MCNC: 6.04 MG/DL (ref 0.73–1.18)
EOSINOPHIL # BLD AUTO: 0.21 X10(3)/MCL (ref 0–0.9)
EOSINOPHIL NFR BLD AUTO: 4.2 %
ERYTHROCYTE [DISTWIDTH] IN BLOOD BY AUTOMATED COUNT: 14.9 % (ref 11.5–17)
GFR SERPLBLD CREATININE-BSD FMLA CKD-EPI: 9 MLS/MIN/1.73/M2
GLOBULIN SER-MCNC: 3.4 GM/DL (ref 2.4–3.5)
GLUCOSE SERPL-MCNC: 83 MG/DL (ref 82–115)
HCT VFR BLD AUTO: 30.6 % (ref 42–52)
HDLC SERPL-MCNC: 39 MG/DL (ref 35–60)
HGB BLD-MCNC: 10.1 G/DL (ref 14–18)
IMM GRANULOCYTES # BLD AUTO: 0.02 X10(3)/MCL (ref 0–0.04)
IMM GRANULOCYTES NFR BLD AUTO: 0.4 %
LDLC SERPL CALC-MCNC: 58 MG/DL (ref 50–140)
LYMPHOCYTES # BLD AUTO: 1.41 X10(3)/MCL (ref 0.6–4.6)
LYMPHOCYTES NFR BLD AUTO: 28.3 %
MCH RBC QN AUTO: 34.5 PG (ref 27–31)
MCHC RBC AUTO-ENTMCNC: 33 G/DL (ref 33–36)
MCV RBC AUTO: 104.4 FL (ref 80–94)
MONOCYTES # BLD AUTO: 0.53 X10(3)/MCL (ref 0.1–1.3)
MONOCYTES NFR BLD AUTO: 10.6 %
NEUTROPHILS # BLD AUTO: 2.79 X10(3)/MCL (ref 2.1–9.2)
NEUTROPHILS NFR BLD AUTO: 56.1 %
NRBC BLD AUTO-RTO: 0 %
PLATELET # BLD AUTO: 217 X10(3)/MCL (ref 130–400)
PMV BLD AUTO: 9 FL (ref 7.4–10.4)
POTASSIUM SERPL-SCNC: 4.2 MMOL/L (ref 3.5–5.1)
PROT SERPL-MCNC: 6.5 GM/DL (ref 5.8–7.6)
PSA SERPL-MCNC: 0.56 NG/ML
RBC # BLD AUTO: 2.93 X10(6)/MCL (ref 4.7–6.1)
SODIUM SERPL-SCNC: 139 MMOL/L (ref 136–145)
TRIGL SERPL-MCNC: 63 MG/DL (ref 34–140)
VLDLC SERPL CALC-MCNC: 13 MG/DL
WBC # SPEC AUTO: 4.98 X10(3)/MCL (ref 4.5–11.5)

## 2024-03-01 PROCEDURE — 80177 DRUG SCRN QUAN LEVETIRACETAM: CPT | Performed by: INTERNAL MEDICINE

## 2024-03-01 PROCEDURE — 84153 ASSAY OF PSA TOTAL: CPT | Performed by: INTERNAL MEDICINE

## 2024-03-01 PROCEDURE — 80061 LIPID PANEL: CPT | Performed by: INTERNAL MEDICINE

## 2024-03-01 PROCEDURE — 85025 COMPLETE CBC W/AUTO DIFF WBC: CPT | Performed by: INTERNAL MEDICINE

## 2024-03-01 PROCEDURE — 80053 COMPREHEN METABOLIC PANEL: CPT | Performed by: INTERNAL MEDICINE

## 2024-03-02 LAB — LEVETIRACETAM SERPL-MCNC: 18.8 MCG/ML (ref 10–40)

## 2024-06-03 ENCOUNTER — LAB REQUISITION (OUTPATIENT)
Dept: LAB | Facility: HOSPITAL | Age: 79
End: 2024-06-03
Payer: MEDICARE

## 2024-06-03 DIAGNOSIS — R55 SYNCOPE AND COLLAPSE: ICD-10-CM

## 2024-06-03 DIAGNOSIS — I95.9 HYPOTENSION, UNSPECIFIED: ICD-10-CM

## 2024-06-03 LAB
ALBUMIN SERPL-MCNC: 3.5 G/DL (ref 3.4–4.8)
ALBUMIN/GLOB SERPL: 0.8 RATIO (ref 1.1–2)
ALP SERPL-CCNC: 84 UNIT/L (ref 40–150)
ALT SERPL-CCNC: 16 UNIT/L (ref 0–55)
ANION GAP SERPL CALC-SCNC: 13 MEQ/L
AST SERPL-CCNC: 16 UNIT/L (ref 5–34)
BASOPHILS # BLD AUTO: 0.02 X10(3)/MCL
BASOPHILS NFR BLD AUTO: 0.3 %
BILIRUB SERPL-MCNC: 0.3 MG/DL
BUN SERPL-MCNC: 20.9 MG/DL (ref 8.4–25.7)
CALCIUM SERPL-MCNC: 9.1 MG/DL (ref 8.8–10)
CHLORIDE SERPL-SCNC: 97 MMOL/L (ref 98–107)
CO2 SERPL-SCNC: 31 MMOL/L (ref 23–31)
CREAT SERPL-MCNC: 4.75 MG/DL (ref 0.73–1.18)
CREAT/UREA NIT SERPL: 4
EOSINOPHIL # BLD AUTO: 0.13 X10(3)/MCL (ref 0–0.9)
EOSINOPHIL NFR BLD AUTO: 2.2 %
ERYTHROCYTE [DISTWIDTH] IN BLOOD BY AUTOMATED COUNT: 17 % (ref 11.5–17)
GFR SERPLBLD CREATININE-BSD FMLA CKD-EPI: 12 ML/MIN/1.73/M2
GLOBULIN SER-MCNC: 4.4 GM/DL (ref 2.4–3.5)
GLUCOSE SERPL-MCNC: 102 MG/DL (ref 82–115)
HCT VFR BLD AUTO: 32.3 % (ref 42–52)
HGB BLD-MCNC: 10.7 G/DL (ref 14–18)
IMM GRANULOCYTES # BLD AUTO: 0.03 X10(3)/MCL (ref 0–0.04)
IMM GRANULOCYTES NFR BLD AUTO: 0.5 %
LACTATE SERPL-SCNC: 2.9 MMOL/L (ref 0.5–2.2)
LYMPHOCYTES # BLD AUTO: 1.12 X10(3)/MCL (ref 0.6–4.6)
LYMPHOCYTES NFR BLD AUTO: 19.4 %
MCH RBC QN AUTO: 35.4 PG (ref 27–31)
MCHC RBC AUTO-ENTMCNC: 33.1 G/DL (ref 33–36)
MCV RBC AUTO: 107 FL (ref 80–94)
MONOCYTES # BLD AUTO: 0.56 X10(3)/MCL (ref 0.1–1.3)
MONOCYTES NFR BLD AUTO: 9.7 %
NEUTROPHILS # BLD AUTO: 3.92 X10(3)/MCL (ref 2.1–9.2)
NEUTROPHILS NFR BLD AUTO: 67.9 %
NRBC BLD AUTO-RTO: 0.5 %
PLATELET # BLD AUTO: 245 X10(3)/MCL (ref 130–400)
PMV BLD AUTO: 8.8 FL (ref 7.4–10.4)
POTASSIUM SERPL-SCNC: 3.3 MMOL/L (ref 3.5–5.1)
PROT SERPL-MCNC: 7.9 GM/DL (ref 5.8–7.6)
RBC # BLD AUTO: 3.02 X10(6)/MCL (ref 4.7–6.1)
SODIUM SERPL-SCNC: 141 MMOL/L (ref 136–145)
WBC # SPEC AUTO: 5.78 X10(3)/MCL (ref 4.5–11.5)

## 2024-06-03 PROCEDURE — 83605 ASSAY OF LACTIC ACID: CPT | Performed by: NURSE PRACTITIONER

## 2024-06-03 PROCEDURE — 85025 COMPLETE CBC W/AUTO DIFF WBC: CPT | Performed by: NURSE PRACTITIONER

## 2024-06-03 PROCEDURE — 80053 COMPREHEN METABOLIC PANEL: CPT | Performed by: NURSE PRACTITIONER

## 2024-06-04 PROCEDURE — 87086 URINE CULTURE/COLONY COUNT: CPT | Performed by: NURSE PRACTITIONER

## 2024-06-04 PROCEDURE — 81001 URINALYSIS AUTO W/SCOPE: CPT | Performed by: NURSE PRACTITIONER

## 2024-06-04 PROCEDURE — 87077 CULTURE AEROBIC IDENTIFY: CPT | Performed by: NURSE PRACTITIONER

## 2024-06-05 ENCOUNTER — LAB REQUISITION (OUTPATIENT)
Dept: LAB | Facility: HOSPITAL | Age: 79
End: 2024-06-05
Payer: MEDICARE

## 2024-06-05 DIAGNOSIS — R55 SYNCOPE AND COLLAPSE: ICD-10-CM

## 2024-06-05 DIAGNOSIS — R41.82 ALTERED MENTAL STATUS, UNSPECIFIED: ICD-10-CM

## 2024-06-05 DIAGNOSIS — R74.02 ELEVATION OF LEVELS OF LACTIC ACID DEHYDROGENASE (LDH): ICD-10-CM

## 2024-06-05 LAB
AMORPH PHOS CRY URNS QL MICRO: ABNORMAL /HPF
BACTERIA #/AREA URNS AUTO: ABNORMAL /HPF
BILIRUB UR QL STRIP.AUTO: ABNORMAL
CLARITY UR: ABNORMAL
COLOR UR AUTO: ABNORMAL
GLUCOSE UR QL STRIP: NEGATIVE
HGB UR QL STRIP: NEGATIVE
KETONES UR QL STRIP: ABNORMAL
LACTATE SERPL-SCNC: 1.6 MMOL/L (ref 0.5–2.2)
LEUKOCYTE ESTERASE UR QL STRIP: ABNORMAL
NITRITE UR QL STRIP: NEGATIVE
PH UR STRIP: 8 [PH]
PROT UR QL STRIP: 100
RBC #/AREA URNS AUTO: ABNORMAL /HPF
SP GR UR STRIP.AUTO: 1.02 (ref 1–1.03)
SQUAMOUS #/AREA URNS AUTO: ABNORMAL /HPF
UROBILINOGEN UR STRIP-ACNC: 1
WBC #/AREA URNS AUTO: ABNORMAL /HPF

## 2024-06-05 PROCEDURE — 83605 ASSAY OF LACTIC ACID: CPT | Performed by: NURSE PRACTITIONER

## 2024-06-07 LAB — BACTERIA UR CULT: NORMAL

## 2024-06-20 ENCOUNTER — LAB REQUISITION (OUTPATIENT)
Dept: LAB | Facility: HOSPITAL | Age: 79
End: 2024-06-20
Payer: MEDICARE

## 2024-06-20 DIAGNOSIS — R63.0 ANOREXIA: ICD-10-CM

## 2024-06-20 LAB
ALBUMIN SERPL-MCNC: 3.3 G/DL (ref 3.4–4.8)
ALBUMIN/GLOB SERPL: 0.8 RATIO (ref 1.1–2)
ALP SERPL-CCNC: 70 UNIT/L (ref 40–150)
ALT SERPL-CCNC: 15 UNIT/L (ref 0–55)
ANION GAP SERPL CALC-SCNC: 8 MEQ/L
AST SERPL-CCNC: 18 UNIT/L (ref 5–34)
BASOPHILS # BLD AUTO: 0.02 X10(3)/MCL
BASOPHILS NFR BLD AUTO: 0.2 %
BILIRUB SERPL-MCNC: 0.4 MG/DL
BUN SERPL-MCNC: 27.3 MG/DL (ref 8.4–25.7)
CALCIUM SERPL-MCNC: 10.2 MG/DL (ref 8.8–10)
CHLORIDE SERPL-SCNC: 102 MMOL/L (ref 98–107)
CO2 SERPL-SCNC: 28 MMOL/L (ref 23–31)
CREAT SERPL-MCNC: 6.45 MG/DL (ref 0.73–1.18)
CREAT/UREA NIT SERPL: 4
EOSINOPHIL # BLD AUTO: 0.03 X10(3)/MCL (ref 0–0.9)
EOSINOPHIL NFR BLD AUTO: 0.3 %
ERYTHROCYTE [DISTWIDTH] IN BLOOD BY AUTOMATED COUNT: 16.1 % (ref 11.5–17)
GFR SERPLBLD CREATININE-BSD FMLA CKD-EPI: 8 ML/MIN/1.73/M2
GLOBULIN SER-MCNC: 4.2 GM/DL (ref 2.4–3.5)
GLUCOSE SERPL-MCNC: 101 MG/DL (ref 82–115)
HCT VFR BLD AUTO: 32.6 % (ref 42–52)
HGB BLD-MCNC: 10.5 G/DL (ref 14–18)
IMM GRANULOCYTES # BLD AUTO: 0.03 X10(3)/MCL (ref 0–0.04)
IMM GRANULOCYTES NFR BLD AUTO: 0.3 %
LYMPHOCYTES # BLD AUTO: 0.86 X10(3)/MCL (ref 0.6–4.6)
LYMPHOCYTES NFR BLD AUTO: 9.9 %
MCH RBC QN AUTO: 34.1 PG (ref 27–31)
MCHC RBC AUTO-ENTMCNC: 32.2 G/DL (ref 33–36)
MCV RBC AUTO: 105.8 FL (ref 80–94)
MONOCYTES # BLD AUTO: 0.44 X10(3)/MCL (ref 0.1–1.3)
MONOCYTES NFR BLD AUTO: 5.1 %
NEUTROPHILS # BLD AUTO: 7.3 X10(3)/MCL (ref 2.1–9.2)
NEUTROPHILS NFR BLD AUTO: 84.2 %
NRBC BLD AUTO-RTO: 0 %
PLATELET # BLD AUTO: 220 X10(3)/MCL (ref 130–400)
PMV BLD AUTO: 9.2 FL (ref 7.4–10.4)
POTASSIUM SERPL-SCNC: 4.8 MMOL/L (ref 3.5–5.1)
PROT SERPL-MCNC: 7.5 GM/DL (ref 5.8–7.6)
PSA SERPL-MCNC: 0.79 NG/ML
RBC # BLD AUTO: 3.08 X10(6)/MCL (ref 4.7–6.1)
SODIUM SERPL-SCNC: 138 MMOL/L (ref 136–145)
WBC # BLD AUTO: 8.68 X10(3)/MCL (ref 4.5–11.5)

## 2024-06-20 PROCEDURE — 84153 ASSAY OF PSA TOTAL: CPT | Performed by: INTERNAL MEDICINE

## 2024-06-20 PROCEDURE — 80177 DRUG SCRN QUAN LEVETIRACETAM: CPT | Performed by: INTERNAL MEDICINE

## 2024-06-20 PROCEDURE — 85025 COMPLETE CBC W/AUTO DIFF WBC: CPT | Performed by: INTERNAL MEDICINE

## 2024-06-20 PROCEDURE — 80053 COMPREHEN METABOLIC PANEL: CPT | Performed by: INTERNAL MEDICINE

## 2024-06-22 LAB — LEVETIRACETAM SERPL-MCNC: 28.6 MCG/ML (ref 10–40)

## 2024-06-23 PROCEDURE — 81003 URINALYSIS AUTO W/O SCOPE: CPT | Performed by: INTERNAL MEDICINE

## 2024-06-23 PROCEDURE — 87086 URINE CULTURE/COLONY COUNT: CPT | Performed by: INTERNAL MEDICINE

## 2024-06-24 ENCOUNTER — LAB REQUISITION (OUTPATIENT)
Dept: LAB | Facility: HOSPITAL | Age: 79
End: 2024-06-24
Payer: MEDICARE

## 2024-06-24 DIAGNOSIS — F50.00 ANOREXIA NERVOSA, UNSPECIFIED: ICD-10-CM

## 2024-06-24 LAB
BACTERIA #/AREA URNS AUTO: ABNORMAL /HPF
BILIRUB UR QL STRIP.AUTO: NEGATIVE
CLARITY UR: ABNORMAL
COLOR UR AUTO: ABNORMAL
GLUCOSE UR QL STRIP: NEGATIVE
HGB UR QL STRIP: NEGATIVE
KETONES UR QL STRIP: NEGATIVE
LEUKOCYTE ESTERASE UR QL STRIP: ABNORMAL
NITRITE UR QL STRIP: NEGATIVE
PH UR STRIP: 7 [PH]
PROT UR QL STRIP: 100
RBC #/AREA URNS AUTO: ABNORMAL /HPF
SP GR UR STRIP.AUTO: 1.02 (ref 1–1.03)
SQUAMOUS #/AREA URNS AUTO: ABNORMAL /HPF
UROBILINOGEN UR STRIP-ACNC: 0.2
WBC #/AREA URNS AUTO: ABNORMAL /HPF

## 2024-06-26 LAB — BACTERIA UR CULT: NORMAL

## 2024-12-26 ENCOUNTER — TELEPHONE (OUTPATIENT)
Dept: CARDIOLOGY | Facility: HOSPITAL | Age: 79
End: 2024-12-26
Payer: MEDICARE

## 2024-12-26 RX ORDER — SODIUM CHLORIDE 0.9 % (FLUSH) 0.9 %
10 SYRINGE (ML) INJECTION
OUTPATIENT
Start: 2024-12-26

## 2024-12-26 NOTE — TELEPHONE ENCOUNTER
HD center called the office to report clotted access today. He has a left radiocephalic fistula. Last dialysis treatment was Monday, December 23, 2024 due to holiday schedule this week. He is normally TTS. Will draw labs on arrival. Nephrologist is Dr. Calderon from [place]. He is status post fistulogram on 9/28/23 with PTA to mid fistula s/t cannulation issues. We have not seen him for intervention this year. Scheduled him for tomorrow with nursing home. Instructions faxed to NH.    -ZT

## 2024-12-27 ENCOUNTER — HOSPITAL ENCOUNTER (OUTPATIENT)
Facility: HOSPITAL | Age: 79
Discharge: HOME | End: 2024-12-27
Attending: STUDENT IN AN ORGANIZED HEALTH CARE EDUCATION/TRAINING PROGRAM | Admitting: STUDENT IN AN ORGANIZED HEALTH CARE EDUCATION/TRAINING PROGRAM
Payer: MEDICARE

## 2024-12-27 VITALS
HEIGHT: 75 IN | TEMPERATURE: 98 F | WEIGHT: 174.63 LBS | HEART RATE: 77 BPM | BODY MASS INDEX: 21.71 KG/M2 | SYSTOLIC BLOOD PRESSURE: 120 MMHG | DIASTOLIC BLOOD PRESSURE: 67 MMHG | OXYGEN SATURATION: 97 %

## 2024-12-27 DIAGNOSIS — T82.868A THROMBOSIS OF KIDNEY DIALYSIS ARTERIOVENOUS GRAFT, INITIAL ENCOUNTER: ICD-10-CM

## 2024-12-27 LAB
ANION GAP SERPL CALC-SCNC: 14 MEQ/L
BUN SERPL-MCNC: 54.3 MG/DL (ref 8.4–25.7)
CALCIUM SERPL-MCNC: 9.3 MG/DL (ref 8.8–10)
CHLORIDE SERPL-SCNC: 100 MMOL/L (ref 98–107)
CO2 SERPL-SCNC: 26 MMOL/L (ref 23–31)
CREAT SERPL-MCNC: 9.68 MG/DL (ref 0.72–1.25)
CREAT/UREA NIT SERPL: 6
GFR SERPLBLD CREATININE-BSD FMLA CKD-EPI: 5 ML/MIN/1.73/M2
GLUCOSE SERPL-MCNC: 97 MG/DL (ref 82–115)
POTASSIUM SERPL-SCNC: 5.2 MMOL/L (ref 3.5–5.1)
SODIUM SERPL-SCNC: 140 MMOL/L (ref 136–145)

## 2024-12-27 PROCEDURE — 25500020 PHARM REV CODE 255: Performed by: STUDENT IN AN ORGANIZED HEALTH CARE EDUCATION/TRAINING PROGRAM

## 2024-12-27 PROCEDURE — 27201423 OPTIME MED/SURG SUP & DEVICES STERILE SUPPLY: Performed by: STUDENT IN AN ORGANIZED HEALTH CARE EDUCATION/TRAINING PROGRAM

## 2024-12-27 PROCEDURE — C1887 CATHETER, GUIDING: HCPCS | Performed by: STUDENT IN AN ORGANIZED HEALTH CARE EDUCATION/TRAINING PROGRAM

## 2024-12-27 PROCEDURE — C1725 CATH, TRANSLUMIN NON-LASER: HCPCS | Performed by: STUDENT IN AN ORGANIZED HEALTH CARE EDUCATION/TRAINING PROGRAM

## 2024-12-27 PROCEDURE — C1769 GUIDE WIRE: HCPCS | Performed by: STUDENT IN AN ORGANIZED HEALTH CARE EDUCATION/TRAINING PROGRAM

## 2024-12-27 PROCEDURE — 36906 THRMBC/NFS DIALYSIS CIRCUIT: CPT | Mod: LT | Performed by: STUDENT IN AN ORGANIZED HEALTH CARE EDUCATION/TRAINING PROGRAM

## 2024-12-27 PROCEDURE — C1874 STENT, COATED/COV W/DEL SYS: HCPCS | Performed by: STUDENT IN AN ORGANIZED HEALTH CARE EDUCATION/TRAINING PROGRAM

## 2024-12-27 PROCEDURE — 99153 MOD SED SAME PHYS/QHP EA: CPT | Performed by: STUDENT IN AN ORGANIZED HEALTH CARE EDUCATION/TRAINING PROGRAM

## 2024-12-27 PROCEDURE — 63600175 PHARM REV CODE 636 W HCPCS: Performed by: STUDENT IN AN ORGANIZED HEALTH CARE EDUCATION/TRAINING PROGRAM

## 2024-12-27 PROCEDURE — 99152 MOD SED SAME PHYS/QHP 5/>YRS: CPT | Mod: ,,, | Performed by: STUDENT IN AN ORGANIZED HEALTH CARE EDUCATION/TRAINING PROGRAM

## 2024-12-27 PROCEDURE — 80048 BASIC METABOLIC PNL TOTAL CA: CPT | Performed by: STUDENT IN AN ORGANIZED HEALTH CARE EDUCATION/TRAINING PROGRAM

## 2024-12-27 PROCEDURE — 99213 OFFICE O/P EST LOW 20 MIN: CPT | Mod: 25,,, | Performed by: STUDENT IN AN ORGANIZED HEALTH CARE EDUCATION/TRAINING PROGRAM

## 2024-12-27 PROCEDURE — C1894 INTRO/SHEATH, NON-LASER: HCPCS | Performed by: STUDENT IN AN ORGANIZED HEALTH CARE EDUCATION/TRAINING PROGRAM

## 2024-12-27 PROCEDURE — 36415 COLL VENOUS BLD VENIPUNCTURE: CPT | Performed by: STUDENT IN AN ORGANIZED HEALTH CARE EDUCATION/TRAINING PROGRAM

## 2024-12-27 PROCEDURE — 36906 THRMBC/NFS DIALYSIS CIRCUIT: CPT | Mod: LT,,, | Performed by: STUDENT IN AN ORGANIZED HEALTH CARE EDUCATION/TRAINING PROGRAM

## 2024-12-27 PROCEDURE — 99152 MOD SED SAME PHYS/QHP 5/>YRS: CPT | Performed by: STUDENT IN AN ORGANIZED HEALTH CARE EDUCATION/TRAINING PROGRAM

## 2024-12-27 PROCEDURE — 76937 US GUIDE VASCULAR ACCESS: CPT | Mod: 26,,, | Performed by: STUDENT IN AN ORGANIZED HEALTH CARE EDUCATION/TRAINING PROGRAM

## 2024-12-27 PROCEDURE — 76937 US GUIDE VASCULAR ACCESS: CPT | Performed by: STUDENT IN AN ORGANIZED HEALTH CARE EDUCATION/TRAINING PROGRAM

## 2024-12-27 PROCEDURE — C1757 CATH, THROMBECTOMY/EMBOLECT: HCPCS | Performed by: STUDENT IN AN ORGANIZED HEALTH CARE EDUCATION/TRAINING PROGRAM

## 2024-12-27 DEVICE — GORE VIABAHN ENDOPROSTHESIS 9MMX5CM 8FR 120CMCATH
Type: IMPLANTABLE DEVICE | Site: ARM | Status: FUNCTIONAL
Brand: GORE VIABAHN ENDOPROSTHESIS

## 2024-12-27 RX ORDER — HEPARIN SODIUM 1000 [USP'U]/ML
INJECTION, SOLUTION INTRAVENOUS; SUBCUTANEOUS
Status: DISCONTINUED | OUTPATIENT
Start: 2024-12-27 | End: 2024-12-27 | Stop reason: HOSPADM

## 2024-12-27 RX ORDER — CARVEDILOL 3.12 MG/1
3.12 TABLET ORAL 2 TIMES DAILY WITH MEALS
COMMUNITY

## 2024-12-27 RX ORDER — POLYETHYLENE GLYCOL 3350 17 G/17G
17 POWDER, FOR SOLUTION ORAL DAILY
COMMUNITY

## 2024-12-27 RX ORDER — FENTANYL CITRATE 50 UG/ML
INJECTION, SOLUTION INTRAMUSCULAR; INTRAVENOUS
Status: DISCONTINUED | OUTPATIENT
Start: 2024-12-27 | End: 2024-12-27 | Stop reason: HOSPADM

## 2024-12-27 RX ORDER — IOPAMIDOL 755 MG/ML
INJECTION, SOLUTION INTRAVASCULAR
Status: DISCONTINUED | OUTPATIENT
Start: 2024-12-27 | End: 2024-12-27 | Stop reason: HOSPADM

## 2024-12-27 RX ORDER — LIDOCAINE HYDROCHLORIDE 10 MG/ML
INJECTION, SOLUTION INFILTRATION; PERINEURAL
Status: DISCONTINUED | OUTPATIENT
Start: 2024-12-27 | End: 2024-12-27 | Stop reason: HOSPADM

## 2024-12-27 RX ORDER — MIDAZOLAM HYDROCHLORIDE 1 MG/ML
INJECTION INTRAMUSCULAR; INTRAVENOUS
Status: DISCONTINUED | OUTPATIENT
Start: 2024-12-27 | End: 2024-12-27 | Stop reason: HOSPADM

## 2024-12-27 RX ORDER — CINACALCET 30 MG/1
30 TABLET, FILM COATED ORAL EVERY OTHER DAY
COMMUNITY

## 2024-12-27 RX ORDER — ZINC GLUCONATE 50 MG
50 TABLET ORAL 2 TIMES DAILY
COMMUNITY

## 2024-12-27 RX ORDER — FOLIC ACID 0.8 MG
800 TABLET ORAL DAILY
COMMUNITY

## 2024-12-27 RX ORDER — SERTRALINE HYDROCHLORIDE 25 MG/1
25 TABLET, FILM COATED ORAL DAILY
COMMUNITY

## 2024-12-27 NOTE — H&P
INTERVENTIONAL NEPHROLOGY HISTORY & PHYSICAL       Patient Name: Angel Pablo Sr. MONTANO 1945    Date: 2024  Time: 7:28 AM         HPI: 79 y.o. male with ESRD on HD via left brachioaxillary arteriovenous graft who presents with thrombosis. Pt was known to us for having a L RC AVF that was last intervened on > year ago. It seems that within the time frame he underwent graft placement in the upper arm. Pt is being prepared for percutaneous thrombectomy today.    Pt seen and examined at bedside in CVSS this AM. Family is present. Risks and benefits of percutaneous thrombectomy and intravenous conscious sedation was reviewed with the patient. The patient agrees to proceed with the intended procedure. Consents for both intravenous conscious sedation and procedure were signed and placed within the chart.       Review of Systems:  General:  No fatigue  Skin: No rashes  HEENT: No vision changes  CVS: No CP  RS: No SOB  GIT: No abdominal pain  Extremities: No swelling  Neurological:  No focal weakness  Psych: No depression    Past Medical History:   Diagnosis Date    Benign prostatic hyperplasia with lower urinary tract symptoms     Carotid artery stenosis     Dementia     Dialysis patient     M/W/F    End stage renal disease     H/O prostate cancer     Hemiplegia and hemiparesis following cerebral infarction affecting left dominant side     Hyperlipidemia     Hypertension     Hypokalemia     Insomnia     Insomnia     Mechanical breakdown of surgically created arteriovenous fistula, subsequent encounter     Muscle wasting and atrophy, not elsewhere classified, multiple sites     Other lack of coordination     Pain in left knee     Paralytic gait     Presbyopia     Presence of intraocular lens     Repeated falls     Seizures     Stroke       Past Surgical History:   Procedure Laterality Date    COLONOSCOPY      EXCISION OF ARTERIOVENOUS FISTULA Left 2023    Procedure: EXCISION, AV FISTULA;  Surgeon:  Natalie Delatorre MD;  Location: [facility] OR;  Service: Peripheral Vascular;  Laterality: Left;  LEFT UPPER ARM FISTULA REVISION WITH TUNNELED CATHETER INSERTION // VASCULAR // SUPINE // SUPRACLAVICULAR BLOCK    FISTULOGRAM N/A 04/20/2023    Procedure: Fistulogram;  Surgeon: Akil Escobar DO;  Location: [facility] CATH LAB;  Service: Nephrology;  Laterality: N/A;  SANCHEZ FISTULOGRAM    FISTULOGRAM Left 08/22/2023    Dr. Escobar    FISTULOGRAM Left 8/22/2023    Procedure: Fistulogram;  Surgeon: Akil Escobar DO;  Location: [facility] CATH LAB;  Service: Nephrology;  Laterality: Left;    FISTULOGRAM Left 9/28/2023    Procedure: Fistulogram;  Surgeon: Akil Escobar DO;  Location: [facility] CATH LAB;  Service: Nephrology;  Laterality: Left;    INSERTION, VASCULAR ACCESS CATHETER N/A 05/03/2023    Procedure: INSERTION, VASCULAR ACCESS CATHETER;  Surgeon: Natalie Delatorre MD;  Location: [facility] OR;  Service: Peripheral Vascular;  Laterality: N/A;    KNEE SURGERY Left     REMOVAL OF TUNNELED CENTRAL VENOUS CATHETER (CVC) N/A 9/28/2023    Procedure: REMOVAL, CATHETER, CENTRAL VENOUS, TUNNELED;  Surgeon: Akil Escobar DO;  Location: [facility] CATH LAB;  Service: Nephrology;  Laterality: N/A;    REMOVAL OF TUNNELED CENTRAL VENOUS CATHETER (CVC) N/A 10/31/2023    Procedure: REMOVAL, CATHETER, CENTRAL VENOUS, TUNNELED;  Surgeon: Akil Escobar DO;  Location: [facility] CATH LAB;  Service: Nephrology;  Laterality: N/A;    TRANSPOSITION OF BASILIC VEIN Left 7/3/2023    Procedure: TRANSPOSITION, VEIN, BASILIC;  Surgeon: Natalie Delatorre MD;  Location: [facility] OR;  Service: Peripheral Vascular;  Laterality: Left;  left radiocephalic vs basilic fistula creation, supraclavicular block // XX      Review of patient's allergies indicates:  No Known Allergies   Social History     Tobacco Use    Smoking status: Never    Smokeless tobacco: Never   Substance Use Topics    Alcohol use: Not Currently    Drug use: Never      Family History   Family history unknown: Yes        No current facility-administered medications for this encounter.    Facility-Administered Medications Ordered in Other Encounters:     sodium chloride 0.9% flush 10 mL, 10 mL, Intravenous, PRN, Luz, Akil, DO    sodium chloride 0.9% flush 10 mL, 10 mL, Intravenous, PRN, Luz, Akil, DO    Vital Signs:  Temp:  [97.8 °F (36.6 °C)] 97.8 °F (36.6 °C)  Pulse:  [58] 58  SpO2:  [96 %] 96 %  BP: (123)/(71) 123/71     Physical Exam:  General: NAD  HEENT: NC/AT, EOMI  CVS: RRR.  RS: breathing easily.  Abdominal: Soft, NT/ND.  Extremities: No edema b/l LE  Skin: No rash, no lesions.  Neurological: No focal deficits.  Psych: Normal affect  Dialysis Access: left brachioaxillary arteriovenous graft without thrill.    Results:    Lab Results   Component Value Date     06/20/2024    K 4.8 06/20/2024     06/20/2024    CO2 28 06/20/2024    BUN 27.3 (H) 06/20/2024    CREATININE 6.45 (H) 06/20/2024     Lab Results   Component Value Date    WBC 8.68 06/20/2024    HGB 10.5 (L) 06/20/2024     06/20/2024    .8 (H) 06/20/2024       Assessment and Plan:      ESRD on HD via left brachioaxillary arteriovenous graft.  Thrombosis of AVG.  Pt with ESRD on HD via left brachioaxillary arteriovenous graft who presents today with graft thrombosis. The pt is being prepared for percutaneous thrombectomy today.  - Consents obtained and placed within chart.  - Will proceed in cath lab setting today.    Please feel free to reach me with any questions.    Akil Escobar DO  Interventional Nephrology  Cell: [phone]

## 2024-12-27 NOTE — PROCEDURES
INTERVENTIONAL NEPHROLOGY PROCEDURE NOTE: PERCUTANEOUS THROMBECTOMY       Patient Name: Angel Pablo Sr. MONTANO 1945    Procedure Date:    2024     Performing Physician:   Dr. Escobar    Access History: Pt is with ESRD on HD typically via left brachioaxillary arteriovenous graft who presents today with thrombosed HD access.    Pre-Op Diagnosis: T82.868A Thrombosis of vascular prosthetic devices, implants and grafts, initial encounter, N18.6 End Stage Renal Disease (ESRD).  Post-Op Diagnosis: Same    Procedure: Percutaneous thrombectomy of  left brachioaxillary arteriovenous graft.    Indication: Thrombosed HD access.    Informed Consent:  The patient was evaluated in the pre-operative area with assessment including the American Society of Anesthesia risk classification. The procedure is discussed in detail including risks, benefits alternatives and options and the patient agrees to proceed. Informed consent was obtained from the patient.     Maximum sterile barrier technique: The patient was prepped and draped using chlorhexidine prep and maximum sterile barrier technique.    Sedation Note:  Risks and benefits of sedation were reviewed with the patient or surrogate, including bleeding, infection, nausea, vomiting, dizziness, instability, damage to a nerve, damage to a blood vessel, cellulitis, reaction to medications, amnesia, loss of consciousness, respiratory arrest, cardiac arrest.     The patient received the following medications: Versed 1 mg IV and Fentanyl 50 mcg IV; patient did remain alert, responsive, and conversational throughout the procedure. I was personally responsible for supervising the administration of moderate sedation services during the procedure performed and I affirm all the guidelines and requirements described in the CPT 2024 section on moderate sedation were followed, including the use of an independent trained observer who had no other duties during the procedure.  Start sedation time was 0828 and stop time was 0914. The total face-to-face time was 46 minutes.    Procedure Steps:     The patient was prepped and draped in sterile fashion. Procedure ultrasound revealed thrombosed vascular access.     Our first portion of the procedure was dedicated to establishing an appropriate OUTFLOW segment:  Local anesthesia was administered by injecting 1% lidocaine at the intended site of cannulation. With use of live ultrasonographic visualization, the vascular access was successfully cannulated with an 18 gauge needle towards the venous end (image saved to chart). A 150 cm glide wire was placed into the access and advanced until tip was parked in the SVC. The needle was removed over wire and exchanged for a 7 Fr sheath.    A 45 cm 4 Fr angiocatheter was placed on the glide wire and advanced to the central vasculature. Central angiogram revealed patent superior vena cava, left innominate vein, and left subclavian vein. Pull-back angiogram was completed showing patent axillary vein, however clot burden identified at the venous anastomosis with an accompanying 80% stenosis. Angioplasty was completed here with a BD [place] 7 mm x 60 mm balloon, inflating to 22 GEORGE and achieving 100% effacement. Post-angioplasty angiogram revealed a residual 50% stenosis.    Pull-back angiogram was continued back to the sheath, now with 4 mg of tPA being injected at the beginning of thrombus formation. Five minutes of gentle massage throughout the access was completed.    Glidewire was re-introduced with tip parked in the central veins. Then a BD [place] 7 mm x 60 mm balloon was placed at the beginning of thrombus formation and mechanical thrombectomy by balloon maceration was underwent throughout the access down to the sheath. Multiple inflations were completed to 20-25 GEORGE for 1-5 seconds. The balloon was removed and clot aspiration was completed. Gentle angiogram was completed, revealing reduction in  thrombus within the access.     We repeated balloon maceration of thrombus and clot aspiration with appropriate reduction of thrombus within the access.    Back-bleeding was achieved.    We then focused our procedure on thrombectomizing the INFLOW segment:  Local anesthesia was administered by injecting 1% lidocaine at the intended site of cannulation. With use of live ultrasonographic visualization, a second cannulation was made with the needle pointing towards the arterial anastomosis (image saved to chart). The 18 gauge needle was exchanged for a 6 Fr sheath. A 40 cm Prisca catheter was introduced over glide wire and was passed into the feeding artery. The Prisca balloon was inflated and pulled back through the access to the sheath while clots were aspirated. This was completed multiple times. A thrill was appreciated throughout the access after completion. Multiple angiograms were completed using the Prisca catheter to ensure no residual thrombus was seen throughout the access.      Due to persistent stenosis, a stent-graft placement was pursued. The sheath was upgraded to an 8 Fr size. A [facility] 9 mm x 50 mm stent-graft was placed at the venous anastomosis. The stent-graft was posted with a BD [place] 7 mm x 60 mm balloon. Post-angioplasty angiogram revealed the stent missed target lesion. We therefore placed an additional Flared Covera 10 mm x 40 mm stent-graft at the distal end of the other stent-graft that was placed. It was posted with a BD [place] 9 mm x 40 mm balloon. Post-angioplasty angiogram revealed resolved stenosis.    Patency throughout the vascular access was confirmed with multiple angiograms. Wire and sheath(s) removed and hemostasis was achieved using a purse-string stitch and light digital pressure at the site(s) of cannulation.    ASSESSMENT/PLAN:  - Successful percutaneous thrombectomy with 2x stent-graft placements in the venous anastomosis.    EBL: 20 ml    Contrast: 45  ml    Additional meds: Heparin 4000 units IV.    Complications: None    Post-op Instructions: The patient was given both verbal and written post-op instructions. If excessive bleeding at the site, they have been instructed to call their physician or proceed to a local emergency room.    Orders to the dialysis unit: OK to use access for dialysis needs.    Thank you for allowing me the opportunity in taking care of this patient. Please reach me with any questions.    Akil Escobar DO  Interventional Nephrology  Cell: [phone]

## 2024-12-27 NOTE — Clinical Note
The balloon was inflated with indeflator in the  . The balloon max pressure was 8 kevin for 20 seconds

## 2024-12-27 NOTE — Clinical Note
A thrombectomy was performed Lesion documentation: multiple inflations performed for MECHANICAL THROMBECTOMY in the [facility] AV

## 2024-12-27 NOTE — Clinical Note
An angiogram was performed Lesion documentation: central veins were imaged. Thrombus starting in the LUE AVG

## 2024-12-27 NOTE — DISCHARGE SUMMARY
INTERVENTIONAL NEPHROLOGY DISCHARGE SUMMARY         Patient Name: Angel Pablo Sr. [place]O 1945    Procedure Date: 2024      In brief, Mr. Pablo underwent percutaneous thrombectomy of his L BA AVG. The graft was placed by Dr. Bates in September of this year. He had a right chest wall tunneled catheter removed by Dr. Graff recently. He was referred to us by his dialysis unit.     Key lesion was found to be at the venous anastomosis. We placed a stent in this tortuous outflow, however the first stent deployed in a fashion that missed the primary target. Therefore an additional stent-graft was placed for improved coverage.    I expect the graft to have improved function.    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    Discharge Instructions/Recommendations:  - Continue use of graft for HD.  - Pt may be discharged after successful monitoring in post-op area.  - Pt may resume home medications.    Thank you for allowing me the opportunity in taking care of this patient. Please reach me with any questions.    Akil Escobar DO  Interventional Nephrology  Cell: [phone]

## 2024-12-27 NOTE — Clinical Note
The balloon was inflated with indeflator in the  . The balloon max pressure was 20 kevin for 15 seconds

## 2024-12-27 NOTE — Clinical Note
45 ml of contrast were injected throughout the case. 35 mL of contrast was the total wasted during the case. 80 mL was the total amount used during the case.

## 2025-02-01 ENCOUNTER — HOSPITAL ENCOUNTER (INPATIENT)
Facility: HOSPITAL | Age: 80
LOS: 4 days | Discharge: HOME | DRG: 682 | End: 2025-02-05
Attending: EMERGENCY MEDICINE | Admitting: INTERNAL MEDICINE
Payer: MEDICARE

## 2025-02-01 DIAGNOSIS — D64.9 ANEMIA: ICD-10-CM

## 2025-02-01 DIAGNOSIS — D64.9 ACUTE ON CHRONIC ANEMIA: ICD-10-CM

## 2025-02-01 DIAGNOSIS — K92.2 GASTROINTESTINAL HEMORRHAGE, UNSPECIFIED GASTROINTESTINAL HEMORRHAGE TYPE: ICD-10-CM

## 2025-02-01 DIAGNOSIS — N18.6 ESRD (END STAGE RENAL DISEASE): Primary | ICD-10-CM

## 2025-02-01 LAB
ABO + RH BLD: NORMAL
ABORH RETYPE: NORMAL
ALBUMIN SERPL-MCNC: 2.9 G/DL (ref 3.4–4.8)
ALBUMIN SERPL-MCNC: 3 G/DL (ref 3.4–4.8)
ALBUMIN/GLOB SERPL: 0.7 RATIO (ref 1.1–2)
ALBUMIN/GLOB SERPL: 0.7 RATIO (ref 1.1–2)
ALP SERPL-CCNC: 75 UNIT/L (ref 40–150)
ALP SERPL-CCNC: 83 UNIT/L (ref 40–150)
ALT SERPL-CCNC: 15 UNIT/L (ref 0–55)
ALT SERPL-CCNC: 16 UNIT/L (ref 0–55)
ANION GAP SERPL CALC-SCNC: 11 MEQ/L
ANION GAP SERPL CALC-SCNC: 11 MEQ/L
APTT PPP: 30.7 SECONDS (ref 23.2–33.7)
AST SERPL-CCNC: 14 UNIT/L (ref 5–34)
AST SERPL-CCNC: 18 UNIT/L (ref 5–34)
BASOPHILS # BLD AUTO: 0.04 X10(3)/MCL
BASOPHILS # BLD AUTO: 0.04 X10(3)/MCL
BASOPHILS NFR BLD AUTO: 0.6 %
BASOPHILS NFR BLD AUTO: 0.6 %
BILIRUB SERPL-MCNC: 0.3 MG/DL
BILIRUB SERPL-MCNC: 0.4 MG/DL
BLD PROD TYP BPU: NORMAL
BLOOD UNIT EXPIRATION DATE: NORMAL
BLOOD UNIT TYPE CODE: 5100
BUN SERPL-MCNC: 28.7 MG/DL (ref 8.4–25.7)
BUN SERPL-MCNC: 30.1 MG/DL (ref 8.4–25.7)
CALCIUM SERPL-MCNC: 9.5 MG/DL (ref 8.8–10)
CALCIUM SERPL-MCNC: 9.9 MG/DL (ref 8.8–10)
CHLORIDE SERPL-SCNC: 97 MMOL/L (ref 98–107)
CHLORIDE SERPL-SCNC: 97 MMOL/L (ref 98–107)
CO2 SERPL-SCNC: 28 MMOL/L (ref 23–31)
CO2 SERPL-SCNC: 29 MMOL/L (ref 23–31)
CREAT SERPL-MCNC: 6.3 MG/DL (ref 0.72–1.25)
CREAT SERPL-MCNC: 6.38 MG/DL (ref 0.72–1.25)
CREAT/UREA NIT SERPL: 5
CREAT/UREA NIT SERPL: 5
CROSSMATCH INTERPRETATION: NORMAL
DISPENSE STATUS: NORMAL
EOSINOPHIL # BLD AUTO: 0.12 X10(3)/MCL (ref 0–0.9)
EOSINOPHIL # BLD AUTO: 0.16 X10(3)/MCL (ref 0–0.9)
EOSINOPHIL NFR BLD AUTO: 1.8 %
EOSINOPHIL NFR BLD AUTO: 2.2 %
ERYTHROCYTE [DISTWIDTH] IN BLOOD BY AUTOMATED COUNT: 17.5 % (ref 11.5–17)
ERYTHROCYTE [DISTWIDTH] IN BLOOD BY AUTOMATED COUNT: 18.1 % (ref 11.5–17)
FERRITIN SERPL-MCNC: 1769.37 NG/ML (ref 21.81–274.66)
GFR SERPLBLD CREATININE-BSD FMLA CKD-EPI: 8 ML/MIN/1.73/M2
GFR SERPLBLD CREATININE-BSD FMLA CKD-EPI: 8 ML/MIN/1.73/M2
GLOBULIN SER-MCNC: 4.3 GM/DL (ref 2.4–3.5)
GLOBULIN SER-MCNC: 4.5 GM/DL (ref 2.4–3.5)
GLUCOSE SERPL-MCNC: 104 MG/DL (ref 82–115)
GLUCOSE SERPL-MCNC: 89 MG/DL (ref 82–115)
GROUP & RH: NORMAL
HCT VFR BLD AUTO: 23.9 % (ref 42–52)
HCT VFR BLD AUTO: 26.9 % (ref 42–52)
HCT VFR BLD AUTO: 27.3 % (ref 42–52)
HGB BLD-MCNC: 7.3 G/DL (ref 14–18)
HGB BLD-MCNC: 8.6 G/DL (ref 14–18)
HGB BLD-MCNC: 8.7 G/DL (ref 14–18)
IMM GRANULOCYTES # BLD AUTO: 0.03 X10(3)/MCL (ref 0–0.04)
IMM GRANULOCYTES # BLD AUTO: 0.04 X10(3)/MCL (ref 0–0.04)
IMM GRANULOCYTES NFR BLD AUTO: 0.4 %
IMM GRANULOCYTES NFR BLD AUTO: 0.6 %
INDIRECT COOMBS: NORMAL
INR PPP: 1.1
IRON SATN MFR SERPL: 36 % (ref 20–50)
IRON SERPL-MCNC: 52 UG/DL (ref 65–175)
LYMPHOCYTES # BLD AUTO: 1.45 X10(3)/MCL (ref 0.6–4.6)
LYMPHOCYTES # BLD AUTO: 1.8 X10(3)/MCL (ref 0.6–4.6)
LYMPHOCYTES NFR BLD AUTO: 21.2 %
LYMPHOCYTES NFR BLD AUTO: 25.1 %
MCH RBC QN AUTO: 31.5 PG (ref 27–31)
MCH RBC QN AUTO: 31.6 PG (ref 27–31)
MCHC RBC AUTO-ENTMCNC: 30.5 G/DL (ref 33–36)
MCHC RBC AUTO-ENTMCNC: 31.9 G/DL (ref 33–36)
MCV RBC AUTO: 103.5 FL (ref 80–94)
MCV RBC AUTO: 98.9 FL (ref 80–94)
MONOCYTES # BLD AUTO: 0.66 X10(3)/MCL (ref 0.1–1.3)
MONOCYTES # BLD AUTO: 0.9 X10(3)/MCL (ref 0.1–1.3)
MONOCYTES NFR BLD AUTO: 12.5 %
MONOCYTES NFR BLD AUTO: 9.6 %
NEUTROPHILS # BLD AUTO: 4.24 X10(3)/MCL (ref 2.1–9.2)
NEUTROPHILS # BLD AUTO: 4.55 X10(3)/MCL (ref 2.1–9.2)
NEUTROPHILS NFR BLD AUTO: 59 %
NEUTROPHILS NFR BLD AUTO: 66.4 %
NRBC BLD AUTO-RTO: 0 %
NRBC BLD AUTO-RTO: 0.3 %
PLATELET # BLD AUTO: 204 X10(3)/MCL (ref 130–400)
PLATELET # BLD AUTO: 249 X10(3)/MCL (ref 130–400)
PMV BLD AUTO: 8.5 FL (ref 7.4–10.4)
PMV BLD AUTO: 8.8 FL (ref 7.4–10.4)
POTASSIUM SERPL-SCNC: 4.5 MMOL/L (ref 3.5–5.1)
POTASSIUM SERPL-SCNC: 4.9 MMOL/L (ref 3.5–5.1)
PROT SERPL-MCNC: 7.2 GM/DL (ref 5.8–7.6)
PROT SERPL-MCNC: 7.5 GM/DL (ref 5.8–7.6)
PROTHROMBIN TIME: 14.2 SECONDS (ref 12.5–14.5)
RBC # BLD AUTO: 2.31 X10(6)/MCL (ref 4.7–6.1)
RBC # BLD AUTO: 2.76 X10(6)/MCL (ref 4.7–6.1)
SODIUM SERPL-SCNC: 136 MMOL/L (ref 136–145)
SODIUM SERPL-SCNC: 137 MMOL/L (ref 136–145)
SPECIMEN OUTDATE: NORMAL
TIBC SERPL-MCNC: 146 UG/DL (ref 250–450)
TIBC SERPL-MCNC: 94 UG/DL (ref 60–240)
TRANSFERRIN SERPL-MCNC: 120 MG/DL (ref 163–344)
UNIT NUMBER: NORMAL
WBC # BLD AUTO: 6.85 X10(3)/MCL (ref 4.5–11.5)
WBC # BLD AUTO: 7.18 X10(3)/MCL (ref 4.5–11.5)

## 2025-02-01 PROCEDURE — 25000003 PHARM REV CODE 250: Performed by: EMERGENCY MEDICINE

## 2025-02-01 PROCEDURE — 30233N1 TRANSFUSION OF NONAUTOLOGOUS RED BLOOD CELLS INTO PERIPHERAL VEIN, PERCUTANEOUS APPROACH: ICD-10-PCS | Performed by: INTERNAL MEDICINE

## 2025-02-01 PROCEDURE — 11000001 HC ACUTE MED/SURG PRIVATE ROOM

## 2025-02-01 PROCEDURE — 86901 BLOOD TYPING SEROLOGIC RH(D): CPT | Performed by: EMERGENCY MEDICINE

## 2025-02-01 PROCEDURE — 63600175 PHARM REV CODE 636 W HCPCS: Performed by: EMERGENCY MEDICINE

## 2025-02-01 PROCEDURE — 85025 COMPLETE CBC W/AUTO DIFF WBC: CPT | Performed by: EMERGENCY MEDICINE

## 2025-02-01 PROCEDURE — 21400001 HC TELEMETRY ROOM

## 2025-02-01 PROCEDURE — 85014 HEMATOCRIT: CPT | Performed by: INTERNAL MEDICINE

## 2025-02-01 PROCEDURE — 36415 COLL VENOUS BLD VENIPUNCTURE: CPT | Performed by: INTERNAL MEDICINE

## 2025-02-01 PROCEDURE — 86923 COMPATIBILITY TEST ELECTRIC: CPT | Performed by: EMERGENCY MEDICINE

## 2025-02-01 PROCEDURE — 85730 THROMBOPLASTIN TIME PARTIAL: CPT | Performed by: EMERGENCY MEDICINE

## 2025-02-01 PROCEDURE — 80053 COMPREHEN METABOLIC PANEL: CPT | Performed by: INTERNAL MEDICINE

## 2025-02-01 PROCEDURE — 80053 COMPREHEN METABOLIC PANEL: CPT | Performed by: EMERGENCY MEDICINE

## 2025-02-01 PROCEDURE — P9016 RBC LEUKOCYTES REDUCED: HCPCS | Performed by: EMERGENCY MEDICINE

## 2025-02-01 PROCEDURE — 83550 IRON BINDING TEST: CPT | Performed by: INTERNAL MEDICINE

## 2025-02-01 PROCEDURE — 25000003 PHARM REV CODE 250: Performed by: INTERNAL MEDICINE

## 2025-02-01 PROCEDURE — 82728 ASSAY OF FERRITIN: CPT | Performed by: INTERNAL MEDICINE

## 2025-02-01 PROCEDURE — 99285 EMERGENCY DEPT VISIT HI MDM: CPT | Mod: 25

## 2025-02-01 PROCEDURE — 85610 PROTHROMBIN TIME: CPT | Performed by: EMERGENCY MEDICINE

## 2025-02-01 PROCEDURE — 85025 COMPLETE CBC W/AUTO DIFF WBC: CPT | Performed by: INTERNAL MEDICINE

## 2025-02-01 RX ORDER — HYDROCODONE BITARTRATE AND ACETAMINOPHEN 500; 5 MG/1; MG/1
TABLET ORAL
Status: DISCONTINUED | OUTPATIENT
Start: 2025-02-01 | End: 2025-02-05 | Stop reason: HOSPADM

## 2025-02-01 RX ORDER — ONDANSETRON HYDROCHLORIDE 2 MG/ML
4 INJECTION, SOLUTION INTRAVENOUS EVERY 8 HOURS PRN
Status: DISCONTINUED | OUTPATIENT
Start: 2025-02-01 | End: 2025-02-05 | Stop reason: HOSPADM

## 2025-02-01 RX ORDER — ADHESIVE BANDAGE
30 BANDAGE TOPICAL DAILY PRN
COMMUNITY

## 2025-02-01 RX ORDER — PANTOPRAZOLE SODIUM 40 MG/10ML
80 INJECTION, POWDER, LYOPHILIZED, FOR SOLUTION INTRAVENOUS
Status: COMPLETED | OUTPATIENT
Start: 2025-02-01 | End: 2025-02-01

## 2025-02-01 RX ORDER — SODIUM CHLORIDE 0.9 % (FLUSH) 0.9 %
10 SYRINGE (ML) INJECTION
Status: DISCONTINUED | OUTPATIENT
Start: 2025-02-01 | End: 2025-02-05 | Stop reason: HOSPADM

## 2025-02-01 RX ORDER — SERTRALINE HYDROCHLORIDE 25 MG/1
25 TABLET, FILM COATED ORAL DAILY
Status: DISCONTINUED | OUTPATIENT
Start: 2025-02-01 | End: 2025-02-05 | Stop reason: HOSPADM

## 2025-02-01 RX ORDER — TAMSULOSIN HYDROCHLORIDE 0.4 MG/1
0.8 CAPSULE ORAL NIGHTLY
Status: DISCONTINUED | OUTPATIENT
Start: 2025-02-01 | End: 2025-02-05 | Stop reason: HOSPADM

## 2025-02-01 RX ORDER — CARVEDILOL 3.12 MG/1
3.12 TABLET ORAL 2 TIMES DAILY WITH MEALS
Status: DISCONTINUED | OUTPATIENT
Start: 2025-02-01 | End: 2025-02-05 | Stop reason: HOSPADM

## 2025-02-01 RX ORDER — MEMANTINE HYDROCHLORIDE 5 MG/1
10 TABLET ORAL DAILY
Status: DISCONTINUED | OUTPATIENT
Start: 2025-02-01 | End: 2025-02-05 | Stop reason: HOSPADM

## 2025-02-01 RX ORDER — CINACALCET 30 MG/1
30 TABLET, FILM COATED ORAL EVERY OTHER DAY
Status: DISCONTINUED | OUTPATIENT
Start: 2025-02-02 | End: 2025-02-05 | Stop reason: HOSPADM

## 2025-02-01 RX ORDER — SODIUM CHLORIDE 9 MG/ML
INJECTION, SOLUTION INTRAVENOUS CONTINUOUS
Status: DISCONTINUED | OUTPATIENT
Start: 2025-02-01 | End: 2025-02-02

## 2025-02-01 RX ORDER — LEVETIRACETAM 500 MG/1
500 TABLET ORAL 2 TIMES DAILY
Status: DISCONTINUED | OUTPATIENT
Start: 2025-02-01 | End: 2025-02-05 | Stop reason: HOSPADM

## 2025-02-01 RX ORDER — TALC
6 POWDER (GRAM) TOPICAL NIGHTLY PRN
Status: DISCONTINUED | OUTPATIENT
Start: 2025-02-01 | End: 2025-02-05 | Stop reason: HOSPADM

## 2025-02-01 RX ORDER — ATORVASTATIN CALCIUM 40 MG/1
40 TABLET, FILM COATED ORAL NIGHTLY
Status: DISCONTINUED | OUTPATIENT
Start: 2025-02-01 | End: 2025-02-05 | Stop reason: HOSPADM

## 2025-02-01 RX ORDER — ACETAMINOPHEN 325 MG/1
650 TABLET ORAL EVERY 8 HOURS PRN
Status: DISCONTINUED | OUTPATIENT
Start: 2025-02-01 | End: 2025-02-05 | Stop reason: HOSPADM

## 2025-02-01 RX ADMIN — TRAZODONE HYDROCHLORIDE 25 MG: 50 TABLET ORAL at 08:02

## 2025-02-01 RX ADMIN — ATORVASTATIN CALCIUM 40 MG: 40 TABLET, FILM COATED ORAL at 08:02

## 2025-02-01 RX ADMIN — CARVEDILOL 3.12 MG: 3.12 TABLET, FILM COATED ORAL at 09:02

## 2025-02-01 RX ADMIN — PANTOPRAZOLE SODIUM 80 MG: 40 INJECTION, POWDER, LYOPHILIZED, FOR SOLUTION INTRAVENOUS at 01:02

## 2025-02-01 RX ADMIN — SERTRALINE HYDROCHLORIDE 25 MG: 25 TABLET ORAL at 09:02

## 2025-02-01 RX ADMIN — SODIUM CHLORIDE 8 MG/HR: 900 INJECTION INTRAVENOUS at 04:02

## 2025-02-01 RX ADMIN — SODIUM CHLORIDE 8 MG/HR: 900 INJECTION INTRAVENOUS at 07:02

## 2025-02-01 RX ADMIN — SODIUM CHLORIDE: 9 INJECTION, SOLUTION INTRAVENOUS at 01:02

## 2025-02-01 RX ADMIN — TAMSULOSIN HYDROCHLORIDE 0.8 MG: 0.4 CAPSULE ORAL at 08:02

## 2025-02-01 RX ADMIN — CARVEDILOL 3.12 MG: 3.12 TABLET, FILM COATED ORAL at 05:02

## 2025-02-01 RX ADMIN — LEVETIRACETAM 500 MG: 500 TABLET, FILM COATED ORAL at 08:02

## 2025-02-01 RX ADMIN — SODIUM CHLORIDE 8 MG/HR: 900 INJECTION INTRAVENOUS at 02:02

## 2025-02-01 RX ADMIN — SODIUM CHLORIDE 8 MG/HR: 900 INJECTION INTRAVENOUS at 09:02

## 2025-02-01 RX ADMIN — LEVETIRACETAM 500 MG: 500 TABLET, FILM COATED ORAL at 09:02

## 2025-02-01 RX ADMIN — MEMANTINE 10 MG: 5 TABLET ORAL at 09:02

## 2025-02-01 NOTE — ED PROVIDER NOTES
Encounter Date: 2/1/2025       History     Chief Complaint   Patient presents with    abnormal labs     Pt sent from [facility] rehab for low H/H  Pt Denies CP/SOB, weakness.   Hx: Stroke      79-year-old male with a history of ESRD on dialysis Monday Wednesday Friday presents ED for evaluation of abnormal labs.  He had labs performed yesterday with a low H&H.  He denies any complaints at this time.  He states he dialyze yesterday.        Review of patient's allergies indicates:  No Known Allergies  Past Medical History:   Diagnosis Date    Benign prostatic hyperplasia with lower urinary tract symptoms     Carotid artery stenosis     Dementia     Dialysis patient     M/W/F    End stage renal disease     H/O prostate cancer     Hemiplegia and hemiparesis following cerebral infarction affecting left dominant side     Hyperlipidemia     Hypertension     Hypokalemia     Insomnia     Insomnia     Mechanical breakdown of surgically created arteriovenous fistula, subsequent encounter     Muscle wasting and atrophy, not elsewhere classified, multiple sites     Other lack of coordination     Pain in left knee     Paralytic gait     Presbyopia     Presence of intraocular lens     Repeated falls     Seizures     Stroke      Past Surgical History:   Procedure Laterality Date    COLONOSCOPY      EXCISION OF ARTERIOVENOUS FISTULA Left 05/03/2023    Procedure: EXCISION, AV FISTULA;  Surgeon: Natalie Delatorre MD;  Location: [facility] OR;  Service: Peripheral Vascular;  Laterality: Left;  LEFT UPPER ARM FISTULA REVISION WITH TUNNELED CATHETER INSERTION // VASCULAR // SUPINE // SUPRACLAVICULAR BLOCK    FISTULOGRAM N/A 04/20/2023    Procedure: Fistulogram;  Surgeon: Akil Escobar DO;  Location: [facility] CATH LAB;  Service: Nephrology;  Laterality: N/A;  LUE FISTULOGRAM    FISTULOGRAM Left 08/22/2023    Dr. Escobar    FISTULOGRAM Left 8/22/2023    Procedure: Fistulogram;  Surgeon: Akil Escobar DO;  Location: [facility] CATH LAB;  Service:  Nephrology;  Laterality: Left;    FISTULOGRAM Left 9/28/2023    Procedure: Fistulogram;  Surgeon: Akil Escobar DO;  Location: [facility] CATH LAB;  Service: Nephrology;  Laterality: Left;    INSERTION, VASCULAR ACCESS CATHETER N/A 05/03/2023    Procedure: INSERTION, VASCULAR ACCESS CATHETER;  Surgeon: Natalie Delatorre MD;  Location: [facility];  Service: Peripheral Vascular;  Laterality: N/A;    KNEE SURGERY Left     REMOVAL OF TUNNELED CENTRAL VENOUS CATHETER (CVC) N/A 9/28/2023    Procedure: REMOVAL, CATHETER, CENTRAL VENOUS, TUNNELED;  Surgeon: Akil Escobar DO;  Location: [facility] CATH LAB;  Service: Nephrology;  Laterality: N/A;    REMOVAL OF TUNNELED CENTRAL VENOUS CATHETER (CVC) N/A 10/31/2023    Procedure: REMOVAL, CATHETER, CENTRAL VENOUS, TUNNELED;  Surgeon: Akil Escobar DO;  Location: [facility] CATH LAB;  Service: Nephrology;  Laterality: N/A;    THROMBECTOMY Left 12/27/2024    Procedure: THROMBECTOMY;  Surgeon: Akil Escobar DO;  Location: [facility] CATH LAB;  Service: Nephrology;  Laterality: Left;    TRANSPOSITION OF BASILIC VEIN Left 7/3/2023    Procedure: TRANSPOSITION, VEIN, BASILIC;  Surgeon: Natalie Delatorre MD;  Location: [facility];  Service: Peripheral Vascular;  Laterality: Left;  left radiocephalic vs basilic fistula creation, supraclavicular block // XX     Family History   Family history unknown: Yes     Social History     Tobacco Use    Smoking status: Never    Smokeless tobacco: Never   Substance Use Topics    Alcohol use: Not Currently    Drug use: Never     Review of Systems   All other systems reviewed and are negative.      Physical Exam     Initial Vitals [02/01/25 0031]   BP Pulse Resp Temp SpO2   (!) 92/56 70 18 97.5 °F (36.4 °C) 98 %      MAP       --         Physical Exam    Nursing note and vitals reviewed.  Constitutional: He appears well-developed and well-nourished. He is not diaphoretic. No distress.   HENT:   Head: Normocephalic and atraumatic.   Nose: Nose normal. Mouth/Throat:  Oropharynx is clear and moist.   Eyes: Conjunctivae and EOM are normal. Pupils are equal, round, and reactive to light.   Neck: Trachea normal. Neck supple.   Normal range of motion.  Cardiovascular:  Normal rate, regular rhythm and intact distal pulses.     Exam reveals no gallop and no friction rub.       No murmur heard.  Left upper extremity access to palpable thrill systolic murmur   Pulmonary/Chest: Breath sounds normal. No respiratory distress. He has no wheezes. He has no rhonchi. He has no rales. He exhibits no tenderness.   Abdominal: Abdomen is soft. Bowel sounds are normal. He exhibits no distension and no mass. There is no abdominal tenderness. There is no rebound.   Genitourinary:    Genitourinary Comments: Loose dark brown stool guaiac positive     Musculoskeletal:         General: No tenderness or edema. Normal range of motion.      Cervical back: Normal range of motion and neck supple.      Lumbar back: Normal. No tenderness. Normal range of motion.     Neurological: He is alert and oriented to person, place, and time. No cranial nerve deficit or sensory deficit.   Generalized weakness   Skin: Skin is warm and dry. Capillary refill takes less than 2 seconds. No rash and no abscess noted. No erythema. No pallor.   Psychiatric: He has a normal mood and affect. His behavior is normal. Judgment and thought content normal.         ED Course   Critical Care    Date/Time: 2/1/2025 2:30 AM    Performed by: Marilee Calvin MD  Authorized by: Marilee Calvin MD  Direct patient critical care time: 31 minutes  Total critical care time (exclusive of procedural time) : 31 minutes  Critical care was necessary to treat or prevent imminent or life-threatening deterioration of the following conditions: anemia.  Critical care was time spent personally by me on the following activities: development of treatment plan with patient or surrogate, discussions with consultants, evaluation of patient's response to  treatment, obtaining history from patient or surrogate, examination of patient, ordering and performing treatments and interventions, ordering and review of laboratory studies, pulse oximetry, re-evaluation of patient's condition and review of old charts.        Labs Reviewed   COMPREHENSIVE METABOLIC PANEL - Abnormal       Result Value    Sodium 137      Potassium 4.5      Chloride 97 (*)     CO2 29      Glucose 104      Blood Urea Nitrogen 28.7 (*)     Creatinine 6.30 (*)     Calcium 9.9      Protein Total 7.5      Albumin 3.0 (*)     Globulin 4.5 (*)     Albumin/Globulin Ratio 0.7 (*)     Bilirubin Total 0.3      ALP 83      ALT 15      AST 14      eGFR 8      Anion Gap 11.0      BUN/Creatinine Ratio 5     CBC WITH DIFFERENTIAL - Abnormal    WBC 6.85      RBC 2.31 (*)     Hgb 7.3 (*)     Hct 23.9 (*)     .5 (*)     MCH 31.6 (*)     MCHC 30.5 (*)     RDW 17.5 (*)     Platelet 249      MPV 8.5      Neut % 66.4      Lymph % 21.2      Mono % 9.6      Eos % 1.8      Basophil % 0.6      Imm Grans % 0.4      Neut # 4.55      Lymph # 1.45      Mono # 0.66      Eos # 0.12      Baso # 0.04      Imm Gran # 0.03      NRBC% 0.0     PROTIME-INR - Normal    PT 14.2      INR 1.1     APTT - Normal    PTT 30.7     CBC W/ AUTO DIFFERENTIAL    Narrative:     The following orders were created for panel order CBC auto differential.  Procedure                               Abnormality         Status                     ---------                               -----------         ------                     CBC with Differential[[id number]]       Abnormal            Final result                 Please view results for these tests on the individual orders.   TYPE & SCREEN    Group & Rh O POS      Indirect Jose GEL NEG      Specimen Outdate 02/04/2025 23:59     ABORH RETYPE    ABORH Retype O POS     PREPARE RBC SOFT    UNIT NUMBER [id number]      UNIT ABO/RH O POS      DISPENSE STATUS Issued      Unit Expiration [id number]       Product Code [id number]      Unit Blood Type Code 5100      CROSSMATCH INTERPRETATION Compatible            Imaging Results    None          Medications   pantoprazole (PROTONIX) 40 mg in 0.9% NaCl 100 mL IVPB (MB+) (has no administration in time range)   0.9%  NaCl infusion (for blood administration) (has no administration in time range)   pantoprazole injection 80 mg (80 mg Intravenous Given 2/1/25 0123)     Medical Decision Making  Given patient's presentation, differential diagnosis includes but is not limited to lab error, anemia of chronic disease, acute anemia, gi bleed, electrolyte derangemetn  To evaluate these  possible etiologies cbc, cmp, coags, type and screen were ordered and reviewed  Acut katarina chronic anemia, esrd noted  Protonix, prbc, admit    Problems Addressed:  Acute on chronic anemia: acute illness or injury that poses a threat to life or bodily functions  Anemia: acute illness or injury that poses a threat to life or bodily functions  ESRD (end stage renal disease): chronic illness or injury  Gastrointestinal hemorrhage, unspecified gastrointestinal hemorrhage type: acute illness or injury that poses a threat to life or bodily functions    Amount and/or Complexity of Data Reviewed  External Data Reviewed: notes.     Details: Anemia, esrd  Labs: ordered.    Risk  OTC drugs.  Prescription drug management.  Decision regarding hospitalization.    Critical Care  Total time providing critical care: 30 minutes               ED Course as of 02/01/25 0333   Sat Feb 01, 2025   0212 Hospitalist consulted [BS]      ED Course User Index  [BS] Marilee Calvin MD                           Clinical Impression:  Final diagnoses:  [D64.9] Anemia  [N18.6] ESRD (end stage renal disease) (Primary)  [K92.2] Gastrointestinal hemorrhage, unspecified gastrointestinal hemorrhage type  [D64.9] Acute on chronic anemia          ED Disposition Condition    Admit Stable                Marilee Calvin MD  02/01/25  [id number]

## 2025-02-01 NOTE — H&P
[facility]  [facility]  [facility] MEDICINE - H&P ADMISSION NOTE      Patient Name: Angel Pablo Sr.  MRN: [id number]  Patient Class: IP- Inpatient   Admission Date: 2/1/2025   Admitting Physician: MARIA LUISA Service   Attending Physician: Bob Prater MD  Primary Care Provider: Prieto Vale MD  Face-to-Face encounter date: 02/01/2025        CHIEF COMPLAINT     Chief Complaint   Patient presents with    abnormal labs     Pt sent from [facility] rehab for low H/H  Pt Denies CP/SOB, weakness.   Hx: Stroke        HISTORY OF PRESENTING ILLNESS   79 year old male with pmh of cva, esrd hd, dementia, hld, htn, parkinson.  He was referrred to the ED due to abnormal labs which showed signicant anemia hg of 7.3.   Not a great historian to be able to get information from.    Denies cp sob abd pain fevers        PAST MEDICAL HISTORY     Past Medical History:   Diagnosis Date    Benign prostatic hyperplasia with lower urinary tract symptoms     Carotid artery stenosis     Dementia     Dialysis patient     M/W/F    End stage renal disease     H/O prostate cancer     Hemiplegia and hemiparesis following cerebral infarction affecting left dominant side     Hyperlipidemia     Hypertension     Hypokalemia     Insomnia     Insomnia     Mechanical breakdown of surgically created arteriovenous fistula, subsequent encounter     Muscle wasting and atrophy, not elsewhere classified, multiple sites     Other lack of coordination     Pain in left knee     Paralytic gait     Presbyopia     Presence of intraocular lens     Repeated falls     Seizures     Stroke        PAST SURGICAL HISTORY     Past Surgical History:   Procedure Laterality Date    COLONOSCOPY      EXCISION OF ARTERIOVENOUS FISTULA Left 05/03/2023    Procedure: EXCISION, AV FISTULA;  Surgeon: Natalie Delatorre MD;  Location: [facility];  Service: Peripheral Vascular;  Laterality: Left;  LEFT UPPER ARM FISTULA REVISION WITH TUNNELED CATHETER INSERTION //  VASCULAR // SUPINE // SUPRACLAVICULAR BLOCK    FISTULOGRAM N/A 04/20/2023    Procedure: Fistulogram;  Surgeon: Akil Escobar DO;  Location: [facility] CATH LAB;  Service: Nephrology;  Laterality: N/A;  LUE FISTULOGRAM    FISTULOGRAM Left 08/22/2023    Dr. Escobar    FISTULOGRAM Left 8/22/2023    Procedure: Fistulogram;  Surgeon: Akil Escobar DO;  Location: [facility] CATH LAB;  Service: Nephrology;  Laterality: Left;    FISTULOGRAM Left 9/28/2023    Procedure: Fistulogram;  Surgeon: Akil Escobar DO;  Location: [facility] CATH LAB;  Service: Nephrology;  Laterality: Left;    INSERTION, VASCULAR ACCESS CATHETER N/A 05/03/2023    Procedure: INSERTION, VASCULAR ACCESS CATHETER;  Surgeon: Natalie Delatorre MD;  Location: [facility] OR;  Service: Peripheral Vascular;  Laterality: N/A;    KNEE SURGERY Left     REMOVAL OF TUNNELED CENTRAL VENOUS CATHETER (CVC) N/A 9/28/2023    Procedure: REMOVAL, CATHETER, CENTRAL VENOUS, TUNNELED;  Surgeon: Akil Escobar DO;  Location: [facility] CATH LAB;  Service: Nephrology;  Laterality: N/A;    REMOVAL OF TUNNELED CENTRAL VENOUS CATHETER (CVC) N/A 10/31/2023    Procedure: REMOVAL, CATHETER, CENTRAL VENOUS, TUNNELED;  Surgeon: Akil Escobar DO;  Location: [facility] CATH LAB;  Service: Nephrology;  Laterality: N/A;    THROMBECTOMY Left 12/27/2024    Procedure: THROMBECTOMY;  Surgeon: Akil Escobar DO;  Location: [facility] CATH LAB;  Service: Nephrology;  Laterality: Left;    TRANSPOSITION OF BASILIC VEIN Left 7/3/2023    Procedure: TRANSPOSITION, VEIN, BASILIC;  Surgeon: Natalie Delatorre MD;  Location: [facility] OR;  Service: Peripheral Vascular;  Laterality: Left;  left radiocephalic vs basilic fistula creation, supraclavicular block // XX       FAMILY HISTORY   Reviewed and noncontributory to this case    SOCIAL HISTORY     Social History     Socioeconomic History    Marital status: Single   Tobacco Use    Smoking status: Never    Smokeless tobacco: Never   Substance and Sexual Activity    Alcohol use: Not Currently     Drug use: Never    Sexual activity: Not Currently   Social History Narrative    ** Merged History Encounter **          Screening for Social Drivers for health:  Patient screened for food insecurity, housing instability, transportation needs, utility difficulties, and interpersonal safety (select all that apply as identified as concern)  []Housing or Food  []Transportation Needs  []Utility Difficulties  []Interpersonal safety  [x]None      HOME MEDICATIONS     Prior to Admission medications    Medication Sig Start Date End Date Taking? Authorizing Provider   acetaminophen (TYLENOL) 650 MG TbSR Take 650 mg by mouth every 4 (four) hours as needed.    Provider, Historical   ascorbic acid, vitamin C, (VITAMIN C) 1000 MG tablet Take 1,000 mg by mouth once daily.    Provider, Historical   aspirin (ECOTRIN) 81 MG EC tablet Take 81 mg by mouth once daily.    Provider, Historical   atorvastatin (LIPITOR) 40 MG tablet Take 40 mg by mouth nightly.    Provider, Historical   B complex-vitamin C-folic acid (MARIE-JAJA/NEPHRO-JAJA) 0.8 mg Tab Take 1 tablet by mouth once daily.    Provider, Historical   carvediloL (COREG) 3.125 MG tablet Take 3.125 mg by mouth 2 (two) times daily with meals.    Provider, Historical   cinacalcet (SENSIPAR) 30 MG Tab Take 30 mg by mouth every other day.    Provider, Historical   folic acid (FOLVITE) 800 MCG Tab Take 800 mcg by mouth once daily.    Provider, Historical   levETIRAcetam (KEPPRA) 500 MG Tab Take 500 mg by mouth 2 (two) times daily.    Provider, Historical   melatonin (MELATIN) 5 mg Take 10 mg by mouth every evening.    Provider, Historical   memantine (NAMENDA) 10 MG Tab Take 10 mg by mouth 2 (two) times daily.    Provider, Historical   mirabegron (MYRBETRIQ) 50 mg Tb24 Take 1 tablet by mouth once daily.    Provider, Historical   ondansetron (ZOFRAN-ODT) 4 MG TbDL Take 4 mg by mouth every 6 (six) hours as needed.    Provider, Historical   polyethylene glycol (GLYCOLAX) 17 gram PwPk Take  17 g by mouth once daily.    Provider, Historical   potassium chloride (KLOR-CON) 10 MEQ TbSR Take 10 mEq by mouth once. Every Monday, Tuesday, Wednesday, Thursday, Friday    Provider, Historical   sertraline (ZOLOFT) 25 MG tablet Take 25 mg by mouth once daily.    Provider, Historical   tamsulosin (FLOMAX) 0.4 mg Cap Take 0.8 mg by mouth every evening.    Provider, Historical   trazodone (DESYREL) 25 MG tablet Take 25 mg by mouth every evening.    Provider, Historical   zinc gluconate 50 mg tablet Take 50 mg by mouth 2 (two) times a day.    Provider, Historical       ALLERGIES   Patient has no known allergies.          REVIEW OF SYSTEMS   Except as documented above, all other systems reviewed and negative    PHYSICAL EXAM     Vitals:    02/01/25 0701   BP: 117/60   Pulse: 73   Resp: 16   Temp:       General:  weak appearing  Head and neck:  Atraumatic, normocephalic, moist mucous membranes, supple neck  Chest:  Clear to auscultation bilaterally  Heart:  S1, S2, no appreciable murmur  Abdomen:  Soft, nontender, BS +  MSK:  Warm, no lower extremity edema, no clubbing or cyanosis  Neuro:    Integumentary:  No obvious skin rash  Psychiatry:            ASSESSMENT AND PLAN   FOBT +  Acute on chronic anemia requiring blood transfusion  Esrd hd     History of: as above      Nephrology consult for HD needs.  S/p blood in the er, follow h/h  Gi consult.  Continue protonix drip    DVT prophylaxis:    __________________________________________________________________  LABS/MICRO/MEDS/DIAGNOSTICS       LABS  Recent Labs     02/01/25  0619      K 4.9   CO2 28   BUN 30.1*   CREATININE 6.38*   GLUCOSE 89   CALCIUM 9.5   ALKPHOS 75   AST 18   ALT 16   ALBUMIN 2.9*     Recent Labs     02/01/25  0619   WBC 7.18   RBC 2.76*   HCT 27.3*   MCV 98.9*          MICROBIOLOGY  Microbiology Results (last 7 days)       ** No results found for the last 168 hours. **            MEDICATIONS     INFUSIONS   pantoprazole (PROTONIX) IV  infusion  8 mg/hr Intravenous Continuous 20 mL/hr at 02/01/25 0620 8 mg/hr at 02/01/25 0620       DIAGNOSTIC TESTS  No orders to display          Patient information was obtained from patient, patient's family, past medical records and ER records.   All diagnosis and differential diagnosis have been reviewed; assessment and plan has been documented. I have personally reviewed the labs and test results that are presently available; I have reviewed the patients medication list. I have reviewed the consulting providers response and recommendations. I have reviewed or attempted to review medical records based upon their availability.  All of the patient's questions have been addressed and answered. Patient's is agreeable to the above stated plan. I will continue to monitor closely and make adjustments to medical management as needed.  This note was created using [employer] voice recognition software that occasionally misinterpreted phrases or words.  Please contact me if any questions may rise regarding documentation to clarify verbiage.        Bob Prater MD   Internal Medicine  Department of Hospital Medicine  [facility] - Emergency Dept

## 2025-02-01 NOTE — CONSULTS
Nephrology Consult  [facility]      Patient Name: Angel Pablo Sr.  Age: 79 y.o.  : 1945  MRN: [id number]  Admission Date: 2025      Reason for Consult:     Management of ESRD    HPI:  Angel Pablo Sr. is a 79 y.o. male with history of ESRD who dialyzes on MWF schedule at [facility] in [place] under the care of Dr. Agosto via left upper arm fistula.  He was sent to the ED for evaluation after being found to have low hemoglobin on outpatient labs.  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  GI was consulted for further eval of GI bleed.  He was transfused 1 unit PRBCs.  Awaiting GI consult.  We are consulted for management of ESRD.    PCP:  Prieto Vale MD    Review of patient's allergies indicates:  No Known Allergies    Current Facility-Administered Medications   Medication Dose Route Frequency Provider Last Rate Last Admin    0.9%  NaCl infusion (for blood administration)   Intravenous Q24H PRN Marilee Calvin MD        acetaminophen tablet 650 mg  650 mg Oral Q8H PRN Fareed Beasley MD        melatonin tablet 6 mg  6 mg Oral Nightly PRN Fareed Beasley MD        ondansetron injection 4 mg  4 mg Intravenous Q8H PRN Fareed Beasley MD        pantoprazole (PROTONIX) 40 mg in 0.9% NaCl 100 mL IVPB (MB+)  8 mg/hr Intravenous Continuous Marilee Calvin MD 20 mL/hr at 25 0620 8 mg/hr at 25 0620    sodium chloride 0.9% flush 10 mL  10 mL Intravenous PRN Fareed Beasley MD         Current Outpatient Medications   Medication Sig Dispense Refill    acetaminophen (TYLENOL) 650 MG TbSR Take 650 mg by mouth every 4 (four) hours as needed.      ascorbic acid, vitamin C, (VITAMIN C) 1000 MG tablet Take 1,000 mg by mouth once daily.      aspirin (ECOTRIN) 81  MG EC tablet Take 81 mg by mouth once daily.      atorvastatin (LIPITOR) 40 MG tablet Take 40 mg by mouth nightly.      B complex-vitamin C-folic acid (MARIE-JAJA/NEPHRO-JAJA) 0.8 mg Tab Take 1 tablet by mouth once daily.      carvediloL (COREG) 3.125 MG tablet Take 3.125 mg by mouth 2 (two) times daily with meals.      cinacalcet (SENSIPAR) 30 MG Tab Take 30 mg by mouth every other day.      folic acid (FOLVITE) 800 MCG Tab Take 800 mcg by mouth once daily.      levETIRAcetam (KEPPRA) 500 MG Tab Take 500 mg by mouth 2 (two) times daily.      melatonin (MELATIN) 5 mg Take 10 mg by mouth every evening.      memantine (NAMENDA) 10 MG Tab Take 10 mg by mouth 2 (two) times daily.      mirabegron (MYRBETRIQ) 50 mg Tb24 Take 1 tablet by mouth once daily.      ondansetron (ZOFRAN-ODT) 4 MG TbDL Take 4 mg by mouth every 6 (six) hours as needed.      polyethylene glycol (GLYCOLAX) 17 gram PwPk Take 17 g by mouth once daily.      potassium chloride (KLOR-CON) 10 MEQ TbSR Take 10 mEq by mouth once. Every Monday, Tuesday, Wednesday, Thursday, Friday      sertraline (ZOLOFT) 25 MG tablet Take 25 mg by mouth once daily.      tamsulosin (FLOMAX) 0.4 mg Cap Take 0.8 mg by mouth every evening.      trazodone (DESYREL) 25 MG tablet Take 25 mg by mouth every evening.      zinc gluconate 50 mg tablet Take 50 mg by mouth 2 (two) times a day.       Facility-Administered Medications Ordered in Other Encounters   Medication Dose Route Frequency Provider Last Rate Last Admin    sodium chloride 0.9% flush 10 mL  10 mL Intravenous PRN Hasan, Akil, DO        sodium chloride 0.9% flush 10 mL  10 mL Intravenous PRN Hasan, Akil, DO         (Not in a hospital admission)      Past Medical History:  Past Medical History:   Diagnosis Date    Benign prostatic hyperplasia with lower urinary tract symptoms     Carotid artery stenosis     Dementia     Dialysis patient     M/W/F    End stage renal disease     H/O prostate cancer     Hemiplegia and  hemiparesis following cerebral infarction affecting left dominant side     Hyperlipidemia     Hypertension     Hypokalemia     Insomnia     Insomnia     Mechanical breakdown of surgically created arteriovenous fistula, subsequent encounter     Muscle wasting and atrophy, not elsewhere classified, multiple sites     Other lack of coordination     Pain in left knee     Paralytic gait     Presbyopia     Presence of intraocular lens     Repeated falls     Seizures     Stroke        Past Surgical History:  Past Surgical History:   Procedure Laterality Date    COLONOSCOPY      EXCISION OF ARTERIOVENOUS FISTULA Left 05/03/2023    Procedure: EXCISION, AV FISTULA;  Surgeon: Natalie Delatorre MD;  Location: [facility];  Service: Peripheral Vascular;  Laterality: Left;  LEFT UPPER ARM FISTULA REVISION WITH TUNNELED CATHETER INSERTION // VASCULAR // SUPINE // SUPRACLAVICULAR BLOCK    FISTULOGRAM N/A 04/20/2023    Procedure: Fistulogram;  Surgeon: Akil Escobar DO;  Location: [facility] CATH LAB;  Service: Nephrology;  Laterality: N/A;  LUE FISTULOGRAM    FISTULOGRAM Left 08/22/2023    Dr. Escobar    FISTULOGRAM Left 8/22/2023    Procedure: Fistulogram;  Surgeon: Akil Escobar DO;  Location: [facility] CATH LAB;  Service: Nephrology;  Laterality: Left;    FISTULOGRAM Left 9/28/2023    Procedure: Fistulogram;  Surgeon: Akil Escobar DO;  Location: [facility] CATH LAB;  Service: Nephrology;  Laterality: Left;    INSERTION, VASCULAR ACCESS CATHETER N/A 05/03/2023    Procedure: INSERTION, VASCULAR ACCESS CATHETER;  Surgeon: Natalie Delatorre MD;  Location: [facility] OR;  Service: Peripheral Vascular;  Laterality: N/A;    KNEE SURGERY Left     REMOVAL OF TUNNELED CENTRAL VENOUS CATHETER (CVC) N/A 9/28/2023    Procedure: REMOVAL, CATHETER, CENTRAL VENOUS, TUNNELED;  Surgeon: Akil Escobar DO;  Location: [facility] CATH LAB;  Service: Nephrology;  Laterality: N/A;    REMOVAL OF TUNNELED CENTRAL VENOUS CATHETER (CVC) N/A 10/31/2023    Procedure: REMOVAL,  CATHETER, CENTRAL VENOUS, TUNNELED;  Surgeon: Akil Escobar DO;  Location: [facility] CATH LAB;  Service: Nephrology;  Laterality: N/A;    THROMBECTOMY Left 12/27/2024    Procedure: THROMBECTOMY;  Surgeon: Akil Escobar DO;  Location: [facility] CATH LAB;  Service: Nephrology;  Laterality: Left;    TRANSPOSITION OF BASILIC VEIN Left 7/3/2023    Procedure: TRANSPOSITION, VEIN, BASILIC;  Surgeon: Natalie Delatorre MD;  Location: [facility] OR;  Service: Peripheral Vascular;  Laterality: Left;  left radiocephalic vs basilic fistula creation, supraclavicular block // XX       Family History:  Family History   Family history unknown: Yes       Social History:  Social History     Tobacco Use    Smoking status: Never    Smokeless tobacco: Never   Substance Use Topics    Alcohol use: Not Currently          Review of Systems:    Review of Systems   Eyes:  Negative for pain and discharge.   Respiratory: Negative.     Cardiovascular: Negative.  Negative for chest pain and leg swelling.   Gastrointestinal:  Negative for nausea and vomiting.   Genitourinary:  Positive for difficulty urinating.   Neurological:  Positive for speech difficulty and weakness.   Hematological:  Negative for adenopathy. Does not bruise/bleed easily.   Psychiatric/Behavioral: Negative.         Objective:    VITAL SIGNS: 24 HR MIN & MAX LAST  Temp  Min: 97.5 °F (36.4 °C)  Max: 97.9 °F (36.6 °C) 97.9 °F (36.6 °C)  BP  Min: 92/56  Max: 117/60 117/60  Pulse  Min: 66  Max: 75 73  Resp  Min: 16  Max: 25 16  SpO2  Min: 95 %  Max: 100 % 99 %      Intake/Output Summary (Last 24 hours) at 2/1/2025 0736  Last data filed at 2/1/2025 0620  Gross per 24 hour   Intake 33.25 ml   Output --   Net 33.25 ml       Physical Exam  Constitutional:       Appearance: He is ill-appearing.      Comments: Extremely thin   HENT:      Head: Normocephalic.      Right Ear: External ear normal.      Left Ear: External ear normal.      Nose: Nose normal.      Mouth/Throat:      Mouth: Mucous  membranes are dry.   Eyes:      Extraocular Movements: Extraocular movements intact.      Pupils: Pupils are equal, round, and reactive to light.   Cardiovascular:      Rate and Rhythm: Normal rate and regular rhythm.   Pulmonary:      Effort: Pulmonary effort is normal.   Abdominal:      General: Abdomen is flat. Bowel sounds are normal.      Palpations: Abdomen is soft.   Musculoskeletal:      Cervical back: Normal range of motion.      Right lower leg: No edema.      Left lower leg: No edema.   Skin:     General: Skin is warm and dry.   Neurological:      Mental Status: He is alert.      Motor: Weakness present.      Comments: Left hemiparesis   Psychiatric:         Mood and Affect: Mood normal.         Behavior: Behavior normal.       Dialysis Access: left upper arm AVF    Recent Results (from the past 24 hours)   Comprehensive metabolic panel    Collection Time: 02/01/25  1:04 AM   Result Value Ref Range    Sodium 137 136 - 145 mmol/L    Potassium 4.5 3.5 - 5.1 mmol/L    Chloride 97 (L) 98 - 107 mmol/L    CO2 29 23 - 31 mmol/L    Glucose 104 82 - 115 mg/dL    Blood Urea Nitrogen 28.7 (H) 8.4 - 25.7 mg/dL    Creatinine 6.30 (H) 0.72 - 1.25 mg/dL    Calcium 9.9 8.8 - 10.0 mg/dL    Protein Total 7.5 5.8 - 7.6 gm/dL    Albumin 3.0 (L) 3.4 - 4.8 g/dL    Globulin 4.5 (H) 2.4 - 3.5 gm/dL    Albumin/Globulin Ratio 0.7 (L) 1.1 - 2.0 ratio    Bilirubin Total 0.3 <=1.5 mg/dL    ALP 83 40 - 150 unit/L    ALT 15 0 - 55 unit/L    AST 14 5 - 34 unit/L    eGFR 8 mL/min/1.73/m2    Anion Gap 11.0 mEq/L    BUN/Creatinine Ratio 5    Type & Screen    Collection Time: 02/01/25  1:04 AM   Result Value Ref Range    Group & Rh O POS     Indirect Jose GEL NEG     Specimen Outdate 02/04/2025 23:59    Protime-INR    Collection Time: 02/01/25  1:04 AM   Result Value Ref Range    PT 14.2 12.5 - 14.5 seconds    INR 1.1 <=1.3   APTT    Collection Time: 02/01/25  1:04 AM   Result Value Ref Range    PTT 30.7 23.2 - 33.7 seconds   CBC with  Differential    Collection Time: 02/01/25  1:04 AM   Result Value Ref Range    WBC 6.85 4.50 - 11.50 x10(3)/mcL    RBC 2.31 (L) 4.70 - 6.10 x10(6)/mcL    Hgb 7.3 (L) 14.0 - 18.0 g/dL    Hct 23.9 (L) 42.0 - 52.0 %    .5 (H) 80.0 - 94.0 fL    MCH 31.6 (H) 27.0 - 31.0 pg    MCHC 30.5 (L) 33.0 - 36.0 g/dL    RDW 17.5 (H) 11.5 - 17.0 %    Platelet 249 130 - 400 x10(3)/mcL    MPV 8.5 7.4 - 10.4 fL    Neut % 66.4 %    Lymph % 21.2 %    Mono % 9.6 %    Eos % 1.8 %    Basophil % 0.6 %    Imm Grans % 0.4 %    Neut # 4.55 2.1 - 9.2 x10(3)/mcL    Lymph # 1.45 0.6 - 4.6 x10(3)/mcL    Mono # 0.66 0.1 - 1.3 x10(3)/mcL    Eos # 0.12 0 - 0.9 x10(3)/mcL    Baso # 0.04 <=0.2 x10(3)/mcL    Imm Gran # 0.03 0.00 - 0.04 x10(3)/mcL    NRBC% 0.0 %   Prepare RBC 1 Unit    Collection Time: 02/01/25  1:04 AM   Result Value Ref Range    UNIT NUMBER [id number]     UNIT ABO/RH O POS     DISPENSE STATUS Issued     Unit Expiration [id number]     Product Code P2062W10     Unit Blood Type Code 5100     CROSSMATCH INTERPRETATION Compatible    ABORH RETYPE    Collection Time: 02/01/25  1:31 AM   Result Value Ref Range    ABORH Retype O POS    Comprehensive metabolic panel    Collection Time: 02/01/25  6:19 AM   Result Value Ref Range    Sodium 136 136 - 145 mmol/L    Potassium 4.9 3.5 - 5.1 mmol/L    Chloride 97 (L) 98 - 107 mmol/L    CO2 28 23 - 31 mmol/L    Glucose 89 82 - 115 mg/dL    Blood Urea Nitrogen 30.1 (H) 8.4 - 25.7 mg/dL    Creatinine 6.38 (H) 0.72 - 1.25 mg/dL    Calcium 9.5 8.8 - 10.0 mg/dL    Protein Total 7.2 5.8 - 7.6 gm/dL    Albumin 2.9 (L) 3.4 - 4.8 g/dL    Globulin 4.3 (H) 2.4 - 3.5 gm/dL    Albumin/Globulin Ratio 0.7 (L) 1.1 - 2.0 ratio    Bilirubin Total 0.4 <=1.5 mg/dL    ALP 75 40 - 150 unit/L    ALT 16 0 - 55 unit/L    AST 18 5 - 34 unit/L    eGFR 8 mL/min/1.73/m2    Anion Gap 11.0 mEq/L    BUN/Creatinine Ratio 5    CBC with Differential    Collection Time: 02/01/25  6:19 AM   Result Value Ref Range    WBC 7.18  4.50 - 11.50 x10(3)/mcL    RBC 2.76 (L) 4.70 - 6.10 x10(6)/mcL    Hgb 8.7 (L) 14.0 - 18.0 g/dL    Hct 27.3 (L) 42.0 - 52.0 %    MCV 98.9 (H) 80.0 - 94.0 fL    MCH 31.5 (H) 27.0 - 31.0 pg    MCHC 31.9 (L) 33.0 - 36.0 g/dL    RDW 18.1 (H) 11.5 - 17.0 %    Platelet 204 130 - 400 x10(3)/mcL    MPV 8.8 7.4 - 10.4 fL    Neut % 59.0 %    Lymph % 25.1 %    Mono % 12.5 %    Eos % 2.2 %    Basophil % 0.6 %    Imm Grans % 0.6 %    Neut # 4.24 2.1 - 9.2 x10(3)/mcL    Lymph # 1.80 0.6 - 4.6 x10(3)/mcL    Mono # 0.90 0.1 - 1.3 x10(3)/mcL    Eos # 0.16 0 - 0.9 x10(3)/mcL    Baso # 0.04 <=0.2 x10(3)/mcL    Imm Gran # 0.04 0.00 - 0.04 x10(3)/mcL    NRBC% 0.3 %       No results found.      Assessment & Plan:    ESRD MWF  Acute on chronic anemia   Gastrointestinal hemorrhage    Recommendations:    - no indication for acute HD today.  We will recheck labs in a.m..  We will check iron studies, phos and Mag as well.  Check PTH.  We will otherwise plan to dialyze him on his routine Monday Wednesday Friday schedule as long as he is hospitalized.    Thank you for allowing us to participate in this patient's care.

## 2025-02-01 NOTE — NURSING
"Admit questions answered per pt, need for further questioning d/t pt's inadequate participation. LBM: 1/31. PMH dementia, no other functional deficits reported. Pt states "mhm" when asked if he ambulates independently. Pt states "uhuh" when asked if he has any fears/concerns. Pt on dialysis MWF, Nephrology consulted. Pt reports recent loss of weight but is unsure of how much, no decrease in appetite. Home med list unable to be completed d/t pt not responding to questions regarding home med list. Obtained med list from nursing home documents. Nurse notified. Pt resting in bed, no further concerns.   "

## 2025-02-01 NOTE — Clinical Note
Diagnosis: Anemia [030064]   Future Attending Provider: ZARIA ELIZALDE [[id number]]   Admit to which facility:: [facility] [[id number]]   Reason for IP Medical Treatment  (Clinical interventions that can only be accomplished in the IP setting? ) :: anemia

## 2025-02-02 LAB
ALBUMIN SERPL-MCNC: 2.1 G/DL (ref 3.4–4.8)
ALBUMIN/GLOB SERPL: 0.8 RATIO (ref 1.1–2)
ALP SERPL-CCNC: 59 UNIT/L (ref 40–150)
ALT SERPL-CCNC: 9 UNIT/L (ref 0–55)
ANION GAP SERPL CALC-SCNC: 7 MEQ/L
AST SERPL-CCNC: 8 UNIT/L (ref 5–34)
BASOPHILS # BLD AUTO: 0.02 X10(3)/MCL
BASOPHILS NFR BLD AUTO: 0.3 %
BILIRUB SERPL-MCNC: 0.3 MG/DL
BUN SERPL-MCNC: 30.9 MG/DL (ref 8.4–25.7)
CALCIUM SERPL-MCNC: 7.1 MG/DL (ref 8.8–10)
CHLORIDE SERPL-SCNC: 112 MMOL/L (ref 98–107)
CO2 SERPL-SCNC: 20 MMOL/L (ref 23–31)
CREAT SERPL-MCNC: 6.07 MG/DL (ref 0.72–1.25)
CREAT/UREA NIT SERPL: 5
EOSINOPHIL # BLD AUTO: 0.11 X10(3)/MCL (ref 0–0.9)
EOSINOPHIL NFR BLD AUTO: 1.8 %
ERYTHROCYTE [DISTWIDTH] IN BLOOD BY AUTOMATED COUNT: 18.2 % (ref 11.5–17)
GFR SERPLBLD CREATININE-BSD FMLA CKD-EPI: 9 ML/MIN/1.73/M2
GLOBULIN SER-MCNC: 2.5 GM/DL (ref 2.4–3.5)
GLUCOSE SERPL-MCNC: 68 MG/DL (ref 82–115)
HCT VFR BLD AUTO: 23.3 % (ref 42–52)
HCT VFR BLD AUTO: 25.5 % (ref 42–52)
HGB BLD-MCNC: 7.4 G/DL (ref 14–18)
HGB BLD-MCNC: 8.1 G/DL (ref 14–18)
IMM GRANULOCYTES # BLD AUTO: 0.02 X10(3)/MCL (ref 0–0.04)
IMM GRANULOCYTES NFR BLD AUTO: 0.3 %
LYMPHOCYTES # BLD AUTO: 1.14 X10(3)/MCL (ref 0.6–4.6)
LYMPHOCYTES NFR BLD AUTO: 18.2 %
MAGNESIUM SERPL-MCNC: 2.4 MG/DL (ref 1.6–2.6)
MCH RBC QN AUTO: 32 PG (ref 27–31)
MCHC RBC AUTO-ENTMCNC: 31.8 G/DL (ref 33–36)
MCV RBC AUTO: 100.9 FL (ref 80–94)
MONOCYTES # BLD AUTO: 0.46 X10(3)/MCL (ref 0.1–1.3)
MONOCYTES NFR BLD AUTO: 7.3 %
NEUTROPHILS # BLD AUTO: 4.53 X10(3)/MCL (ref 2.1–9.2)
NEUTROPHILS NFR BLD AUTO: 72.1 %
NRBC BLD AUTO-RTO: 0 %
PHOSPHATE SERPL-MCNC: 3.5 MG/DL (ref 2.3–4.7)
PLATELET # BLD AUTO: 205 X10(3)/MCL (ref 130–400)
PMV BLD AUTO: 8.3 FL (ref 7.4–10.4)
POTASSIUM SERPL-SCNC: 3.5 MMOL/L (ref 3.5–5.1)
PROT SERPL-MCNC: 4.6 GM/DL (ref 5.8–7.6)
PTH-INTACT SERPL-MCNC: 250.2 PG/ML (ref 8.7–77)
RBC # BLD AUTO: 2.31 X10(6)/MCL (ref 4.7–6.1)
SODIUM SERPL-SCNC: 139 MMOL/L (ref 136–145)
WBC # BLD AUTO: 6.28 X10(3)/MCL (ref 4.5–11.5)

## 2025-02-02 PROCEDURE — 86923 COMPATIBILITY TEST ELECTRIC: CPT | Mod: 91 | Performed by: INTERNAL MEDICINE

## 2025-02-02 PROCEDURE — 63600175 PHARM REV CODE 636 W HCPCS: Performed by: EMERGENCY MEDICINE

## 2025-02-02 PROCEDURE — 84100 ASSAY OF PHOSPHORUS: CPT | Performed by: NURSE PRACTITIONER

## 2025-02-02 PROCEDURE — 83970 ASSAY OF PARATHORMONE: CPT | Performed by: NURSE PRACTITIONER

## 2025-02-02 PROCEDURE — 63600175 PHARM REV CODE 636 W HCPCS

## 2025-02-02 PROCEDURE — 36415 COLL VENOUS BLD VENIPUNCTURE: CPT | Performed by: NURSE PRACTITIONER

## 2025-02-02 PROCEDURE — 63600175 PHARM REV CODE 636 W HCPCS: Performed by: INTERNAL MEDICINE

## 2025-02-02 PROCEDURE — 80053 COMPREHEN METABOLIC PANEL: CPT | Performed by: NURSE PRACTITIONER

## 2025-02-02 PROCEDURE — 83735 ASSAY OF MAGNESIUM: CPT | Performed by: NURSE PRACTITIONER

## 2025-02-02 PROCEDURE — 85025 COMPLETE CBC W/AUTO DIFF WBC: CPT | Performed by: NURSE PRACTITIONER

## 2025-02-02 PROCEDURE — 21400001 HC TELEMETRY ROOM

## 2025-02-02 PROCEDURE — 25000003 PHARM REV CODE 250: Performed by: EMERGENCY MEDICINE

## 2025-02-02 PROCEDURE — 36415 COLL VENOUS BLD VENIPUNCTURE: CPT | Performed by: INTERNAL MEDICINE

## 2025-02-02 PROCEDURE — 25000003 PHARM REV CODE 250: Performed by: INTERNAL MEDICINE

## 2025-02-02 PROCEDURE — 85018 HEMOGLOBIN: CPT | Performed by: INTERNAL MEDICINE

## 2025-02-02 RX ORDER — PANTOPRAZOLE SODIUM 40 MG/10ML
40 INJECTION, POWDER, LYOPHILIZED, FOR SOLUTION INTRAVENOUS 2 TIMES DAILY
Status: DISCONTINUED | OUTPATIENT
Start: 2025-02-02 | End: 2025-02-05

## 2025-02-02 RX ORDER — HYDROCODONE BITARTRATE AND ACETAMINOPHEN 500; 5 MG/1; MG/1
TABLET ORAL
Status: DISCONTINUED | OUTPATIENT
Start: 2025-02-02 | End: 2025-02-05 | Stop reason: HOSPADM

## 2025-02-02 RX ADMIN — SERTRALINE HYDROCHLORIDE 25 MG: 25 TABLET ORAL at 09:02

## 2025-02-02 RX ADMIN — SODIUM CHLORIDE 8 MG/HR: 900 INJECTION INTRAVENOUS at 09:02

## 2025-02-02 RX ADMIN — SODIUM CHLORIDE 8 MG/HR: 900 INJECTION INTRAVENOUS at 01:02

## 2025-02-02 RX ADMIN — TAMSULOSIN HYDROCHLORIDE 0.8 MG: 0.4 CAPSULE ORAL at 08:02

## 2025-02-02 RX ADMIN — MEMANTINE 10 MG: 5 TABLET ORAL at 09:02

## 2025-02-02 RX ADMIN — CARVEDILOL 3.12 MG: 3.12 TABLET, FILM COATED ORAL at 09:02

## 2025-02-02 RX ADMIN — CARVEDILOL 3.12 MG: 3.12 TABLET, FILM COATED ORAL at 05:02

## 2025-02-02 RX ADMIN — LEVETIRACETAM 500 MG: 500 TABLET, FILM COATED ORAL at 08:02

## 2025-02-02 RX ADMIN — LEVETIRACETAM 500 MG: 500 TABLET, FILM COATED ORAL at 09:02

## 2025-02-02 RX ADMIN — TRAZODONE HYDROCHLORIDE 25 MG: 50 TABLET ORAL at 08:02

## 2025-02-02 RX ADMIN — SODIUM CHLORIDE: 9 INJECTION, SOLUTION INTRAVENOUS at 03:02

## 2025-02-02 RX ADMIN — CINACALCET 30 MG: 30 TABLET, FILM COATED ORAL at 09:02

## 2025-02-02 RX ADMIN — ATORVASTATIN CALCIUM 40 MG: 40 TABLET, FILM COATED ORAL at 08:02

## 2025-02-02 RX ADMIN — PANTOPRAZOLE SODIUM 40 MG: 40 INJECTION, POWDER, LYOPHILIZED, FOR SOLUTION INTRAVENOUS at 08:02

## 2025-02-02 NOTE — CONSULTS
Angel Pablo Sr.   MRN: [id number]   ADMISSION DATE: 2025  : 1945  AGE: 79 y.o.    DATE :  2025     PROVIDER: RADHA RUELAS    REASON FOR REFERRAL   GIB     SUBJECTIVE:  79-year-old male with a history of ESRD on dialysis  presents ED for evaluation of abnormal labs. He had labs performed yesterday with a low H&H. He denies any complaints at this time. He states he dialyze yesterday.     Patient is not a good historian.  He lives in a nursing home.  There is no history of melena, hematochezia bright red blood per rectum.  No hematemesis.  He was given 1 unit of packed RBC.  Post transfusion hemoglobin is 8.7 and has remained stable.  Patient had a colonoscopy in  reported.  I am not able to retrieve the report.    Review of Systems   Awake.  Not much communicative.  No overt GI blood loss reported.  No cardiopulmonary symptoms reported.    Review of patient's allergies indicates:  No Known Allergies      atorvastatin  40 mg Oral Nightly    carvediloL  3.125 mg Oral BID WM    [START ON 2025] cinacalcet  30 mg Oral Every other day    levETIRAcetam  500 mg Oral BID    memantine  10 mg Oral Daily    sertraline  25 mg Oral Daily    tamsulosin  0.8 mg Oral QHS    trazodone  25 mg Oral QHS       Medications Discontinued During This Encounter   Medication Reason    aspirin (ECOTRIN) 81 MG EC tablet Error    potassium chloride (KLOR-CON) 10 MEQ TbSR Error    zinc gluconate 50 mg tablet Error         0.9% NaCl   Intravenous Continuous 60 mL/hr at 25 1347 New Bag at 25 1347    pantoprazole (PROTONIX) IV infusion  8 mg/hr Intravenous Continuous 20 mL/hr at 25 194 8 mg/hr at 25        Past Medical History:   Diagnosis Date    Benign prostatic hyperplasia with lower urinary tract symptoms     Carotid artery stenosis     Dementia     Dialysis patient     M/W/F    End stage renal disease     H/O prostate cancer     Hemiplegia and hemiparesis following  cerebral infarction affecting left dominant side     Hyperlipidemia     Hypertension     Hypokalemia     Insomnia     Insomnia     Mechanical breakdown of surgically created arteriovenous fistula, subsequent encounter     Muscle wasting and atrophy, not elsewhere classified, multiple sites     Other lack of coordination     Pain in left knee     Paralytic gait     Presbyopia     Presence of intraocular lens     Repeated falls     Seizures     Stroke       Social History     Socioeconomic History    Marital status: Single   Tobacco Use    Smoking status: Never    Smokeless tobacco: Never   Substance and Sexual Activity    Alcohol use: Not Currently    Drug use: Never    Sexual activity: Not Currently   Social History Narrative    ** Merged History Encounter **          Social Drivers of Health     Financial Resource Strain: Patient Declined (2/1/2025)    Overall Financial Resource Strain (CARDIA)     Difficulty of Paying Living Expenses: Patient declined   Food Insecurity: Patient Declined (2/1/2025)    Hunger Vital Sign     Worried About Running Out of Food in the Last Year: Patient declined     Ran Out of Food in the Last Year: Patient declined   Transportation Needs: Patient Declined (2/1/2025)    TRANSPORTATION NEEDS     Transportation : Patient declined   Stress: Patient Declined (2/1/2025)    [employer] - Occupational Stress Questionnaire     Feeling of Stress : Patient declined   Housing Stability: Patient Declined (2/1/2025)    Housing Stability Vital Sign     Unable to Pay for Housing in the Last Year: Patient declined     Homeless in the Last Year: Patient declined      Past Surgical History:   Procedure Laterality Date    COLONOSCOPY      EXCISION OF ARTERIOVENOUS FISTULA Left 05/03/2023    Procedure: EXCISION, AV FISTULA;  Surgeon: Natalie Delatorre MD;  Location: [facility];  Service: Peripheral Vascular;  Laterality: Left;  LEFT UPPER ARM FISTULA REVISION WITH TUNNELED  CATHETER INSERTION // VASCULAR // SUPINE // SUPRACLAVICULAR BLOCK    FISTULOGRAM N/A 04/20/2023    Procedure: Fistulogram;  Surgeon: Akil Escobar DO;  Location: [facility] CATH LAB;  Service: Nephrology;  Laterality: N/A;  LUE FISTULOGRAM    FISTULOGRAM Left 08/22/2023    Dr. Escobar    FISTULOGRAM Left 8/22/2023    Procedure: Fistulogram;  Surgeon: Akil Escobar DO;  Location: [facility] CATH LAB;  Service: Nephrology;  Laterality: Left;    FISTULOGRAM Left 9/28/2023    Procedure: Fistulogram;  Surgeon: Akil Escobar DO;  Location: [facility] CATH LAB;  Service: Nephrology;  Laterality: Left;    INSERTION, VASCULAR ACCESS CATHETER N/A 05/03/2023    Procedure: INSERTION, VASCULAR ACCESS CATHETER;  Surgeon: Natalie Delatorre MD;  Location: [facility] OR;  Service: Peripheral Vascular;  Laterality: N/A;    KNEE SURGERY Left     REMOVAL OF TUNNELED CENTRAL VENOUS CATHETER (CVC) N/A 9/28/2023    Procedure: REMOVAL, CATHETER, CENTRAL VENOUS, TUNNELED;  Surgeon: Akil Escobar DO;  Location: [facility] CATH LAB;  Service: Nephrology;  Laterality: N/A;    REMOVAL OF TUNNELED CENTRAL VENOUS CATHETER (CVC) N/A 10/31/2023    Procedure: REMOVAL, CATHETER, CENTRAL VENOUS, TUNNELED;  Surgeon: Akil Escobar DO;  Location: [facility] CATH LAB;  Service: Nephrology;  Laterality: N/A;    THROMBECTOMY Left 12/27/2024    Procedure: THROMBECTOMY;  Surgeon: Akil Escobar DO;  Location: [facility] CATH LAB;  Service: Nephrology;  Laterality: Left;    TRANSPOSITION OF BASILIC VEIN Left 7/3/2023    Procedure: TRANSPOSITION, VEIN, BASILIC;  Surgeon: Natalie Delatorre MD;  Location: [facility] OR;  Service: Peripheral Vascular;  Laterality: Left;  left radiocephalic vs basilic fistula creation, supraclavicular block // XX        Family History   Family history unknown: Yes          OBJECTIVE:     Vitals:    02/01/25 1441 02/01/25 1504 02/01/25 1737 02/01/25 2001   BP: 112/65 123/63 123/63 132/68   Pulse: 72 72  71   Resp:    18   Temp: 97.8 °F (36.6 °C) 98.1 °F (36.7 °C)  98.2 °F  "(36.8 °C)   TempSrc: Oral Oral  Oral   SpO2: (!) 94% 95%  (!) 93%   Weight:       Height:             Physical Exam     Constitutional:       Appearance:  Awake.  Not much communicative.     Comments:  HENT:      Head: Normocephalic.      Right Ear: External ear normal.      Left Ear: External ear normal.      Nose: Nose normal.      Mouth/Throat:      Mouth: Mucous membranes are dry.   Eyes:      Extraocular Movements: Extraocular movements intact.      Pupils: Pupils are equal, round, and reactive to light.   Cardiovascular:      Rate and Rhythm: Normal rate and regular rhythm.   Pulmonary:      Effort: Pulmonary effort is normal.   Abdominal:      General: Abdomen is flat. Bowel sounds are normal.      Palpations: Abdomen is soft.   Musculoskeletal:      Cervical back: Normal range of motion.      Right lower leg: No edema.      Left lower leg: No edema.   Skin:     General: Skin is warm and dry.   Neurological:      Mental Status: He is alert.      Motor:  Difficult to evaluate.     Comments: Left hemiparesis   Psychiatric:         Mood and Affect: Mood normal.         Behavior: Behavior normal.         LABS    Recent Labs   Lab 02/01/25 0104 02/01/25  0619 02/01/25  1540   WBC 6.85 7.18  --    HGB 7.3* 8.7* 8.6*   HCT 23.9* 27.3* 26.9*    204  --       Recent Labs   Lab 02/01/25 0104 02/01/25  0619    136   K 4.5 4.9   CL 97* 97*   CO2 29 28   BUN 28.7* 30.1*   CREATININE 6.30* 6.38*   CALCIUM 9.9 9.5   BILITOT 0.3 0.4   ALKPHOS 83 75   ALT 15 16   AST 14 18   GLUCOSE 104 89      Recent Labs   Lab 02/01/25 0104   INR 1.1    No results for input(s): "AMYLASE" in the last 168 hours.   Recent Labs   Lab 02/01/25 0104   APTT 30.7   INR 1.1           RESULTS: No results found.           ICD-10-CM ICD-9-CM   1. ESRD (end stage renal disease)  N18.6 585.6   2. Anemia  D64.9 285.9   3. Gastrointestinal hemorrhage, unspecified gastrointestinal hemorrhage type  K92.2 578.9   4. Acute on chronic anemia  " D64.9 285.9            ASSESSMENT & PLAN:    ESRD MWF  Acute on chronic anemia   Gastrointestinal hemorrhage     79-year-old male with a history of ESRD on dialysis Monday Wednesday Friday presents ED for evaluation of abnormal labs. He had labs performed yesterday with a low H&H. He denies any complaints at this time. He states he dialyze yesterday.     Recommendations:   1. Follow up hemoglobin/hematocrit.  No evidence of overt GI blood loss.  EGD can be done possibly on Monday if continues to remain stable.  It can be done earlier if necessary pending course in hospital.   NPO after midnight on Sunday  2. Need to clarify in regards to his last colonoscopy.  He is reported to have a colonoscopy in 2023.   3. Patient can be on clear liquid diet.  Follow up hemoglobin/hematocrit.  If hemoglobin below 7, transfuse additional packed RBC.    4. Patient is being followed up by Nephrology service.      Thank you for the consultation.

## 2025-02-02 NOTE — NURSING
Patient's H/H dropped from 8.6/26.9 to 7.4/23.3, no gross bleeding noted overnight, noted that blood was extracted above IV line with IV fluid running confirmed by phlebotomist, Dr. Prater made aware, ordered to re-draw bloods, Phlebotomist informed and came to draw blood.

## 2025-02-02 NOTE — PROGRESS NOTES
[facility] - 5th Floor [facility] MEDICINE ~ PROGRESS NOTE        CHIEF COMPLAINT   Hospital follow up    HOSPITAL COURSE   79 year old male with pmh of cva, esrd hd, dementia, hld, htn, parkinson.  He was referrred to the ED due to abnormal labs which showed signicant anemia hg of 7.3.   He reports no episode of bleeding hematemesis or melena, no complaints.  Has some residual left-sided weakness from previous stroke.    Today  Seen and examined this morning.  Reports having no bleeding episodes.  He appears to be more alert today than yesterday.  His normal dialysis days are actually Tuesday Thursday Saturday and did not get it yesterday so we will get Nephrology to do it today.        OBJECTIVE/PHYSICAL EXAM     VITAL SIGNS (MOST RECENT):  Temp: 97.7 °F (36.5 °C) (02/02/25 0810)  Pulse: 66 (02/02/25 0810)  Resp: 19 (02/02/25 0302)  BP: (!) 110/50 (02/02/25 0810)  SpO2: (!) 94 % (02/02/25 0810) VITAL SIGNS (24 HOUR RANGE):  Temp:  [97.6 °F (36.4 °C)-98.2 °F (36.8 °C)] 97.7 °F (36.5 °C)  Pulse:  [66-77] 66  Resp:  [16-19] 19  SpO2:  [93 %-100 %] 94 %  BP: ()/(46-68) 110/50   GENERAL: In no acute distress, afebrile  HEENT:  CHEST: Clear to auscultation bilaterally  HEART: S1, S2, no appreciable murmur  ABDOMEN: Soft, nontender, BS +  MSK: Warm, no lower extremity edema, no clubbing or cyanosis  NEUROLOGIC: Alert and oriented x4, moving all extremities with good strength   INTEGUMENTARY:  PSYCHIATRY:        ASSESSMENT/PLAN   FOBT +  Acute on chronic anemia requiring blood transfusion  Esrd hd      History of: as above        Nephrology following.  Tuesday Thursday Saturday schedule.  May need to run today since he did not go Saturday.  S/p blood in the er, follow h/h  GI following.  Continue Protonix drip but can be switch to pushes once this bag is finished.  Okay for diet today since there is no obvious GI bleed, NPO after midnight for possible EGD in the morning.    DVT prophylaxis:      Anticipated discharge and disposition:   __________________________________________________________________________    NUTRITIONAL STATUS     Patient meets ASPEN criteria for   malnutrition of   per RD assessment as evidenced by:                       A minimum of two characteristics is recommended for diagnosis of either severe or non-severe malnutrition.     LABS/MICRO/MEDS/DIAGNOSTICS       LABS  Recent Labs     02/02/25 0525      K 3.5   CO2 20*   BUN 30.9*   CREATININE 6.07*   GLUCOSE 68*   CALCIUM 7.1*   ALKPHOS 59   AST 8   ALT 9   ALBUMIN 2.1*     Recent Labs     02/02/25 0525 02/02/25  0720   WBC 6.28  --    RBC 2.31*  --    HCT 23.3* 25.5*   .9*  --      --        MICROBIOLOGY  Microbiology Results (last 7 days)       ** No results found for the last 168 hours. **               MEDICATIONS   atorvastatin  40 mg Oral Nightly    carvediloL  3.125 mg Oral BID WM    cinacalcet  30 mg Oral Every other day    levETIRAcetam  500 mg Oral BID    memantine  10 mg Oral Daily    sertraline  25 mg Oral Daily    tamsulosin  0.8 mg Oral QHS    trazodone  25 mg Oral QHS         INFUSIONS   pantoprazole (PROTONIX) IV infusion  8 mg/hr Intravenous Continuous 20 mL/hr at 02/02/25 0141 8 mg/hr at 02/02/25 0141          DIAGNOSTIC TESTS  No orders to display        No echocardiogram results found for the past 14 days.         Case related differential diagnoses have been reviewed; assessment and plan has been documented. I have personally reviewed the labs and test results that are currently available; I have reviewed the patients medication list. I have reviewed the consulting providers recommendations. I have reviewed or attempted to review medical records based upon their availability.  All of the patient's and/or family's questions have been addressed and answered to the best of my ability.  I will continue to monitor closely and make adjustments to medical management as needed.  This document was  created using M*Modal Fluency Direct.  Transcription errors may have been made.  Please contact me if any questions may rise regarding documentation to clarify transcription.        Bob Prater MD   Internal Medicine  Department of Hospital Medicine  [facility] - 5th Floor Med Surg

## 2025-02-02 NOTE — PLAN OF CARE
Problem: Adult Inpatient Plan of Care  Goal: Plan of Care Review  Outcome: Progressing  Goal: Patient-Specific Goal (Individualized)  Outcome: Progressing  Goal: Absence of Hospital-Acquired Illness or Injury  Outcome: Progressing  Goal: Optimal Comfort and Wellbeing  Outcome: Progressing  Goal: Readiness for Transition of Care  Outcome: Progressing     Problem: Skin Injury Risk Increased  Goal: Skin Health and Integrity  Outcome: Progressing     Problem: Fall Injury Risk  Goal: Absence of Fall and Fall-Related Injury  Outcome: Progressing     Problem: Pain Acute  Goal: Optimal Pain Control and Function  Outcome: Progressing     Problem: Gastrointestinal Bleeding  Goal: Optimal Coping with Acute Illness  Outcome: Progressing  Goal: Hemostasis  Outcome: Progressing

## 2025-02-03 ENCOUNTER — ANESTHESIA (OUTPATIENT)
Dept: ENDOSCOPY | Facility: HOSPITAL | Age: 80
DRG: 682 | End: 2025-02-03
Payer: MEDICARE

## 2025-02-03 ENCOUNTER — ANESTHESIA EVENT (OUTPATIENT)
Dept: ENDOSCOPY | Facility: HOSPITAL | Age: 80
DRG: 682 | End: 2025-02-03
Payer: MEDICARE

## 2025-02-03 LAB
ALBUMIN SERPL-MCNC: 2.7 G/DL (ref 3.4–4.8)
ALBUMIN/GLOB SERPL: 0.8 RATIO (ref 1.1–2)
ALP SERPL-CCNC: 78 UNIT/L (ref 40–150)
ALT SERPL-CCNC: 10 UNIT/L (ref 0–55)
ANION GAP SERPL CALC-SCNC: 11 MEQ/L
AST SERPL-CCNC: 9 UNIT/L (ref 5–34)
BASOPHILS # BLD AUTO: 0.02 X10(3)/MCL
BASOPHILS NFR BLD AUTO: 0.3 %
BILIRUB SERPL-MCNC: 0.4 MG/DL
BUN SERPL-MCNC: 49.2 MG/DL (ref 8.4–25.7)
CALCIUM SERPL-MCNC: 9.3 MG/DL (ref 8.8–10)
CHLORIDE SERPL-SCNC: 101 MMOL/L (ref 98–107)
CO2 SERPL-SCNC: 24 MMOL/L (ref 23–31)
CREAT SERPL-MCNC: 9.95 MG/DL (ref 0.72–1.25)
CREAT/UREA NIT SERPL: 5
EOSINOPHIL # BLD AUTO: 0.14 X10(3)/MCL (ref 0–0.9)
EOSINOPHIL NFR BLD AUTO: 2.3 %
ERYTHROCYTE [DISTWIDTH] IN BLOOD BY AUTOMATED COUNT: 17.9 % (ref 11.5–17)
GFR SERPLBLD CREATININE-BSD FMLA CKD-EPI: 5 ML/MIN/1.73/M2
GLOBULIN SER-MCNC: 3.5 GM/DL (ref 2.4–3.5)
GLUCOSE SERPL-MCNC: 77 MG/DL (ref 82–115)
HBV SURFACE AG SERPL QL IA: NONREACTIVE
HCT VFR BLD AUTO: 24.7 % (ref 42–52)
HGB BLD-MCNC: 7.7 G/DL (ref 14–18)
IMM GRANULOCYTES # BLD AUTO: 0.02 X10(3)/MCL (ref 0–0.04)
IMM GRANULOCYTES NFR BLD AUTO: 0.3 %
LYMPHOCYTES # BLD AUTO: 0.94 X10(3)/MCL (ref 0.6–4.6)
LYMPHOCYTES NFR BLD AUTO: 15.6 %
MCH RBC QN AUTO: 31.2 PG (ref 27–31)
MCHC RBC AUTO-ENTMCNC: 31.2 G/DL (ref 33–36)
MCV RBC AUTO: 100 FL (ref 80–94)
MONOCYTES # BLD AUTO: 0.5 X10(3)/MCL (ref 0.1–1.3)
MONOCYTES NFR BLD AUTO: 8.3 %
NEUTROPHILS # BLD AUTO: 4.41 X10(3)/MCL (ref 2.1–9.2)
NEUTROPHILS NFR BLD AUTO: 73.2 %
NRBC BLD AUTO-RTO: 0 %
PLATELET # BLD AUTO: 223 X10(3)/MCL (ref 130–400)
PMV BLD AUTO: 8.2 FL (ref 7.4–10.4)
POCT GLUCOSE: 105 MG/DL (ref 70–110)
POTASSIUM SERPL-SCNC: 5 MMOL/L (ref 3.5–5.1)
PROT SERPL-MCNC: 6.2 GM/DL (ref 5.8–7.6)
RBC # BLD AUTO: 2.47 X10(6)/MCL (ref 4.7–6.1)
SODIUM SERPL-SCNC: 136 MMOL/L (ref 136–145)
WBC # BLD AUTO: 6.03 X10(3)/MCL (ref 4.5–11.5)

## 2025-02-03 PROCEDURE — 80053 COMPREHEN METABOLIC PANEL: CPT | Performed by: NURSE PRACTITIONER

## 2025-02-03 PROCEDURE — 86706 HEP B SURFACE ANTIBODY: CPT | Performed by: INTERNAL MEDICINE

## 2025-02-03 PROCEDURE — 25000003 PHARM REV CODE 250: Performed by: INTERNAL MEDICINE

## 2025-02-03 PROCEDURE — D9220A PRA ANESTHESIA: Mod: CRNA,,, | Performed by: NURSE ANESTHETIST, CERTIFIED REGISTERED

## 2025-02-03 PROCEDURE — 87340 HEPATITIS B SURFACE AG IA: CPT | Performed by: INTERNAL MEDICINE

## 2025-02-03 PROCEDURE — 37000008 HC ANESTHESIA 1ST 15 MINUTES: Performed by: INTERNAL MEDICINE

## 2025-02-03 PROCEDURE — 21400001 HC TELEMETRY ROOM

## 2025-02-03 PROCEDURE — 80100016 HC MAINTENANCE HEMODIALYSIS

## 2025-02-03 PROCEDURE — 63600175 PHARM REV CODE 636 W HCPCS: Performed by: NURSE ANESTHETIST, CERTIFIED REGISTERED

## 2025-02-03 PROCEDURE — 85025 COMPLETE CBC W/AUTO DIFF WBC: CPT | Performed by: NURSE PRACTITIONER

## 2025-02-03 PROCEDURE — 43239 EGD BIOPSY SINGLE/MULTIPLE: CPT | Performed by: INTERNAL MEDICINE

## 2025-02-03 PROCEDURE — 5A1D70Z PERFORMANCE OF URINARY FILTRATION, INTERMITTENT, LESS THAN 6 HOURS PER DAY: ICD-10-PCS | Performed by: STUDENT IN AN ORGANIZED HEALTH CARE EDUCATION/TRAINING PROGRAM

## 2025-02-03 PROCEDURE — 27201423 OPTIME MED/SURG SUP & DEVICES STERILE SUPPLY: Performed by: INTERNAL MEDICINE

## 2025-02-03 PROCEDURE — 88305 TISSUE EXAM BY PATHOLOGIST: CPT | Performed by: INTERNAL MEDICINE

## 2025-02-03 PROCEDURE — 25000003 PHARM REV CODE 250: Performed by: NURSE ANESTHETIST, CERTIFIED REGISTERED

## 2025-02-03 PROCEDURE — 63600175 PHARM REV CODE 636 W HCPCS

## 2025-02-03 PROCEDURE — 37000009 HC ANESTHESIA EA ADD 15 MINS: Performed by: INTERNAL MEDICINE

## 2025-02-03 PROCEDURE — 63600175 PHARM REV CODE 636 W HCPCS: Performed by: INTERNAL MEDICINE

## 2025-02-03 PROCEDURE — 0DB68ZX EXCISION OF STOMACH, VIA NATURAL OR ARTIFICIAL OPENING ENDOSCOPIC, DIAGNOSTIC: ICD-10-PCS | Performed by: INTERNAL MEDICINE

## 2025-02-03 PROCEDURE — D9220A PRA ANESTHESIA: Mod: ANES,,, | Performed by: ANESTHESIOLOGY

## 2025-02-03 PROCEDURE — 36415 COLL VENOUS BLD VENIPUNCTURE: CPT | Performed by: NURSE PRACTITIONER

## 2025-02-03 RX ORDER — PHENYLEPHRINE HCL IN 0.9% NACL 1 MG/10 ML
SYRINGE (ML) INTRAVENOUS
Status: COMPLETED
Start: 2025-02-03 | End: 2025-02-03

## 2025-02-03 RX ORDER — SODIUM CHLORIDE 9 MG/ML
INJECTION, SOLUTION INTRAVENOUS
OUTPATIENT
Start: 2025-02-03

## 2025-02-03 RX ORDER — ONDANSETRON HYDROCHLORIDE 2 MG/ML
INJECTION, SOLUTION INTRAVENOUS
Status: COMPLETED
Start: 2025-02-03 | End: 2025-02-03

## 2025-02-03 RX ORDER — SODIUM CHLORIDE, SODIUM LACTATE, POTASSIUM CHLORIDE, CALCIUM CHLORIDE 600; 310; 30; 20 MG/100ML; MG/100ML; MG/100ML; MG/100ML
INJECTION, SOLUTION INTRAVENOUS CONTINUOUS PRN
Status: DISCONTINUED | OUTPATIENT
Start: 2025-02-03 | End: 2025-02-03

## 2025-02-03 RX ORDER — PROPOFOL 10 MG/ML
INJECTION, EMULSION INTRAVENOUS
Status: DISCONTINUED | OUTPATIENT
Start: 2025-02-03 | End: 2025-02-03

## 2025-02-03 RX ORDER — PHENYLEPHRINE HCL IN 0.9% NACL 1 MG/10 ML
SYRINGE (ML) INTRAVENOUS
Status: DISCONTINUED | OUTPATIENT
Start: 2025-02-03 | End: 2025-02-03

## 2025-02-03 RX ORDER — SODIUM, POTASSIUM,MAG SULFATES 17.5-3.13G
1 SOLUTION, RECONSTITUTED, ORAL ORAL 2 TIMES DAILY
Status: COMPLETED | OUTPATIENT
Start: 2025-02-03 | End: 2025-02-04

## 2025-02-03 RX ORDER — PROPOFOL 10 MG/ML
VIAL (ML) INTRAVENOUS
Status: COMPLETED
Start: 2025-02-03 | End: 2025-02-03

## 2025-02-03 RX ORDER — LIDOCAINE HYDROCHLORIDE 20 MG/ML
INJECTION, SOLUTION EPIDURAL; INFILTRATION; INTRACAUDAL; PERINEURAL
Status: DISPENSED
Start: 2025-02-03 | End: 2025-02-03

## 2025-02-03 RX ORDER — DEXAMETHASONE SODIUM PHOSPHATE 4 MG/ML
INJECTION, SOLUTION INTRA-ARTICULAR; INTRALESIONAL; INTRAMUSCULAR; INTRAVENOUS; SOFT TISSUE
Status: DISCONTINUED | OUTPATIENT
Start: 2025-02-03 | End: 2025-02-03

## 2025-02-03 RX ORDER — SODIUM CHLORIDE 9 MG/ML
INJECTION, SOLUTION INTRAVENOUS ONCE
OUTPATIENT
Start: 2025-02-03 | End: 2025-02-03

## 2025-02-03 RX ORDER — LIDOCAINE HYDROCHLORIDE 20 MG/ML
INJECTION INTRAVENOUS
Status: DISCONTINUED | OUTPATIENT
Start: 2025-02-03 | End: 2025-02-03

## 2025-02-03 RX ORDER — MUPIROCIN 20 MG/G
OINTMENT TOPICAL 2 TIMES DAILY
OUTPATIENT
Start: 2025-02-03 | End: 2025-02-08

## 2025-02-03 RX ORDER — DEXAMETHASONE SODIUM PHOSPHATE 4 MG/ML
INJECTION, SOLUTION INTRA-ARTICULAR; INTRALESIONAL; INTRAMUSCULAR; INTRAVENOUS; SOFT TISSUE
Status: COMPLETED
Start: 2025-02-03 | End: 2025-02-03

## 2025-02-03 RX ADMIN — PROPOFOL 40 MG: 10 INJECTION, EMULSION INTRAVENOUS at 11:02

## 2025-02-03 RX ADMIN — CARVEDILOL 3.12 MG: 3.12 TABLET, FILM COATED ORAL at 08:02

## 2025-02-03 RX ADMIN — DEXAMETHASONE SODIUM PHOSPHATE 4 MG: 4 INJECTION, SOLUTION INTRA-ARTICULAR; INTRALESIONAL; INTRAMUSCULAR; INTRAVENOUS; SOFT TISSUE at 11:02

## 2025-02-03 RX ADMIN — PANTOPRAZOLE SODIUM 40 MG: 40 INJECTION, POWDER, LYOPHILIZED, FOR SOLUTION INTRAVENOUS at 08:02

## 2025-02-03 RX ADMIN — ATORVASTATIN CALCIUM 40 MG: 40 TABLET, FILM COATED ORAL at 09:02

## 2025-02-03 RX ADMIN — TAMSULOSIN HYDROCHLORIDE 0.8 MG: 0.4 CAPSULE ORAL at 09:02

## 2025-02-03 RX ADMIN — MEMANTINE 10 MG: 5 TABLET ORAL at 08:02

## 2025-02-03 RX ADMIN — Medication 200 MCG: at 11:02

## 2025-02-03 RX ADMIN — ONDANSETRON 4 MG: 2 INJECTION INTRAMUSCULAR; INTRAVENOUS at 11:02

## 2025-02-03 RX ADMIN — TRAZODONE HYDROCHLORIDE 25 MG: 50 TABLET ORAL at 09:02

## 2025-02-03 RX ADMIN — LEVETIRACETAM 500 MG: 500 TABLET, FILM COATED ORAL at 09:02

## 2025-02-03 RX ADMIN — SERTRALINE HYDROCHLORIDE 25 MG: 25 TABLET ORAL at 08:02

## 2025-02-03 RX ADMIN — PROPOFOL 50 MG: 10 INJECTION, EMULSION INTRAVENOUS at 11:02

## 2025-02-03 RX ADMIN — SODIUM CHLORIDE, POTASSIUM CHLORIDE, SODIUM LACTATE AND CALCIUM CHLORIDE: 600; 310; 30; 20 INJECTION, SOLUTION INTRAVENOUS at 11:02

## 2025-02-03 RX ADMIN — CARVEDILOL 3.12 MG: 3.12 TABLET, FILM COATED ORAL at 07:02

## 2025-02-03 RX ADMIN — SODIUM SULFATE, POTASSIUM SULFATE, MAGNESIUM SULFATE 177 ML: 17.5; 3.13; 1.6 SOLUTION, CONCENTRATE ORAL at 08:02

## 2025-02-03 RX ADMIN — LIDOCAINE HYDROCHLORIDE 70 MG: 20 INJECTION INTRAVENOUS at 11:02

## 2025-02-03 RX ADMIN — LEVETIRACETAM 500 MG: 500 TABLET, FILM COATED ORAL at 08:02

## 2025-02-03 NOTE — PROCEDURES
Angel Pablo Sr.   MRN: [id number]   ADMISSION DATE: 2025  : 1945  AGE: 79 y.o.    DATE OF PROCEDURE:  2025     PROCEDURE:  EGD with biopsy    SURGEON: RADHA RUELAS    PREOPERATIVE DIAGNOSIS:  Anemia    POSTOPERATIVE DIAGNOSIS:  Patchy erythema, gastric body.  Biopsies obtained.  Otherwise normal exam.    HISTORY AND PHYSICAL:  As per preoperative note.      DESCRIPTION OF PROCEDURE:  Informed consent was obtained.  Patient was placed in left lateral position.  Sedation per anesthesia service.  Olympus video gastroscope was introduced into the oral cavity and esophagus was intubated under direct visualization. The scope was carefully advanced to the distal duodenum.  Patient tolerated the procedure well without any complications.    FINDINGS:  Esophagus: Normal mucosa.  No significant abnormality.   Stomach: There was patchy erythema noted in the gastric body.  Photodocumentation was obtained.  No evidence of ulceration.  Fundus and cardia appeared unremarkable.  Antrum was unremarkable.    Duodenum: Duodenal bulb, 2nd and 3rd portion of the duodenal appeared unremarkable to the extent of exam.    ESTIMATED BLOOD LOSS:  2-3 cc    COMPLICATIONS:  None    RECOMMENDATIONS:  1. Follow up biopsy results.    2. H2 blockers or PPI as needed.    3. Plan for colonoscopy tomorrow if patient agrees.  This was discussed with the patient earlier today before upper endoscopy was performed

## 2025-02-03 NOTE — ANESTHESIA POSTPROCEDURE EVALUATION
Anesthesia Post Evaluation    Patient: Angel Pablo SrSheng    Procedure(s) Performed: Procedure(s) (LRB):  EGD (N/A)    Final Anesthesia Type: MAC      Patient location during evaluation: PACU  Patient participation: Yes- Able to Participate  Level of consciousness: awake and alert  Post-procedure vital signs: reviewed and stable  Pain management: adequate  Airway patency: patent    PONV status at discharge: No PONV  Anesthetic complications: no      Cardiovascular status: blood pressure returned to baseline  Respiratory status: unassisted  Hydration status: euvolemic  Follow-up not needed.              Vitals Value Taken Time   BP 89/42 02/03/25 1222   Temp 35.8 °C (96.4 °F) 02/03/25 1206   Pulse 71 02/03/25 1230   Resp 18 02/03/25 1230   SpO2 100 % 02/03/25 1230   Vitals shown include unfiled device data.      No case tracking events are documented in the log.      Pain/Imtiaz Score: Imtiaz Score: 9 (2/3/2025 12:10 PM)

## 2025-02-03 NOTE — PROGRESS NOTES
[facility]  Hospital Medicine Progress Note        Chief Complaint: Inpatient Follow-up     HPI:   79-year-old male with history of previous CVA, end-stage renal disease on hemodialysis, dementia, hyperlipidemia, hypertension, and Parkinson's.  Was admitted to the ER on 02/01/2025 complaint of abnormal labs with significant anemia.  Denies any melena or hematemesis.  In the emergency room he was found to have a hemoglobin of 8.7.  Was transfused secondary to a drop in hemoglobin to 7.4.  GI was consulted.  Tentatively planning on EGD on 02/03.  Nephrology consulted for management of his hemodialysis needs.  Vital signs remained stable.  He is admitted to the hospitalist group    Interval Hx:  No significant issues overnight.  NPO for scoped this morning.  Hemoglobin 7.7.  Vital signs stable.  No hypo tension or tachycardia    Objective/physical exam:  General: In no acute distress, afebrile  Chest: Clear to auscultation bilaterally  Heart: RRR, +S1, S2, no appreciable murmur  Abdomen: Soft, nontender, BS +  MSK: Warm, no lower extremity edema, no clubbing or cyanosis  Neurologic: Alert and oriented x4, Cranial nerve II-XII intact, Strength 5/5 in all 4 extremities    VITAL SIGNS: 24 HRS MIN & MAX LAST   Temp  Min: 97.4 °F (36.3 °C)  Max: 98.3 °F (36.8 °C) 97.5 °F (36.4 °C)   BP  Min: 100/60  Max: 118/62 100/60   Pulse  Min: 66  Max: 76  71   Resp  Min: 14  Max: 18 14   SpO2  Min: 94 %  Max: 97 % 95 %       Recent Labs   Lab 02/01/25  0619 02/01/25  1540 02/02/25  0525 02/02/25  0720 02/03/25  0349   WBC 7.18  --  6.28  --  6.03   RBC 2.76*  --  2.31*  --  2.47*   HGB 8.7*   < > 7.4* 8.1* 7.7*   HCT 27.3*   < > 23.3* 25.5* 24.7*   MCV 98.9*  --  100.9*  --  100.0*   MCH 31.5*  --  32.0*  --  31.2*   MCHC 31.9*  --  31.8*  --  31.2*   RDW 18.1*  --  18.2*  --  17.9*     --  205  --  223   MPV 8.8  --  8.3  --  8.2    < > = values in this interval not displayed.       Recent Labs    Lab 02/01/25  0619 02/02/25  0525 02/03/25  0349    139 136   K 4.9 3.5 5.0   CL 97* 112* 101   CO2 28 20* 24   BUN 30.1* 30.9* 49.2*   CREATININE 6.38* 6.07* 9.95*   CALCIUM 9.5 7.1* 9.3   MG  --  2.40  --    ALBUMIN 2.9* 2.1* 2.7*   ALKPHOS 75 59 78   ALT 16 9 10   AST 18 8 9   BILITOT 0.4 0.3 0.4          Microbiology Results (last 7 days)       ** No results found for the last 168 hours. **             Radiology:  Cardiac catheterization  Procedure performed in the Invasive Lab    - See Procedure Log link below for nursing documentation    - See OpNote on Surgeries Tab for physician findings    - See Imaging Tab for radiologist dictation        Medications:  Scheduled Meds:   atorvastatin  40 mg Oral Nightly    carvediloL  3.125 mg Oral BID WM    cinacalcet  30 mg Oral Every other day    levETIRAcetam  500 mg Oral BID    memantine  10 mg Oral Daily    pantoprazole  40 mg Intravenous BID    sertraline  25 mg Oral Daily    tamsulosin  0.8 mg Oral QHS    trazodone  25 mg Oral QHS     Continuous Infusions:  PRN Meds:.  Current Facility-Administered Medications:     0.9%  NaCl infusion (for blood administration), , Intravenous, Q24H PRN    0.9%  NaCl infusion (for blood administration), , Intravenous, Q24H PRN    acetaminophen, 650 mg, Oral, Q8H PRN    melatonin, 6 mg, Oral, Nightly PRN    ondansetron, 4 mg, Intravenous, Q8H PRN    sodium chloride 0.9%, 10 mL, Intravenous, PRN    Nutrition:  Nutrition consulted. Most recent weight and BMI monitored-     Measurements:  Wt Readings from Last 1 Encounters:   02/01/25 79.4 kg (175 lb)   Body mass index is 21.87 kg/m².    Patient has been screened and assessed by RD.    Malnutrition Type:  Context:    Level:      Malnutrition Characteristic Summary:       Interventions/Recommendations (treatment strategy):           Assessment/Plan:   Suspected chronic GI bleed   Acute on chronic anemia, inflammatory   End-stage renal disease on hemodialysis   Chronic  dementia/Parkinson's     Status post transfusion of 1 unit packed red blood cell since admission.    GI following and plans on EGD this morning.    He is not no evidence of acute brisk bleeding.    Iron profile shows inflammatory anemia.  Suspect this is secondary to his chronic kidney disease.  May benefit from some Epogen.    Hemoglobin 7.7 this morning.  Hold off on transfusing for now.    hemodialysis per Nephrology      Luis Craig MD   02/03/2025     All diagnosis and differential diagnosis have been reviewed; assessment and plan has been documented; I have personally reviewed the labs and test results that are presently available; I have reviewed the patients medication list; I have reviewed the consulting providers response and recommendations. I have reviewed or attempted to review medical records based upon their availability    All of the patient's questions have been  addressed and answered. Patient's is agreeable to the above stated plan. I will continue to monitor closely and make adjustments to medical management as needed.  _____________________________________________________________________

## 2025-02-03 NOTE — ANESTHESIA PREPROCEDURE EVALUATION
"                                                                                                             02/03/2025  Angel Pablo Sr. is a 79 y.o., male who presents with ESRD w/ need for   Hemodialysis access(Permanent). He currently has access, AV Fistula, which needs revision.  Diagnosis:        Mechanical breakdown of surgically created arteriovenous fistula, subsequent encounter [T82.510D]       End stage kidney disease [N18.6]    The pt. comes to [facility] for the noted procedure under [facility]Supraclavicular Blk) w/   IV sedation vs GA/LMA.  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   Insomnia Pain in left knee   Presbyopia Repeated falls   Muscle wasting and atrophy, not elsewhere classified, multiple sites Paralytic gait   Other lack of coordination Presence of intraocular lens   Hyperlipidemia Seizures   Insomnia Hypokalemia   H/O prostate cancer      Surgical History:  KNEE SURGERY COLONOSCOPY   FISTULOGRAM            Vital signs:  Pre Vitals     Current as of 05/03/23 0904  BP: 113/73 Pulse: 75   Resp: 16 SpO2: 97   Temp: 36.8 °C (98.3 °F)   Height: 6' 1" (1.854 m) (04/21/23) Weight: 79.2 kg (174 lb 9.6 oz) (04/21/23)   BMI: 23 IBW: 79.9 kg (176 lb 1.7 oz)   Last edited 05/03/23 0819 by GAMAL          Lab Data:      EKG:    Pre-op Assessment    I have reviewed the Patient Summary Reports.     I have reviewed the Nursing Notes. I have reviewed the NPO Status.   I have reviewed the Medications. "     Review of Systems  Anesthesia Hx:  No problems with previous Anesthesia                Social:  Non-Smoker       Hematology/Oncology:  Hematology Normal   Oncology Normal                                   EENT/Dental:  EENT/Dental Normal           Cardiovascular:  Exercise tolerance: good   Hypertension                  Functional Capacity good / => 4 METS                         Pulmonary:  Pulmonary Normal                       Renal/:  Chronic Renal Disease, ESRD, Dialysis                Hepatic/GI:  Hepatic/GI Normal                    Musculoskeletal:  Musculoskeletal Normal                Neurological:   CVA Neuromuscular Disease,   Seizures                                Endocrine:  Endocrine Normal            Dermatological:  Skin Normal    Psych:  Psychiatric History  depression                Physical Exam  General: Alert, Oriented, Well nourished and Cooperative    Airway:  Mallampati: II   Mouth Opening: Normal  TM Distance: Normal  Tongue: Normal  Neck ROM: Normal ROM    Dental:  Intact    Chest/Lungs:  Clear to auscultation, Normal Respiratory Rate    Heart:  Rate: Normal  Rhythm: Regular Rhythm        Anesthesia Plan  Type of Anesthesia, risks & benefits discussed:    Anesthesia Type: Gen Natural Airway, MAC  Intra-op Monitoring Plan: Standard ASA Monitors  Induction:  IV  Informed Consent: Informed consent signed with the Patient and all parties understand the risks and agree with anesthesia plan.  All questions answered.   ASA Score: 4  Day of Surgery Review of History & Physical: H&P Update referred to the surgeon/provider.    Ready For Surgery From Anesthesia Perspective.     .     Latest Reference Range & Units 02/03/25 03:49   WBC 4.50 - 11.50 x10(3)/mcL 6.03   RBC 4.70 - 6.10 x10(6)/mcL 2.47 (L)   Hemoglobin 14.0 - 18.0 g/dL 7.7 (L)   Hematocrit 42.0 - 52.0 % 24.7 (L)   MCV 80.0 - 94.0 fL 100.0 (H)   MCH 27.0 - 31.0 pg 31.2 (H)   MCHC 33.0 - 36.0 g/dL 31.2 (L)   RDW 11.5 - 17.0 % 17.9 (H)    Platelet Count 130 - 400 x10(3)/mcL 223   (L): Data is abnormally low  (H): Data is abnormally high

## 2025-02-03 NOTE — PROGRESS NOTES
Nephrology following for ESRD management.     Exam:  Gen: Chronically ill appearing AAM  Ext: No LE edema  HD access:  LUE AVF      Plan:  Seen on hemodialysis 2:35 p.m..  0.5 L UF as tolerated.    Trevor Thayer DO  Nephrology  [facility]  Clinic number: [phone]      Note was created with the assistance of electronic Dictation Services.  Note was reviewed to decrease errors, however, these may still be present.  Please contact me about questions or concerns with the dictation.

## 2025-02-03 NOTE — PLAN OF CARE
02/03/25 1507   Discharge Assessment   Assessment Type Discharge Planning Assessment   Confirmed/corrected address, phone number and insurance Yes   Source of Information other (see comments)  (medical record)   If unable to respond/provide information was family/caregiver contacted? Yes  (No answer)   Contact Name/Number Renetta Bellamy [phone]   Communicated ROSA with patient/caregiver Date not available/Unable to determine   Reason For Admission Gi Bleed, anemia   Do you expect to return to your current living situation? Yes   Do you have help at home or someone to help you manage your care at home? Yes   Who are your caregiver(s) and their phone number(s)? Sister Renetta Bellamy [phone]   Prior to hospitilization cognitive status: Unable to Assess   Readmission within 30 days? No   Do you take prescription medications? Yes   Do you have prescription coverage? Yes   Coverage Medicare   Do you have any problems affording any of your prescribed medications? No   Is the patient taking medications as prescribed? yes   Who is going to help you get home at discharge? [facility] or Nursing home transportation   Are you on dialysis? Yes   Dialysis Name and Scheduled days [facility].   Do you take coumadin? No   Discharge Plan A Return to nursing home   Discharge Plan B Skilled Nursing Facility   DME Needed Upon Discharge  other (see comments)  (TBD)     Patient admitted for GI bleed and anemia.  Currently a resident of [facility] in [place] [phone].  Patient is a poor historian.  Attempted to call sister - no answer.  Dialyzes MWF at [facility] on [facility] Will follow.

## 2025-02-03 NOTE — PROGRESS NOTES
Inpatient Nutrition Evaluation    Admit Date: 2/1/2025   Total duration of encounter: 2 days    Nutrition Recommendation/Prescription     Resume oral diet as medically appropriate; goal diet Renal    Nutrition Assessment     Chart Review    Reason Seen: malnutrition screening tool (MST)    Malnutrition Screening Tool Results   Have you recently lost weight without trying?: Yes: Unsure how much  Have you been eating poorly because of a decreased appetite?: No   MST Score: 2     Diagnosis:   ESRD MWF  Acute on chronic anemia   Gastrointestinal hemorrhage    Relevant Medical History: cva, esrd hd, dementia, hld, htn, parkinson.     Nutrition-Related Medications: Scheduled Medications:  atorvastatin, 40 mg, Nightly  carvediloL, 3.125 mg, BID WM  cinacalcet, 30 mg, Every other day  levETIRAcetam, 500 mg, BID  LIDOcaine (PF) 20 mg/mL (2%), ,   memantine, 10 mg, Daily  pantoprazole, 40 mg, BID  sertraline, 25 mg, Daily  sodium,potassium,mag sulfates, 1 Bottle, BID  tamsulosin, 0.8 mg, QHS  trazodone, 25 mg, QHS    Continuous Infusions:   PRN Medications:    atorvastatin tablet 40 mg    carvediloL tablet 3.125 mg    cinacalcet tablet 30 mg    levETIRAcetam tablet 500 mg    LIDOcaine (PF) 20 mg/mL (2%) 20 mg/mL (2 %) injection    memantine tablet 10 mg    pantoprazole injection 40 mg    sertraline tablet 25 mg    sodium,potassium,mag sulfates 17.5-3.13-1.6 gram SolR 177 mL    tamsulosin 24 hr capsule 0.8 mg    trazodone split tablet 25 mg        Nutrition-Related Labs:  Recent Labs   Lab 02/01/25  0104 02/01/25  0619 02/01/25  1540 02/02/25  0525 02/02/25  0720 02/03/25  0349    136  --  139  --  136   K 4.5 4.9  --  3.5  --  5.0   CALCIUM 9.9 9.5  --  7.1*  --  9.3   PHOS  --   --   --  3.5  --   --    MG  --   --   --  2.40  --   --    CL 97* 97*  --  112*  --  101   CO2 29 28  --  20*  --  24   BUN 28.7* 30.1*  --  30.9*  --  49.2*   CREATININE 6.30* 6.38*  --  6.07*  --  9.95*   EGFRNORACEVR 8 8  --  9  --  5  "  GLUCOSE 104 89  --  68*  --  77*   BILITOT 0.3 0.4  --  0.3  --  0.4   ALKPHOS 83 75  --  59  --  78   ALT 15 16  --  9  --  10   AST 14 18  --  8  --  9   ALBUMIN 3.0* 2.9*  --  2.1*  --  2.7*   WBC 6.85 7.18  --  6.28  --  6.03   HGB 7.3* 8.7* 8.6* 7.4* 8.1* 7.7*   HCT 23.9* 27.3* 26.9* 23.3* 25.5* 24.7*        Diet Order: Diet Clear Liquid  Diet NPO Except for: Medication, Sips with Medication  Oral Supplement Order: none  Appetite/Oral Intake: fair/not applicable  Factors Affecting Nutritional Intake: clear liquid diet  Food/Gnosticism/Cultural Preferences: unable to obtain  Food Allergies: no known food allergies       Wound(s):   none noted    Comments    2/3/25 pt NPO for EGD, out of room for procedure; weight loss had been reported on admit, will attempt early f/u    Anthropometrics    Height: 6' 3" (190.5 cm)    Last Weight: 79.4 kg (175 lb) (02/01/25 0031)    BMI (Calculated): 21.9  BMI Classification: underweight (BMI less than 22 if >65 years of age)        Ideal Body Weight (IBW), Male: 196 lb     % Ideal Body Weight, Male (lb): 89.29 %                          Usual Weight Provided By: EMR weight history    Wt Readings from Last 5 Encounters:   02/01/25 79.4 kg (175 lb)   12/27/24 79.2 kg (174 lb 9.7 oz)   09/28/23 86.2 kg (190 lb 0.6 oz)   08/22/23 87.4 kg (192 lb 10.9 oz)   07/03/23 81 kg (178 lb 9.2 oz)     Weight Change(s) Since Admission:  Admit Weight: 79.4 kg (175 lb) (02/01/25 0031)      Patient Education    Not applicable.    Monitoring & Evaluation     Dietitian will monitor food and beverage intake, electrolyte/renal panel, and gastrointestinal profile.  Nutrition Risk/Follow-Up: low (follow-up in 5-7 days)  Patients assigned 'low nutrition risk' status do not qualify for a full nutritional assessment but will be monitored and re-evaluated in a 5-7 day time period. Please consult if re-evaluation needed sooner.   "

## 2025-02-03 NOTE — NURSING
02/03/25 1705   Post-Hemodialysis Assessment   Blood Volume Processed (Liters) 55.5 L   Dialyzer Clearance Clear   Duration of Treatment 180 minutes   Total UF (mL) 500 mL   Patient Response to Treatment pt tolerated tx well   Post-Hemodialysis Comments NAD noted, VSS, Net  mL. Last /82 and HR 73.

## 2025-02-03 NOTE — TRANSFER OF CARE
"Anesthesia Transfer of Care Note    Patient: Angel Pablo Sr.    Procedure(s) Performed: Procedure(s) (LRB):  EGD (N/A)    Patient location: GI    Anesthesia Type: MAC    Transport from OR: Transported from OR on room air with adequate spontaneous ventilation    Post pain: adequate analgesia    Post assessment: no apparent anesthetic complications    Post vital signs: stable    Level of consciousness: sedated    Nausea/Vomiting: no nausea/vomiting    Complications: none    Transfer of care protocol was followed    Last vitals: Visit Vitals  BP (!) 92/56 (BP Location: Right arm, Patient Position: Lying)   Pulse 78   Temp 36.5 °C (97.7 °F) (Skin)   Resp 15   Ht 6' 3" (1.905 m)   Wt 79.4 kg (175 lb)   SpO2 100%   BMI 21.87 kg/m²     "

## 2025-02-03 NOTE — PROGRESS NOTES
Angel Pablo Sr.   MRN: [id number]   ADMISSION DATE: 2025  : 1945  AGE: 79 y.o.    DATE :  2025       PROVIDER: RADHA RUELAS      SUBJECTIVE:  Feels okay.  Denies any significant GI issues.      Review of Systems   Seems to be more awake today.  No overt GI bleed.  No nausea or vomiting.  No abdominal pain.  No fever or chills.  No significant cardiopulmonary symptoms.      Review of patient's allergies indicates:  No Known Allergies      atorvastatin  40 mg Oral Nightly    carvediloL  3.125 mg Oral BID WM    cinacalcet  30 mg Oral Every other day    levETIRAcetam  500 mg Oral BID    memantine  10 mg Oral Daily    pantoprazole  40 mg Intravenous BID    sertraline  25 mg Oral Daily    tamsulosin  0.8 mg Oral QHS    trazodone  25 mg Oral QHS       Medications Discontinued During This Encounter   Medication Reason    aspirin (ECOTRIN) 81 MG EC tablet Error    potassium chloride (KLOR-CON) 10 MEQ TbSR Error    zinc gluconate 50 mg tablet Error    0.9% NaCl infusion     pantoprazole (PROTONIX) 40 mg in 0.9% NaCl 100 mL IVPB (MB+)              Past Medical History:   Diagnosis Date    Benign prostatic hyperplasia with lower urinary tract symptoms     Carotid artery stenosis     Dementia     Dialysis patient     M/W/F    End stage renal disease     H/O prostate cancer     Hemiplegia and hemiparesis following cerebral infarction affecting left dominant side     Hyperlipidemia     Hypertension     Hypokalemia     Insomnia     Insomnia     Mechanical breakdown of surgically created arteriovenous fistula, subsequent encounter     Muscle wasting and atrophy, not elsewhere classified, multiple sites     Other lack of coordination     Pain in left knee     Paralytic gait     Presbyopia     Presence of intraocular lens     Repeated falls     Seizures     Stroke       Social History     Socioeconomic History    Marital status: Single   Tobacco Use    Smoking status: Never    Smokeless tobacco: Never   Substance  and Sexual Activity    Alcohol use: Not Currently    Drug use: Never    Sexual activity: Not Currently   Social History Narrative    ** Merged History Encounter **          Social Drivers of Health     Financial Resource Strain: Patient Declined (2/1/2025)    Overall Financial Resource Strain (CARDIA)     Difficulty of Paying Living Expenses: Patient declined   Food Insecurity: Patient Declined (2/1/2025)    Hunger Vital Sign     Worried About Running Out of Food in the Last Year: Patient declined     Ran Out of Food in the Last Year: Patient declined   Transportation Needs: Patient Declined (2/1/2025)    TRANSPORTATION NEEDS     Transportation : Patient declined   Stress: Patient Declined (2/1/2025)    [employer] [place] [employer] - Occupational Stress Questionnaire     Feeling of Stress : Patient declined   Housing Stability: Patient Declined (2/1/2025)    Housing Stability Vital Sign     Unable to Pay for Housing in the Last Year: Patient declined     Homeless in the Last Year: Patient declined      Past Surgical History:   Procedure Laterality Date    COLONOSCOPY      EXCISION OF ARTERIOVENOUS FISTULA Left 05/03/2023    Procedure: EXCISION, AV FISTULA;  Surgeon: Natalie Delatorre MD;  Location: [facility];  Service: Peripheral Vascular;  Laterality: Left;  LEFT UPPER ARM FISTULA REVISION WITH TUNNELED CATHETER INSERTION // VASCULAR // SUPINE // SUPRACLAVICULAR BLOCK    FISTULOGRAM N/A 04/20/2023    Procedure: Fistulogram;  Surgeon: Akil Escobar DO;  Location: [facility] CATH LAB;  Service: Nephrology;  Laterality: N/A;  LUE FISTULOGRAM    FISTULOGRAM Left 08/22/2023    Dr. Escobar    FISTULOGRAM Left 8/22/2023    Procedure: Fistulogram;  Surgeon: Akil Escobar DO;  Location: [facility] CATH LAB;  Service: Nephrology;  Laterality: Left;    FISTULOGRAM Left 9/28/2023    Procedure: Fistulogram;  Surgeon: Akil Escobar DO;  Location: [facility] CATH LAB;  Service: Nephrology;  Laterality: Left;    INSERTION, VASCULAR  ACCESS CATHETER N/A 05/03/2023    Procedure: INSERTION, VASCULAR ACCESS CATHETER;  Surgeon: Natalie Delatorre MD;  Location: [facility] OR;  Service: Peripheral Vascular;  Laterality: N/A;    KNEE SURGERY Left     REMOVAL OF TUNNELED CENTRAL VENOUS CATHETER (CVC) N/A 9/28/2023    Procedure: REMOVAL, CATHETER, CENTRAL VENOUS, TUNNELED;  Surgeon: Akil Escobar DO;  Location: [facility] CATH LAB;  Service: Nephrology;  Laterality: N/A;    REMOVAL OF TUNNELED CENTRAL VENOUS CATHETER (CVC) N/A 10/31/2023    Procedure: REMOVAL, CATHETER, CENTRAL VENOUS, TUNNELED;  Surgeon: Akil Escobar DO;  Location: [facility] CATH LAB;  Service: Nephrology;  Laterality: N/A;    THROMBECTOMY Left 12/27/2024    Procedure: THROMBECTOMY;  Surgeon: Akil Escobar DO;  Location: [facility] CATH LAB;  Service: Nephrology;  Laterality: Left;    TRANSPOSITION OF BASILIC VEIN Left 7/3/2023    Procedure: TRANSPOSITION, VEIN, BASILIC;  Surgeon: Natalie Delatorre MD;  Location: [facility] OR;  Service: Peripheral Vascular;  Laterality: Left;  left radiocephalic vs basilic fistula creation, supraclavicular block // XX        OBJECTIVE:     Vitals:    02/02/25 1601 02/02/25 1947 02/02/25 2030 02/02/25 2330   BP: 118/62 114/61  (!) 102/50   Pulse: 72 74 72 76   Resp: 18 18  16   Temp: 97.4 °F (36.3 °C) 97.5 °F (36.4 °C)  98.2 °F (36.8 °C)   TempSrc: Oral Oral  Oral   SpO2: (!) 94% 97%  96%   Weight:       Height:           Physical Exam   GENERAL: In no acute distress, afebrile  HEENT:  CHEST: Clear to auscultation bilaterally  HEART: S1, S2, no appreciable murmur  ABDOMEN: Soft, nontender, BS +  MSK: Warm, no lower extremity edema, no clubbing or cyanosis  NEUROLOGIC: Alert and oriented x4, moving all extremities with good strength     LABS    Recent Labs   Lab 02/01/25  0104 02/01/25  0619 02/01/25  1540 02/02/25  0525 02/02/25  0720   WBC 6.85 7.18  --  6.28  --    HGB 7.3* 8.7* 8.6* 7.4* 8.1*   HCT 23.9* 27.3* 26.9* 23.3* 25.5*    204  --  205  --       Recent  "Labs   Lab 02/01/25  0104 02/01/25  0619 02/02/25  0525    136 139   K 4.5 4.9 3.5   CL 97* 97* 112*   CO2 29 28 20*   BUN 28.7* 30.1* 30.9*   CREATININE 6.30* 6.38* 6.07*   CALCIUM 9.9 9.5 7.1*   BILITOT 0.3 0.4 0.3   ALKPHOS 83 75 59   ALT 15 16 9   AST 14 18 8   GLUCOSE 104 89 68*      Recent Labs   Lab 02/01/25  0104   INR 1.1    No results for input(s): "AMYLASE" in the last 168 hours.   Recent Labs   Lab 02/01/25 0104   APTT 30.7   INR 1.1           RESULTS: No results found.           ICD-10-CM ICD-9-CM   1. ESRD (end stage renal disease)  N18.6 585.6   2. Anemia  D64.9 285.9   3. Gastrointestinal hemorrhage, unspecified gastrointestinal hemorrhage type  K92.2 578.9   4. Acute on chronic anemia  D64.9 285.9          ASSESSMENT & PLAN:     ESRD MWF  Acute on chronic anemia   Gastrointestinal hemorrhage     79-year-old male with a history of ESRD on dialysis Monday Wednesday Friday presents ED for evaluation of abnormal labs. He had labs performed yesterday with a low H&H. He denies any complaints at this time. He states he dialyze yesterday.      Recommendations:   1. Follow up hemoglobin/hematocrit.  No evidence of overt GI blood loss.  EGD can be done possibly on Monday if continues to remain stable.  It can be done earlier if necessary pending course in hospital.   NPO after midnight on Sunday  2. Need to clarify in regards to his last colonoscopy.  He is reported to have a colonoscopy in 2023.   3. Patient can be on clear liquid diet.  Follow up hemoglobin/hematocrit.  If hemoglobin below 7, transfuse additional packed RBC.    4. Patient is being followed up by Nephrology service.      2/2/25  Patient seems to be doing well from GI standpoint.  No overt GI bleed.  For possible dialysis tomorrow since he was not dialyzed over the weekend.  NPO after midnight for possible EGD tomorrow.  Need to clarify regarding his primary gastroenterologist.  "

## 2025-02-04 ENCOUNTER — ANESTHESIA EVENT (OUTPATIENT)
Dept: ENDOSCOPY | Facility: HOSPITAL | Age: 80
DRG: 682 | End: 2025-02-04
Payer: MEDICARE

## 2025-02-04 ENCOUNTER — ANESTHESIA (OUTPATIENT)
Dept: ENDOSCOPY | Facility: HOSPITAL | Age: 80
DRG: 682 | End: 2025-02-04
Payer: MEDICARE

## 2025-02-04 LAB
ANION GAP SERPL CALC-SCNC: 18 MEQ/L
ANION GAP SERPL CALC-SCNC: 18 MMOL/L (ref 8–16)
BASOPHILS # BLD AUTO: 0.03 X10(3)/MCL
BASOPHILS NFR BLD AUTO: 0.4 %
BUN SERPL-MCNC: 27 MG/DL (ref 6–30)
BUN SERPL-MCNC: 29.5 MG/DL (ref 8.4–25.7)
CALCIUM SERPL-MCNC: 8.9 MG/DL (ref 8.8–10)
CHLORIDE SERPL-SCNC: 96 MMOL/L (ref 95–110)
CHLORIDE SERPL-SCNC: 96 MMOL/L (ref 98–107)
CO2 SERPL-SCNC: 26 MMOL/L (ref 23–31)
CREAT SERPL-MCNC: 6.64 MG/DL (ref 0.72–1.25)
CREAT SERPL-MCNC: 6.8 MG/DL (ref 0.5–1.4)
CREAT/UREA NIT SERPL: 4
EOSINOPHIL # BLD AUTO: 0.07 X10(3)/MCL (ref 0–0.9)
EOSINOPHIL NFR BLD AUTO: 0.8 %
ERYTHROCYTE [DISTWIDTH] IN BLOOD BY AUTOMATED COUNT: 18.4 % (ref 11.5–17)
GFR SERPLBLD CREATININE-BSD FMLA CKD-EPI: 8 ML/MIN/1.73/M2
GLUCOSE SERPL-MCNC: 80 MG/DL (ref 82–115)
GLUCOSE SERPL-MCNC: 86 MG/DL (ref 70–110)
HBV SURFACE AB SER QL IA: POSITIVE
HBV SURFACE AB SERPL IA-ACNC: >1000 MIU/ML
HCT VFR BLD AUTO: 26.4 % (ref 42–52)
HCT VFR BLD CALC: 27 %PCV (ref 36–54)
HGB BLD-MCNC: 8.6 G/DL (ref 14–18)
HGB BLD-MCNC: 9 G/DL
IMM GRANULOCYTES # BLD AUTO: 0.04 X10(3)/MCL (ref 0–0.04)
IMM GRANULOCYTES NFR BLD AUTO: 0.5 %
LYMPHOCYTES # BLD AUTO: 0.99 X10(3)/MCL (ref 0.6–4.6)
LYMPHOCYTES NFR BLD AUTO: 11.7 %
MCH RBC QN AUTO: 31.9 PG (ref 27–31)
MCHC RBC AUTO-ENTMCNC: 32.6 G/DL (ref 33–36)
MCV RBC AUTO: 97.8 FL (ref 80–94)
MONOCYTES # BLD AUTO: 0.6 X10(3)/MCL (ref 0.1–1.3)
MONOCYTES NFR BLD AUTO: 7.1 %
NEUTROPHILS # BLD AUTO: 6.72 X10(3)/MCL (ref 2.1–9.2)
NEUTROPHILS NFR BLD AUTO: 79.5 %
NRBC BLD AUTO-RTO: 0 %
PLATELET # BLD AUTO: 124 X10(3)/MCL (ref 130–400)
PLATELETS.RETICULATED NFR BLD AUTO: 8.6 % (ref 0.9–11.2)
PMV BLD AUTO: 10.8 FL (ref 7.4–10.4)
POC IONIZED CALCIUM: 1.16 MMOL/L (ref 1.06–1.42)
POC TCO2 (MEASURED): 30 MMOL/L (ref 23–29)
POTASSIUM BLD-SCNC: 4.2 MMOL/L (ref 3.5–5.1)
POTASSIUM SERPL-SCNC: 5.6 MMOL/L (ref 3.5–5.1)
PSYCHE PATHOLOGY RESULT: NORMAL
RBC # BLD AUTO: 2.7 X10(6)/MCL (ref 4.7–6.1)
SAMPLE: ABNORMAL
SODIUM BLD-SCNC: 139 MMOL/L (ref 136–145)
SODIUM SERPL-SCNC: 140 MMOL/L (ref 136–145)
WBC # BLD AUTO: 8.45 X10(3)/MCL (ref 4.5–11.5)

## 2025-02-04 PROCEDURE — D9220A PRA ANESTHESIA: Mod: ANES,,, | Performed by: ANESTHESIOLOGY

## 2025-02-04 PROCEDURE — 25000003 PHARM REV CODE 250: Performed by: NURSE ANESTHETIST, CERTIFIED REGISTERED

## 2025-02-04 PROCEDURE — 37000009 HC ANESTHESIA EA ADD 15 MINS: Performed by: INTERNAL MEDICINE

## 2025-02-04 PROCEDURE — 63600175 PHARM REV CODE 636 W HCPCS: Performed by: NURSE ANESTHETIST, CERTIFIED REGISTERED

## 2025-02-04 PROCEDURE — 27201423 OPTIME MED/SURG SUP & DEVICES STERILE SUPPLY: Performed by: INTERNAL MEDICINE

## 2025-02-04 PROCEDURE — 37000008 HC ANESTHESIA 1ST 15 MINUTES: Performed by: INTERNAL MEDICINE

## 2025-02-04 PROCEDURE — 0DBL8ZX EXCISION OF TRANSVERSE COLON, VIA NATURAL OR ARTIFICIAL OPENING ENDOSCOPIC, DIAGNOSTIC: ICD-10-PCS | Performed by: INTERNAL MEDICINE

## 2025-02-04 PROCEDURE — 45385 COLONOSCOPY W/LESION REMOVAL: CPT | Performed by: INTERNAL MEDICINE

## 2025-02-04 PROCEDURE — 25000003 PHARM REV CODE 250: Performed by: INTERNAL MEDICINE

## 2025-02-04 PROCEDURE — D9220A PRA ANESTHESIA: Mod: CRNA,,, | Performed by: NURSE ANESTHETIST, CERTIFIED REGISTERED

## 2025-02-04 PROCEDURE — 88305 TISSUE EXAM BY PATHOLOGIST: CPT | Performed by: INTERNAL MEDICINE

## 2025-02-04 PROCEDURE — 43239 EGD BIOPSY SINGLE/MULTIPLE: CPT | Performed by: INTERNAL MEDICINE

## 2025-02-04 PROCEDURE — 63600175 PHARM REV CODE 636 W HCPCS

## 2025-02-04 PROCEDURE — 85025 COMPLETE CBC W/AUTO DIFF WBC: CPT | Performed by: HOSPITALIST

## 2025-02-04 PROCEDURE — 80048 BASIC METABOLIC PNL TOTAL CA: CPT | Performed by: HOSPITALIST

## 2025-02-04 PROCEDURE — 21400001 HC TELEMETRY ROOM

## 2025-02-04 RX ORDER — PROPOFOL 10 MG/ML
VIAL (ML) INTRAVENOUS CONTINUOUS PRN
Status: DISCONTINUED | OUTPATIENT
Start: 2025-02-04 | End: 2025-02-04

## 2025-02-04 RX ORDER — LIDOCAINE HYDROCHLORIDE 20 MG/ML
INJECTION INTRAVENOUS
Status: DISCONTINUED | OUTPATIENT
Start: 2025-02-04 | End: 2025-02-04

## 2025-02-04 RX ORDER — PHENYLEPHRINE HCL IN 0.9% NACL 1 MG/10 ML
SYRINGE (ML) INTRAVENOUS
Status: DISPENSED
Start: 2025-02-04 | End: 2025-02-05

## 2025-02-04 RX ORDER — PHENYLEPHRINE HCL IN 0.9% NACL 1 MG/10 ML
SYRINGE (ML) INTRAVENOUS
Status: DISCONTINUED | OUTPATIENT
Start: 2025-02-04 | End: 2025-02-04

## 2025-02-04 RX ORDER — PROPOFOL 10 MG/ML
VIAL (ML) INTRAVENOUS
Status: COMPLETED
Start: 2025-02-04 | End: 2025-02-04

## 2025-02-04 RX ADMIN — CARVEDILOL 3.12 MG: 3.12 TABLET, FILM COATED ORAL at 05:02

## 2025-02-04 RX ADMIN — PROPOFOL 20 MG: 10 INJECTION, EMULSION INTRAVENOUS at 12:02

## 2025-02-04 RX ADMIN — PROPOFOL 100 MCG/KG/MIN: 10 INJECTION, EMULSION INTRAVENOUS at 12:02

## 2025-02-04 RX ADMIN — SODIUM SULFATE, POTASSIUM SULFATE, MAGNESIUM SULFATE 177 ML: 17.5; 3.13; 1.6 SOLUTION, CONCENTRATE ORAL at 05:02

## 2025-02-04 RX ADMIN — Medication 100 MCG: at 12:02

## 2025-02-04 RX ADMIN — PROPOFOL 30 MG: 10 INJECTION, EMULSION INTRAVENOUS at 12:02

## 2025-02-04 RX ADMIN — Medication 200 MCG: at 12:02

## 2025-02-04 RX ADMIN — SERTRALINE HYDROCHLORIDE 25 MG: 25 TABLET ORAL at 02:02

## 2025-02-04 RX ADMIN — PANTOPRAZOLE SODIUM 40 MG: 40 INJECTION, POWDER, LYOPHILIZED, FOR SOLUTION INTRAVENOUS at 09:02

## 2025-02-04 RX ADMIN — LIDOCAINE HYDROCHLORIDE 40 MG: 20 INJECTION INTRAVENOUS at 12:02

## 2025-02-04 RX ADMIN — MEMANTINE 10 MG: 5 TABLET ORAL at 02:02

## 2025-02-04 RX ADMIN — LEVETIRACETAM 500 MG: 500 TABLET, FILM COATED ORAL at 08:02

## 2025-02-04 RX ADMIN — TAMSULOSIN HYDROCHLORIDE 0.8 MG: 0.4 CAPSULE ORAL at 08:02

## 2025-02-04 RX ADMIN — SODIUM CHLORIDE: 9 INJECTION, SOLUTION INTRAVENOUS at 12:02

## 2025-02-04 RX ADMIN — ATORVASTATIN CALCIUM 40 MG: 40 TABLET, FILM COATED ORAL at 08:02

## 2025-02-04 NOTE — PLAN OF CARE
Problem: Adult Inpatient Plan of Care  Goal: Plan of Care Review  Outcome: Not Progressing  Goal: Patient-Specific Goal (Individualized)  Outcome: Not Progressing  Goal: Absence of Hospital-Acquired Illness or Injury  Outcome: Not Progressing  Goal: Optimal Comfort and Wellbeing  Outcome: Not Progressing  Goal: Readiness for Transition of Care  Outcome: Not Progressing     Problem: Skin Injury Risk Increased  Goal: Skin Health and Integrity  Outcome: Not Progressing     Problem: Fall Injury Risk  Goal: Absence of Fall and Fall-Related Injury  Outcome: Not Progressing     Problem: Pain Acute  Goal: Optimal Pain Control and Function  Outcome: Not Progressing     Problem: Gastrointestinal Bleeding  Goal: Optimal Coping with Acute Illness  Outcome: Not Progressing  Goal: Hemostasis  Outcome: Not Progressing     Problem: Hemodialysis  Goal: Safe, Effective Therapy Delivery  Outcome: Not Progressing  Goal: Effective Tissue Perfusion  Outcome: Not Progressing  Goal: Absence of Infection Signs and Symptoms  Outcome: Not Progressing

## 2025-02-04 NOTE — ANESTHESIA POSTPROCEDURE EVALUATION
Anesthesia Post Evaluation    Patient: Angel Pablo SrSheng    Procedure(s) Performed: Procedure(s) (LRB):  COLONOSCOPY, WITH POLYPECTOMY USING HOT BIOPSY FORCEPS (N/A)    Final Anesthesia Type: general      Patient location during evaluation: GI PACU  Patient participation: Yes- Able to Participate  Level of consciousness: oriented and awake  Post-procedure vital signs: reviewed and stable  Pain management: adequate  Airway patency: patent    PONV status at discharge: No PONV  Anesthetic complications: no      Cardiovascular status: stable and hemodynamically stable  Respiratory status: spontaneous ventilation and unassisted  Hydration status: euvolemic  Follow-up not needed.  Comments: [facility]              Vitals Value Taken Time   /60 02/04/25 1327   Temp 35.9 °C (96.6 °F) 02/04/25 1307   Pulse 68 02/04/25 1328   Resp 11 02/04/25 1328   SpO2 100 % 02/04/25 1328   Vitals shown include unfiled device data.      No case tracking events are documented in the log.      Pain/Imtiaz Score: Imtiaz Score: 10 (2/4/2025  1:12 PM)

## 2025-02-04 NOTE — PROGRESS NOTES
Nephrology progress note  [facility]      Patient Name: Angel Pablo Sr.  Age: 79 y.o.  : 1945  MRN: [id number]  Admission Date: 2025        Angel Pablo Sr. is a 79 y.o. male with history of ESRD who dialyzes on MWF schedule at [facility] [place] in [place] under the care of Dr. Agosto via left upper arm fistula.  He was sent to the ED for evaluation after being found to have low hemoglobin on outpatient labs.  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  GI was consulted for further eval of GI bleed.  He was transfused 1 unit PRBCs.  Awaiting GI consult.  We are consulted for management of ESRD.    2025   Came back from GI procedure.  No complaints.  Ate liquid diet for lunch.  No chest pain no abdominal pain no shortness of breath.  No fever or chills.    PCP:  Prieto Vale MD    Review of patient's allergies indicates:  No Known Allergies    Current Facility-Administered Medications   Medication Dose Route Frequency Provider Last Rate Last Admin    0.9%  NaCl infusion (for blood administration)   Intravenous Q24H PRN Marilee Calvin MD        0.9%  NaCl infusion (for blood administration)   Intravenous Q24H PRN Bob Prater MD        acetaminophen tablet 650 mg  650 mg Oral Q8H PRN Fareed Beasley MD        atorvastatin tablet 40 mg  40 mg Oral Nightly Bob Prater MD   40 mg at 25    carvediloL tablet 3.125 mg  3.125 mg Oral BID WM Bob Prater MD   3.125 mg at 25 1903    cinacalcet tablet 30 mg  30 mg Oral Every other day Bob Prater MD   30 mg at 25 0900    levETIRAcetam tablet 500 mg  500 mg Oral BID Bob Prater MD   500 mg at 25    melatonin tablet 6 mg  6 mg Oral Nightly PRN Fareed Beasley MD        memantine tablet 10 mg  10 mg  Oral Daily Bob Prater MD   10 mg at 02/03/25 0833    ondansetron injection 4 mg  4 mg Intravenous Q8H PRN Fareed Beasley MD   4 mg at 02/03/25 1141    pantoprazole injection 40 mg  40 mg Intravenous BID Aleyda Thornton FNP   40 mg at 02/03/25 2026    PHENYLephrine HCl in 0.9% NaCl 1 mg/10 mL (100 mcg/mL) syringe             sertraline tablet 25 mg  25 mg Oral Daily Bob Prater MD   25 mg at 02/03/25 0833    sodium chloride 0.9% flush 10 mL  10 mL Intravenous PRN Fareed Beasley MD        tamsulosin 24 hr capsule 0.8 mg  0.8 mg Oral QHS Bob Prater MD   0.8 mg at 02/03/25 2119    trazodone split tablet 25 mg  25 mg Oral QHS Bob Prater MD   25 mg at 02/03/25 2119     Facility-Administered Medications Ordered in Other Encounters   Medication Dose Route Frequency Provider Last Rate Last Admin    sodium chloride 0.9% flush 10 mL  10 mL Intravenous PRN Sanjay Escobarir, DO        sodium chloride 0.9% flush 10 mL  10 mL Intravenous PRN Akil Escobar, DO         Medications Prior to Admission   Medication Sig Dispense Refill Last Dose/Taking    ascorbic acid, vitamin C, (VITAMIN C) 1000 MG tablet Take 1,000 mg by mouth once daily.   1/31/2025    atorvastatin (LIPITOR) 40 MG tablet Take 40 mg by mouth nightly.   1/31/2025    B complex-vitamin C-folic acid (MARIE-JAJA/NEPHRO-JAJA) 0.8 mg Tab Take 1 tablet by mouth once daily.   1/31/2025    carvediloL (COREG) 3.125 MG tablet Take 3.125 mg by mouth 2 (two) times daily with meals.   1/31/2025    folic acid (FOLVITE) 800 MCG Tab Take 800 mcg by mouth once daily.   1/31/2025    levETIRAcetam (KEPPRA) 500 MG Tab Take 500 mg by mouth 2 (two) times daily.   1/31/2025    magnesium hydroxide 400 mg/5 ml (MILK OF MAGNESIA) 400 mg/5 mL Susp Take 30 mLs by mouth daily as needed.   1/28/2025    melatonin (MELATIN) 5 mg Take 10 mg by mouth nightly as needed for Insomnia.   1/31/2025    memantine (NAMENDA) 10 MG Tab Take 10 mg by mouth 2 (two) times daily.   1/31/2025     mirabegron (MYRBETRIQ) 50 mg Tb24 Take 1 tablet by mouth once daily.   1/31/2025    polyethylene glycol (GLYCOLAX) 17 gram PwPk Take 17 g by mouth daily as needed for Constipation.   1/31/2025    sertraline (ZOLOFT) 25 MG tablet Take 25 mg by mouth once daily.   1/31/2025    tamsulosin (FLOMAX) 0.4 mg Cap Take 0.8 mg by mouth every evening.   1/31/2025    acetaminophen (TYLENOL) 650 MG TbSR Take 650 mg by mouth every 4 (four) hours as needed.       cinacalcet (SENSIPAR) 30 MG Tab Take 30 mg by mouth every other day.   1/30/2025    ondansetron (ZOFRAN-ODT) 4 MG TbDL Take 4 mg by mouth every 6 (six) hours as needed.   1/20/2025    trazodone (DESYREL) 25 MG tablet Take 25 mg by mouth every evening.          Past Medical History:  Past Medical History:   Diagnosis Date    Benign prostatic hyperplasia with lower urinary tract symptoms     Carotid artery stenosis     Dementia     Dialysis patient     M/W/F    End stage renal disease     H/O prostate cancer     Hemiplegia and hemiparesis following cerebral infarction affecting left dominant side     Hyperlipidemia     Hypertension     Hypokalemia     Insomnia     Insomnia     Mechanical breakdown of surgically created arteriovenous fistula, subsequent encounter     Muscle wasting and atrophy, not elsewhere classified, multiple sites     Other lack of coordination     Pain in left knee     Paralytic gait     Presbyopia     Presence of intraocular lens     Repeated falls     Seizures     Stroke        Past Surgical History:  Past Surgical History:   Procedure Laterality Date    COLONOSCOPY      EGD, WITH CLOSED BIOPSY N/A 2/3/2025    Procedure: EGD;  Surgeon: Singh Manning MD;  Location: [facility] ENDOSCOPY;  Service: Gastroenterology;  Laterality: N/A;    EXCISION OF ARTERIOVENOUS FISTULA Left 05/03/2023    Procedure: EXCISION, AV FISTULA;  Surgeon: Natalie Delatorre MD;  Location: [facility] OR;  Service: Peripheral Vascular;  Laterality: Left;  LEFT UPPER ARM FISTULA  REVISION WITH TUNNELED CATHETER INSERTION // VASCULAR // SUPINE // SUPRACLAVICULAR BLOCK    FISTULOGRAM N/A 04/20/2023    Procedure: Fistulogram;  Surgeon: Akil Escobar DO;  Location: [facility] CATH LAB;  Service: Nephrology;  Laterality: N/A;  LUE FISTULOGRAM    FISTULOGRAM Left 08/22/2023    Dr. Escobar    FISTULOGRAM Left 8/22/2023    Procedure: Fistulogram;  Surgeon: Akil Escobar DO;  Location: [facility] CATH LAB;  Service: Nephrology;  Laterality: Left;    FISTULOGRAM Left 9/28/2023    Procedure: Fistulogram;  Surgeon: Akil Escobar DO;  Location: [facility] CATH LAB;  Service: Nephrology;  Laterality: Left;    INSERTION, VASCULAR ACCESS CATHETER N/A 05/03/2023    Procedure: INSERTION, VASCULAR ACCESS CATHETER;  Surgeon: Natalie Delatorre MD;  Location: [facility];  Service: Peripheral Vascular;  Laterality: N/A;    KNEE SURGERY Left     REMOVAL OF TUNNELED CENTRAL VENOUS CATHETER (CVC) N/A 9/28/2023    Procedure: REMOVAL, CATHETER, CENTRAL VENOUS, TUNNELED;  Surgeon: Akil Escobar DO;  Location: [facility] CATH LAB;  Service: Nephrology;  Laterality: N/A;    REMOVAL OF TUNNELED CENTRAL VENOUS CATHETER (CVC) N/A 10/31/2023    Procedure: REMOVAL, CATHETER, CENTRAL VENOUS, TUNNELED;  Surgeon: Akil Escobar DO;  Location: [facility] CATH LAB;  Service: Nephrology;  Laterality: N/A;    THROMBECTOMY Left 12/27/2024    Procedure: THROMBECTOMY;  Surgeon: Akil Escobar DO;  Location: [facility] CATH LAB;  Service: Nephrology;  Laterality: Left;    TRANSPOSITION OF BASILIC VEIN Left 7/3/2023    Procedure: TRANSPOSITION, VEIN, BASILIC;  Surgeon: Natalie Delatorre MD;  Location: [facility] OR;  Service: Peripheral Vascular;  Laterality: Left;  left radiocephalic vs basilic fistula creation, supraclavicular block // XX       Family History:  Family History   Family history unknown: Yes       Social History:  Social History     Tobacco Use    Smoking status: Never    Smokeless tobacco: Never   Substance Use Topics    Alcohol use: Not Currently           Review of Systems:    Review of Systems   Eyes:  Negative for pain and discharge.   Respiratory: Negative.     Cardiovascular: Negative.  Negative for chest pain and leg swelling.   Gastrointestinal:  Negative for nausea and vomiting.   Hematological:  Negative for adenopathy. Does not bruise/bleed easily.   Psychiatric/Behavioral: Negative.         Objective:    VITAL SIGNS: 24 HR MIN & MAX LAST  Temp  Min: 96.6 °F (35.9 °C)  Max: 98.5 °F (36.9 °C) 97.7 °F (36.5 °C)  BP  Min: 98/49  Max: 125/68 125/68  Pulse  Min: 67  Max: 81 70  Resp  Min: 16  Max: 22 16  SpO2  Min: 94 %  Max: 100 % 98 %      Intake/Output Summary (Last 24 hours) at 2/4/2025 1431  Last data filed at 2/4/2025 1314  Gross per 24 hour   Intake 50 ml   Output 500 ml   Net -450 ml       Physical Exam  Constitutional:       Comments: Extremely thin   HENT:      Head: Normocephalic.      Right Ear: External ear normal.      Left Ear: External ear normal.      Nose: Nose normal.      Mouth/Throat:      Mouth: Mucous membranes are dry.   Eyes:      Extraocular Movements: Extraocular movements intact.      Pupils: Pupils are equal, round, and reactive to light.   Cardiovascular:      Rate and Rhythm: Normal rate and regular rhythm.   Pulmonary:      Effort: Pulmonary effort is normal.   Abdominal:      General: Abdomen is flat. Bowel sounds are normal.      Palpations: Abdomen is soft.   Musculoskeletal:      Cervical back: Normal range of motion.      Right lower leg: No edema.      Left lower leg: No edema.   Skin:     General: Skin is warm and dry.   Neurological:      Mental Status: He is alert and oriented to person, place, and time.      Comments: Left hemiparesis   Psychiatric:         Mood and Affect: Mood normal.         Behavior: Behavior normal.       Dialysis Access: left upper arm AVF    Recent Results (from the past 24 hours)   POCT glucose    Collection Time: 02/03/25  4:25 PM   Result Value Ref Range    POCT Glucose 105 70 - 110 mg/dL    Basic Metabolic Panel    Collection Time: 02/04/25 10:31 AM   Result Value Ref Range    Sodium 140 136 - 145 mmol/L    Potassium 5.6 (H) 3.5 - 5.1 mmol/L    Chloride 96 (L) 98 - 107 mmol/L    CO2 26 23 - 31 mmol/L    Glucose 80 (L) 82 - 115 mg/dL    Blood Urea Nitrogen 29.5 (H) 8.4 - 25.7 mg/dL    Creatinine 6.64 (H) 0.72 - 1.25 mg/dL    BUN/Creatinine Ratio 4     Calcium 8.9 8.8 - 10.0 mg/dL    Anion Gap 18.0 mEq/L    eGFR 8 mL/min/1.73/m2   CBC with Differential    Collection Time: 02/04/25 10:31 AM   Result Value Ref Range    WBC 8.45 4.50 - 11.50 x10(3)/mcL    RBC 2.70 (L) 4.70 - 6.10 x10(6)/mcL    Hgb 8.6 (L) 14.0 - 18.0 g/dL    Hct 26.4 (L) 42.0 - 52.0 %    MCV 97.8 (H) 80.0 - 94.0 fL    MCH 31.9 (H) 27.0 - 31.0 pg    MCHC 32.6 (L) 33.0 - 36.0 g/dL    RDW 18.4 (H) 11.5 - 17.0 %    Platelet 124 (L) 130 - 400 x10(3)/mcL    MPV 10.8 (H) 7.4 - 10.4 fL    IPF 8.6 0.9 - 11.2 %    Neut % 79.5 %    Lymph % 11.7 %    Mono % 7.1 %    Eos % 0.8 %    Basophil % 0.4 %    Imm Grans % 0.5 %    Neut # 6.72 2.1 - 9.2 x10(3)/mcL    Lymph # 0.99 0.6 - 4.6 x10(3)/mcL    Mono # 0.60 0.1 - 1.3 x10(3)/mcL    Eos # 0.07 0 - 0.9 x10(3)/mcL    Baso # 0.03 <=0.2 x10(3)/mcL    Imm Gran # 0.04 0.00 - 0.04 x10(3)/mcL    NRBC% 0.0 %   ISTAT CHEM8    Collection Time: 02/04/25 12:02 PM   Result Value Ref Range    POC Glucose 86 70 - 110 mg/dL    POC BUN 27 6 - 30 mg/dL    POC Creatinine 6.8 (H) 0.5 - 1.4 mg/dL    POC Sodium 139 136 - 145 mmol/L    POC Potassium 4.2 3.5 - 5.1 mmol/L    POC Chloride 96 95 - 110 mmol/L    POC TCO2 (MEASURED) 30 (H) 23 - 29 mmol/L    POC Anion Gap 18 (H) 8 - 16 mmol/L    POC Ionized Calcium 1.16 1.06 - 1.42 mmol/L    POC Hematocrit 27 (L) 36 - 54 %PCV    POC HEMOGLOBIN 9 g/dL    Sample VENOUS              Assessment & Plan:    ESRD MWF  Acute on chronic anemia   Gastrointestinal hemorrhage    Recommendations:  No acute indication for any dialysis today  Hemodialysis tomorrow  Check labs tomorrow

## 2025-02-04 NOTE — PROGRESS NOTES
[facility]  Hospital Medicine Progress Note        Chief Complaint: Inpatient Follow-up     HPI:     79-year-old male with history of previous CVA, end-stage renal disease on hemodialysis, dementia, hyperlipidemia, hypertension, and Parkinson's.  Was admitted to the ER on 02/01/2025 complaint of abnormal labs with significant anemia.  Denies any melena or hematemesis.  In the emergency room he was found to have a hemoglobin of 8.7.  Was transfused secondary to a drop in hemoglobin to 7.4.  GI was consulted.  Tentatively planning on EGD on 02/03.  Nephrology consulted for management of his hemodialysis needs.  Vital signs remained stable.  He is admitted to the hospitalist group    EGD performed on 02/03 with some mild gastritis/erythema but no significant ulcerations noted.  GI recommending colonoscopy for further investigation to source of bleeding.     Interval Hx:  NPO this morning for colonoscopy.  Tolerated prep.  Scheduled for 11 this morning.  Blood pressure a little soft but asymptomatic.  Labs pending     Objective/physical exam:  General: In no acute distress, afebrile  Chest: Clear to auscultation bilaterally  Heart: RRR, +S1, S2, no appreciable murmur  Abdomen: Soft, nontender, BS +  MSK: Warm, no lower extremity edema, no clubbing or cyanosis  Neurologic: Alert and oriented x4, Cranial nerve II-XII intact, Strength 5/5 in all 4 extremities     VITAL SIGNS: 24 HRS MIN & MAX LAST   Temp  Min: 96.4 °F (35.8 °C)  Max: 98.5 °F (36.9 °C) 97.7 °F (36.5 °C)   BP  Min: 92/56  Max: 124/68 (!) 107/57   Pulse  Min: 65  Max: 78  75   Resp  Min: 6  Max: 20 20   SpO2  Min: 95 %  Max: 100 % 95 %       Recent Labs   Lab 02/01/25  0619 02/01/25  1540 02/02/25  0525 02/02/25  0720 02/03/25  0349   WBC 7.18  --  6.28  --  6.03   RBC 2.76*  --  2.31*  --  2.47*   HGB 8.7*   < > 7.4* 8.1* 7.7*   HCT 27.3*   < > 23.3* 25.5* 24.7*   MCV 98.9*  --  100.9*  --  100.0*   MCH 31.5*  --  32.0*  --   31.2*   MCHC 31.9*  --  31.8*  --  31.2*   RDW 18.1*  --  18.2*  --  17.9*     --  205  --  223   MPV 8.8  --  8.3  --  8.2    < > = values in this interval not displayed.       Recent Labs   Lab 02/01/25  0619 02/02/25  0525 02/03/25  0349    139 136   K 4.9 3.5 5.0   CL 97* 112* 101   CO2 28 20* 24   BUN 30.1* 30.9* 49.2*   CREATININE 6.38* 6.07* 9.95*   CALCIUM 9.5 7.1* 9.3   MG  --  2.40  --    ALBUMIN 2.9* 2.1* 2.7*   ALKPHOS 75 59 78   ALT 16 9 10   AST 18 8 9   BILITOT 0.4 0.3 0.4          Microbiology Results (last 7 days)       ** No results found for the last 168 hours. **             Radiology:  Cardiac catheterization  Procedure performed in the Invasive Lab    - See Procedure Log link below for nursing documentation    - See OpNote on Surgeries Tab for physician findings    - See Imaging Tab for radiologist dictation        Medications:  Scheduled Meds:   atorvastatin  40 mg Oral Nightly    carvediloL  3.125 mg Oral BID WM    cinacalcet  30 mg Oral Every other day    levETIRAcetam  500 mg Oral BID    memantine  10 mg Oral Daily    pantoprazole  40 mg Intravenous BID    sertraline  25 mg Oral Daily    tamsulosin  0.8 mg Oral QHS    trazodone  25 mg Oral QHS     Continuous Infusions:  PRN Meds:.  Current Facility-Administered Medications:     0.9%  NaCl infusion (for blood administration), , Intravenous, Q24H PRN    0.9%  NaCl infusion (for blood administration), , Intravenous, Q24H PRN    acetaminophen, 650 mg, Oral, Q8H PRN    melatonin, 6 mg, Oral, Nightly PRN    ondansetron, 4 mg, Intravenous, Q8H PRN    sodium chloride 0.9%, 10 mL, Intravenous, PRN    Nutrition:  Nutrition consulted. Most recent weight and BMI monitored-     Measurements:  Wt Readings from Last 1 Encounters:   02/01/25 79.4 kg (175 lb)   Body mass index is 21.87 kg/m².    Patient has been screened and assessed by RD.    Malnutrition Type:  Context:    Level:      Malnutrition Characteristic Summary:        Interventions/Recommendations (treatment strategy):           Assessment/Plan:   Suspected chronic GI bleed   Acute on chronic anemia, inflammatory   End-stage renal disease on hemodialysis   Chronic dementia/Parkinson's      Status post transfusion of 1 unit packed red blood cell on 02/02.  Follow up hemoglobin this morning.  Blood pressure is little soft.  If still below 8 will give another unit today.    EGD yesterday with some erythema/gastritis.  Continue with PPI.  Going for colonoscopy today.  Will follow up results and recommendations.  Nephrology following for HD management.  Dialyzed yesterday without issue.  Discharge planning pending GI recommendations post colonoscopy    Luis Craig MD   02/04/2025     All diagnosis and differential diagnosis have been reviewed; assessment and plan has been documented; I have personally reviewed the labs and test results that are presently available; I have reviewed the patients medication list; I have reviewed the consulting providers response and recommendations. I have reviewed or attempted to review medical records based upon their availability    All of the patient's questions have been  addressed and answered. Patient's is agreeable to the above stated plan. I will continue to monitor closely and make adjustments to medical management as needed.  _____________________________________________________________________

## 2025-02-04 NOTE — PLAN OF CARE
Problem: Adult Inpatient Plan of Care  Goal: Plan of Care Review  Outcome: Progressing  Goal: Patient-Specific Goal (Individualized)  Outcome: Progressing  Goal: Absence of Hospital-Acquired Illness or Injury  Outcome: Progressing  Goal: Optimal Comfort and Wellbeing  Outcome: Progressing  Goal: Readiness for Transition of Care  Outcome: Progressing     Problem: Skin Injury Risk Increased  Goal: Skin Health and Integrity  Outcome: Progressing     Problem: Fall Injury Risk  Goal: Absence of Fall and Fall-Related Injury  Outcome: Progressing     Problem: Pain Acute  Goal: Optimal Pain Control and Function  Outcome: Progressing     Problem: Gastrointestinal Bleeding  Goal: Optimal Coping with Acute Illness  Outcome: Progressing  Goal: Hemostasis  Outcome: Progressing     Problem: Hemodialysis  Goal: Safe, Effective Therapy Delivery  Outcome: Progressing  Goal: Effective Tissue Perfusion  Outcome: Progressing  Goal: Absence of Infection Signs and Symptoms  Outcome: Progressing

## 2025-02-04 NOTE — PROCEDURES
Angel Pablo Sr.   MRN: [id number]   ADMISSION DATE: 2025  : 1945  AGE: 79 y.o.    PROCEDURE:  Colonoscopy with snare polypectomy    DATE OF PROCEDURE:  2025     SURGEON: RADHA RUELAS    PREOPERATIVE DIAGNOSIS:  Anemia    POSTOPERATIVE DIAGNOSIS:  1. Scattered diverticula, ascending colon.  2. Colon polyps, 4-5 mm, around hepatic flexure.  Snare polypectomy was done.    3. Internal hemorrhoids, grade 2-3.  Otherwise normal exam.      HISTORY AND PHYSICAL:  As per preoperative note.      DESCRIPTION OF PROCEDURE:  Informed consent was obtained.  Patient was placed in left lateral position.  Sedation per anesthesia service.  Rectal exam was unremarkable.  Olympus video colonoscope was introduced into the rectum and was carefully advanced to cecum.  Quality of the preparation was fair.  Periodic irrigation was done throughout the procedure for better visualization.   Patient tolerated the procedure well without any complications.    FINDINGS:  There were scattered diverticula noted in the ascending colon.  Photodocumentation was obtained.  Two polyps ranging in size from 4-5 mm were noted around hepatic flexure.  Hot snare polypectomy was done with good hemostasis.  Terminal ileum was intubated during procedure and appeared to be unremarkable.  Remainder of the colon including cecum, ascending colon, transverse colon, descending colon, sigmoid colon and rectum appeared unremarkable except grade 2-3 internal hemorrhoids noted on withdrawal of the scope.  Estimated withdrawal time from cecum to rectum was more than 11 minutes    ESTIMATED BLOOD LOSS:  None.    COMPLICATIONS:  None    RECOMMENDATIONS:  1. Follow up biopsy results.    2. Repeat colonoscopy only if clinically indicated pending biopsy results.  3. Anemia could be related to underlying renal disease.  If patient has recurrent anemia not explained by underlying renal issues and other morbidities, video capsule endoscopy can be considered  later maybe as outpatient

## 2025-02-04 NOTE — ANESTHESIA PREPROCEDURE EVALUATION
02/04/2025  Angel Pablo Sr. is a 79 y.o., male , who presents for the following:    Procedure: COLON (Abdomen)   Anesthesia type: General/MAC   Diagnosis:      Anemia [D64.9]      ESRD (end stage renal disease) [N18.6]   Location: [facility] ENDO 03 / [facility] ENDOSCOPY   Surgeons: Singh Manning MD       EGD under TIVA (propofol), yesterday, [facility]    Hemodialysis (yesterday) :  Underwent half the normal run, K+ 5.6      Past Medical History:   Diagnosis Date    Benign prostatic hyperplasia with lower urinary tract symptoms     Carotid artery stenosis     Dementia     Dialysis patient     M/W/F    End stage renal disease     H/O prostate cancer     Hemiplegia and hemiparesis following cerebral infarction affecting left dominant side     Hyperlipidemia     Hypertension     Hypokalemia     Insomnia     Insomnia     Mechanical breakdown of surgically created arteriovenous fistula, subsequent encounter     Muscle wasting and atrophy, not elsewhere classified, multiple sites     Other lack of coordination     Pain in left knee     Paralytic gait     Presbyopia     Presence of intraocular lens     Repeated falls     Seizures     Stroke          Pre-op Assessment    I have reviewed the Patient Summary Reports.     I have reviewed the Nursing Notes. I have reviewed the NPO Status.   I have reviewed the Medications.     Review of Systems  Anesthesia Hx:  No problems with previous Anesthesia             Denies Family Hx of Anesthesia complications.    Denies Personal Hx of Anesthesia complications.                    Social:  Non-Smoker       Cardiovascular:     Hypertension                                          Renal/:  Chronic Renal Disease, ESRD, Dialysis   Last dialysis, yesterday             Neurological:   CVA    Seizures    Left hemiplegia and hemiparesis                            Psych:     Dementia                 Physical Exam  General: Alert and Oriented    Airway:  Mallampati: II   Mouth Opening: Normal  TM Distance: Normal  Tongue: Normal  Neck ROM: Normal ROM    Dental:  Intact  Upper edentulous  Chest/Lungs:  Normal Respiratory Rate    Heart:  Rate: Normal  Rhythm: Regular Rhythm        Anesthesia Plan  Type of Anesthesia, risks & benefits discussed:    Anesthesia Type: Gen Natural Airway  Intra-op Monitoring Plan: Standard ASA Monitors  Post Op Pain Control Plan: IV/PO Opioids PRN  Induction:  IV  Airway Plan: Direct  Informed Consent: Informed consent signed with the Patient and all parties understand the risks and agree with anesthesia plan.  All questions answered. Patient consented to blood products? No  ASA Score: 4  Day of Surgery Review of History & Physical: H&P Update referred to the surgeon/provider.  Anesthesia Plan Notes: Nasal cannula vs facemask supplemental oxygenation   For patients with VALE/obesity, may consider SuperNoval Nasal CPAP      Ready For Surgery From Anesthesia Perspective.     .

## 2025-02-04 NOTE — TRANSFER OF CARE
"Anesthesia Transfer of Care Note    Patient: Angel Pablo Sr.    Procedure(s) Performed: Procedure(s) (LRB):  COLONOSCOPY, WITH POLYPECTOMY USING HOT BIOPSY FORCEPS (N/A)    Patient location: GI    Anesthesia Type: general    Transport from OR: Transported from OR on room air with adequate spontaneous ventilation    Post pain: adequate analgesia    Post assessment: no apparent anesthetic complications and tolerated procedure well    Post vital signs: stable    Level of consciousness: awake, alert and oriented    Nausea/Vomiting: no nausea/vomiting    Complications: none    Transfer of care protocol was followed      Last vitals: Visit Vitals  BP (!) 98/49   Pulse 75   Temp 35.9 °C (96.6 °F)   Resp 20   Ht 6' 3" (1.905 m)   Wt 79.4 kg (175 lb)   SpO2 96%   BMI 21.87 kg/m²     "

## 2025-02-05 VITALS
HEIGHT: 75 IN | HEART RATE: 74 BPM | SYSTOLIC BLOOD PRESSURE: 102 MMHG | OXYGEN SATURATION: 96 % | RESPIRATION RATE: 18 BRPM | DIASTOLIC BLOOD PRESSURE: 58 MMHG | WEIGHT: 175 LBS | BODY MASS INDEX: 21.76 KG/M2 | TEMPERATURE: 99 F

## 2025-02-05 LAB
ABO + RH BLD: NORMAL
ABO + RH BLD: NORMAL
ANION GAP SERPL CALC-SCNC: 15 MEQ/L
BASOPHILS # BLD AUTO: 0.02 X10(3)/MCL
BASOPHILS NFR BLD AUTO: 0.4 %
BLD PROD TYP BPU: NORMAL
BLD PROD TYP BPU: NORMAL
BLOOD UNIT EXPIRATION DATE: NORMAL
BLOOD UNIT EXPIRATION DATE: NORMAL
BLOOD UNIT TYPE CODE: 5100
BLOOD UNIT TYPE CODE: 5100
BUN SERPL-MCNC: 37.9 MG/DL (ref 8.4–25.7)
CALCIUM SERPL-MCNC: 9.1 MG/DL (ref 8.8–10)
CHLORIDE SERPL-SCNC: 99 MMOL/L (ref 98–107)
CO2 SERPL-SCNC: 27 MMOL/L (ref 23–31)
CREAT SERPL-MCNC: 8.37 MG/DL (ref 0.72–1.25)
CREAT/UREA NIT SERPL: 5
CROSSMATCH INTERPRETATION: NORMAL
CROSSMATCH INTERPRETATION: NORMAL
DISPENSE STATUS: NORMAL
DISPENSE STATUS: NORMAL
EOSINOPHIL # BLD AUTO: 0.07 X10(3)/MCL (ref 0–0.9)
EOSINOPHIL NFR BLD AUTO: 1.3 %
ERYTHROCYTE [DISTWIDTH] IN BLOOD BY AUTOMATED COUNT: 18.6 % (ref 11.5–17)
GFR SERPLBLD CREATININE-BSD FMLA CKD-EPI: 6 ML/MIN/1.73/M2
GLUCOSE SERPL-MCNC: 91 MG/DL (ref 82–115)
HCT VFR BLD AUTO: 24.8 % (ref 42–52)
HGB BLD-MCNC: 8 G/DL (ref 14–18)
IMM GRANULOCYTES # BLD AUTO: 0.03 X10(3)/MCL (ref 0–0.04)
IMM GRANULOCYTES NFR BLD AUTO: 0.5 %
LYMPHOCYTES # BLD AUTO: 1.17 X10(3)/MCL (ref 0.6–4.6)
LYMPHOCYTES NFR BLD AUTO: 21 %
MCH RBC QN AUTO: 32 PG (ref 27–31)
MCHC RBC AUTO-ENTMCNC: 32.3 G/DL (ref 33–36)
MCV RBC AUTO: 99.2 FL (ref 80–94)
MONOCYTES # BLD AUTO: 0.59 X10(3)/MCL (ref 0.1–1.3)
MONOCYTES NFR BLD AUTO: 10.6 %
NEUTROPHILS # BLD AUTO: 3.69 X10(3)/MCL (ref 2.1–9.2)
NEUTROPHILS NFR BLD AUTO: 66.2 %
NRBC BLD AUTO-RTO: 0 %
PLATELET # BLD AUTO: 195 X10(3)/MCL (ref 130–400)
PMV BLD AUTO: 8.3 FL (ref 7.4–10.4)
POTASSIUM SERPL-SCNC: 4.1 MMOL/L (ref 3.5–5.1)
RBC # BLD AUTO: 2.5 X10(6)/MCL (ref 4.7–6.1)
SODIUM SERPL-SCNC: 141 MMOL/L (ref 136–145)
UNIT NUMBER: NORMAL
UNIT NUMBER: NORMAL
WBC # BLD AUTO: 5.57 X10(3)/MCL (ref 4.5–11.5)

## 2025-02-05 PROCEDURE — 80048 BASIC METABOLIC PNL TOTAL CA: CPT | Performed by: HOSPITALIST

## 2025-02-05 PROCEDURE — 80100016 HC MAINTENANCE HEMODIALYSIS

## 2025-02-05 PROCEDURE — 25000003 PHARM REV CODE 250: Performed by: INTERNAL MEDICINE

## 2025-02-05 PROCEDURE — 36415 COLL VENOUS BLD VENIPUNCTURE: CPT | Performed by: HOSPITALIST

## 2025-02-05 PROCEDURE — 92610 EVALUATE SWALLOWING FUNCTION: CPT

## 2025-02-05 PROCEDURE — 85025 COMPLETE CBC W/AUTO DIFF WBC: CPT | Performed by: HOSPITALIST

## 2025-02-05 PROCEDURE — 25000003 PHARM REV CODE 250: Performed by: HOSPITALIST

## 2025-02-05 RX ORDER — MUPIROCIN 20 MG/G
OINTMENT TOPICAL 2 TIMES DAILY
Qty: 1 EACH | Refills: 0 | Status: SHIPPED | OUTPATIENT
Start: 2025-02-06 | End: 2025-02-11

## 2025-02-05 RX ORDER — PANTOPRAZOLE SODIUM 40 MG/1
40 TABLET, DELAYED RELEASE ORAL
Qty: 180 TABLET | Refills: 3 | Status: SHIPPED | OUTPATIENT
Start: 2025-02-05 | End: 2026-02-05

## 2025-02-05 RX ORDER — PANTOPRAZOLE SODIUM 40 MG/1
40 TABLET, DELAYED RELEASE ORAL
Status: DISCONTINUED | OUTPATIENT
Start: 2025-02-05 | End: 2025-02-05 | Stop reason: HOSPADM

## 2025-02-05 RX ORDER — MUPIROCIN 20 MG/G
OINTMENT TOPICAL 2 TIMES DAILY
Status: DISCONTINUED | OUTPATIENT
Start: 2025-02-06 | End: 2025-02-05 | Stop reason: HOSPADM

## 2025-02-05 RX ADMIN — CARVEDILOL 3.12 MG: 3.12 TABLET, FILM COATED ORAL at 12:02

## 2025-02-05 RX ADMIN — LEVETIRACETAM 500 MG: 500 TABLET, FILM COATED ORAL at 12:02

## 2025-02-05 RX ADMIN — SERTRALINE HYDROCHLORIDE 25 MG: 25 TABLET ORAL at 12:02

## 2025-02-05 RX ADMIN — MEMANTINE 10 MG: 5 TABLET ORAL at 12:02

## 2025-02-05 RX ADMIN — PANTOPRAZOLE SODIUM 40 MG: 40 TABLET, DELAYED RELEASE ORAL at 07:02

## 2025-02-05 NOTE — PROGRESS NOTES
Angel Pablo Sr.   MRN: [id number]   ADMISSION DATE: 2025  : 1945  AGE: 79 y.o.    DATE :  2025       PROVIDER: RADHA RUELAS      SUBJECTIVE:  No sig. GI issues reported.     Review of Systems   No sig. GI issues reported. No fever or chills. No abd. pain. No cardiopulmonary symptoms.    Review of patient's allergies indicates:  No Known Allergies      atorvastatin  40 mg Oral Nightly    carvediloL  3.125 mg Oral BID WM    cinacalcet  30 mg Oral Every other day    levETIRAcetam  500 mg Oral BID    memantine  10 mg Oral Daily    [START ON 2025] mupirocin   Nasal BID    pantoprazole  40 mg Oral BID AC    sertraline  25 mg Oral Daily    tamsulosin  0.8 mg Oral QHS    trazodone  25 mg Oral QHS       Medications Discontinued During This Encounter   Medication Reason    aspirin (ECOTRIN) 81 MG EC tablet Error    potassium chloride (KLOR-CON) 10 MEQ TbSR Error    zinc gluconate 50 mg tablet Error    0.9% NaCl infusion     pantoprazole (PROTONIX) 40 mg in 0.9% NaCl 100 mL IVPB (MB+)     pantoprazole injection 40 mg              Past Medical History:   Diagnosis Date    Benign prostatic hyperplasia with lower urinary tract symptoms     Carotid artery stenosis     Dementia     Dialysis patient     M/W/F    End stage renal disease     H/O prostate cancer     Hemiplegia and hemiparesis following cerebral infarction affecting left dominant side     Hyperlipidemia     Hypertension     Hypokalemia     Insomnia     Insomnia     Mechanical breakdown of surgically created arteriovenous fistula, subsequent encounter     Muscle wasting and atrophy, not elsewhere classified, multiple sites     Other lack of coordination     Pain in left knee     Paralytic gait     Presbyopia     Presence of intraocular lens     Repeated falls     Seizures     Stroke       Social History     Socioeconomic History    Marital status: Single   Tobacco Use    Smoking status: Never    Smokeless tobacco: Never   Substance and Sexual  Activity    Alcohol use: Not Currently    Drug use: Never    Sexual activity: Not Currently   Social History Narrative    ** Merged History Encounter **          Social Drivers of Health     Financial Resource Strain: Patient Declined (2/1/2025)    Overall Financial Resource Strain (CARDIA)     Difficulty of Paying Living Expenses: Patient declined   Food Insecurity: Patient Declined (2/1/2025)    Hunger Vital Sign     Worried About Running Out of Food in the Last Year: Patient declined     Ran Out of Food in the Last Year: Patient declined   Transportation Needs: Patient Declined (2/1/2025)    TRANSPORTATION NEEDS     Transportation : Patient declined   Stress: Patient Declined (2/1/2025)    [employer] - Occupational Stress Questionnaire     Feeling of Stress : Patient declined   Housing Stability: Patient Declined (2/1/2025)    Housing Stability Vital Sign     Unable to Pay for Housing in the Last Year: Patient declined     Homeless in the Last Year: Patient declined      Past Surgical History:   Procedure Laterality Date    COLONOSCOPY      COLONOSCOPY, WITH POLYPECTOMY USING HOT BIOPSY FORCEPS N/A 2/4/2025    Procedure: COLONOSCOPY, WITH POLYPECTOMY USING HOT BIOPSY FORCEPS;  Surgeon: Singh Manning MD;  Location: [facility] ENDOSCOPY;  Service: Gastroenterology;  Laterality: N/A;    EGD, WITH CLOSED BIOPSY N/A 2/3/2025    Procedure: EGD;  Surgeon: Singh Manning MD;  Location: [facility] ENDOSCOPY;  Service: Gastroenterology;  Laterality: N/A;    EXCISION OF ARTERIOVENOUS FISTULA Left 05/03/2023    Procedure: EXCISION, AV FISTULA;  Surgeon: Natalie Delatorre MD;  Location: [facility] OR;  Service: Peripheral Vascular;  Laterality: Left;  LEFT UPPER ARM FISTULA REVISION WITH TUNNELED CATHETER INSERTION // VASCULAR // SUPINE // SUPRACLAVICULAR BLOCK    FISTULOGRAM N/A 04/20/2023    Procedure: Fistulogram;  Surgeon: Akil Escobar DO;  Location: [facility] CATH LAB;  Service: Nephrology;   Laterality: N/A;  SANCHEZ FISTULOGRAM    FISTULOGRAM Left 08/22/2023    Dr. Escobar    FISTULOGRAM Left 8/22/2023    Procedure: Fistulogram;  Surgeon: Akil Escobar DO;  Location: [facility] CATH LAB;  Service: Nephrology;  Laterality: Left;    FISTULOGRAM Left 9/28/2023    Procedure: Fistulogram;  Surgeon: Akil Escobar DO;  Location: [facility] CATH LAB;  Service: Nephrology;  Laterality: Left;    INSERTION, VASCULAR ACCESS CATHETER N/A 05/03/2023    Procedure: INSERTION, VASCULAR ACCESS CATHETER;  Surgeon: Natalie Delatorre MD;  Location: [facility] OR;  Service: Peripheral Vascular;  Laterality: N/A;    KNEE SURGERY Left     REMOVAL OF TUNNELED CENTRAL VENOUS CATHETER (CVC) N/A 9/28/2023    Procedure: REMOVAL, CATHETER, CENTRAL VENOUS, TUNNELED;  Surgeon: Akil Escobar DO;  Location: [facility] CATH LAB;  Service: Nephrology;  Laterality: N/A;    REMOVAL OF TUNNELED CENTRAL VENOUS CATHETER (CVC) N/A 10/31/2023    Procedure: REMOVAL, CATHETER, CENTRAL VENOUS, TUNNELED;  Surgeon: Akil Escobar DO;  Location: [facility] CATH LAB;  Service: Nephrology;  Laterality: N/A;    THROMBECTOMY Left 12/27/2024    Procedure: THROMBECTOMY;  Surgeon: Akil Escobar DO;  Location: [facility] CATH LAB;  Service: Nephrology;  Laterality: Left;    TRANSPOSITION OF BASILIC VEIN Left 7/3/2023    Procedure: TRANSPOSITION, VEIN, BASILIC;  Surgeon: Natalie Delatorre MD;  Location: [facility] OR;  Service: Peripheral Vascular;  Laterality: Left;  left radiocephalic vs basilic fistula creation, supraclavicular block // XX        OBJECTIVE:     Vitals:    02/05/25 0324 02/05/25 0726 02/05/25 1201 02/05/25 1510   BP: 114/71 114/64 120/64 (!) 102/58   Pulse: 74 70 73 74   Resp: 20 18  18   Temp: 98.2 °F (36.8 °C) 97.8 °F (36.6 °C) 97.6 °F (36.4 °C) 98.6 °F (37 °C)   TempSrc: Oral Oral Oral Oral   SpO2: 96% 97% 97% 96%   Weight:       Height:           Physical Exam   GENERAL: In no acute distress, afebrile  HEENT:  CHEST: Clear to auscultation bilaterally  HEART: S1, S2, no  "appreciable murmur  ABDOMEN: Soft, nontender, BS +  MSK: Warm, no lower extremity edema, no clubbing or cyanosis  NEUROLOGIC: Alert and oriented x4, moving all extremities with good strength        LABS    Recent Labs   Lab 02/03/25  0349 02/04/25  1031 02/04/25  1202 02/05/25  0635   WBC 6.03 8.45  --  5.57   HGB 7.7* 8.6*  --  8.0*   HCT 24.7* 26.4* 27* 24.8*    124*  --  195      Recent Labs   Lab 02/01/25  0619 02/02/25  0525 02/03/25  0349 02/04/25  1031 02/05/25  0635    139 136 140 141   K 4.9 3.5 5.0 5.6* 4.1   CL 97* 112* 101 96* 99   CO2 28 20* 24 26 27   BUN 30.1* 30.9* 49.2* 29.5* 37.9*   CREATININE 6.38* 6.07* 9.95* 6.64* 8.37*   CALCIUM 9.5 7.1* 9.3 8.9 9.1   BILITOT 0.4 0.3 0.4  --   --    ALKPHOS 75 59 78  --   --    ALT 16 9 10  --   --    AST 18 8 9  --   --    GLUCOSE 89 68* 77* 80* 91      Recent Labs   Lab 02/01/25  0104   INR 1.1    No results for input(s): "AMYLASE" in the last 168 hours.   Recent Labs   Lab 02/01/25  0104   APTT 30.7   INR 1.1           RESULTS: No results found.           ICD-10-CM ICD-9-CM   1. ESRD (end stage renal disease)  N18.6 585.6   2. Anemia  D64.9 285.9   3. Gastrointestinal hemorrhage, unspecified gastrointestinal hemorrhage type  K92.2 578.9   4. Acute on chronic anemia  D64.9 285.9          ASSESSMENT & PLAN:      79-year-old male with a history of ESRD on dialysis Monday Wednesday Friday presents ED for evaluation of abnormal labs. He had labs performed yesterday with a low H&H. He denies any complaints at this time. He states he dialyze yesterday.      Recommendations:   1. Follow up hemoglobin/hematocrit.  No evidence of overt GI blood loss.  EGD can be done possibly on Monday if continues to remain stable.  It can be done earlier if necessary pending course in hospital.   NPO after midnight on Sunday  2. Need to clarify in regards to his last colonoscopy.  He is reported to have a colonoscopy in 2023.   3. Patient can be on clear liquid diet.  " Follow up hemoglobin/hematocrit.  If hemoglobin below 7, transfuse additional packed RBC.    4. Patient is being followed up by Nephrology service.       2/2/25  Patient seems to be doing well from GI standpoint.  No overt GI bleed.  For possible dialysis tomorrow since he was not dialyzed over the weekend.  NPO after midnight for possible EGD tomorrow.  Need to clarify regarding his primary gastroenterologist.    2/5/25  Patient was seen earlier today. No sig. GI issues noted. No abd. pain, n/v or any overt GI bleed. EGD/Colonoscopy with no acute abnormality noted. F/U with PCP/Nephrology on discharge. If anemia gets worse, VCE (video capsule endoscopy) can be done as outpatient).

## 2025-02-05 NOTE — PROGRESS NOTES
Nephrology progress note  [facility]      Patient Name: Angel Pablo Sr.  Age: 79 y.o.  : 1945  MRN: [id number]  Admission Date: 2025        Angel Pablo Sr. is a 79 y.o. male with history of ESRD who dialyzes on MWF schedule at [facility] in [place] under the care of Dr. Agosto via left upper arm fistula.  He was sent to the ED for evaluation after being found to have low hemoglobin on outpatient labs.  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  GI was consulted for further eval of GI bleed.  He was transfused 1 unit PRBCs.  Awaiting GI consult.  We are consulted for management of ESRD.    2025   Came back from GI procedure.  No complaints.  Ate liquid diet for lunch.  No chest pain no abdominal pain no shortness of breath.  No fever or chills.    2025   Currently tolerating dialysis.  No new complaints.  No chest pain no abdominal pain no shortness of breath no weakness no dizziness.    PCP:  Prieto Vale MD    Review of patient's allergies indicates:  No Known Allergies    Current Facility-Administered Medications   Medication Dose Route Frequency Provider Last Rate Last Admin    0.9%  NaCl infusion (for blood administration)   Intravenous Q24H PRN Marilee Calvin MD        0.9%  NaCl infusion (for blood administration)   Intravenous Q24H PRN Bob Prater MD        acetaminophen tablet 650 mg  650 mg Oral Q8H PRN Fareed Beasley MD        atorvastatin tablet 40 mg  40 mg Oral Nightly Bob Prater MD   40 mg at 25    carvediloL tablet 3.125 mg  3.125 mg Oral BID WM Bob Prater MD   3.125 mg at 25 1733    cinacalcet tablet 30 mg  30 mg Oral Every other day Bob Prater MD   30 mg at 25 0900    levETIRAcetam tablet 500 mg  500 mg Oral BID  Bob Parter MD   500 mg at 02/04/25 2030    melatonin tablet 6 mg  6 mg Oral Nightly PRN Fareed Beasley MD        memantine tablet 10 mg  10 mg Oral Daily Bob Prater MD   10 mg at 02/04/25 1445    [START ON 2/6/2025] mupirocin 2 % ointment   Nasal BID Luis Craig MD        ondansetron injection 4 mg  4 mg Intravenous Q8H PRN Fareed Beasley MD   4 mg at 02/03/25 1141    pantoprazole EC tablet 40 mg  40 mg Oral BID AC Luis Craig MD   40 mg at 02/05/25 0720    sertraline tablet 25 mg  25 mg Oral Daily Bob Prater MD   25 mg at 02/04/25 1445    sodium chloride 0.9% flush 10 mL  10 mL Intravenous PRN Fareed Beasley MD        tamsulosin 24 hr capsule 0.8 mg  0.8 mg Oral QHS Bob Prater MD   0.8 mg at 02/04/25 2004    trazodone split tablet 25 mg  25 mg Oral QHS Bob Prater MD   25 mg at 02/03/25 2119     Facility-Administered Medications Ordered in Other Encounters   Medication Dose Route Frequency Provider Last Rate Last Admin    sodium chloride 0.9% flush 10 mL  10 mL Intravenous PRN Sanjay Escobarir, DO        sodium chloride 0.9% flush 10 mL  10 mL Intravenous PRN Akil Escobar, DO         Medications Prior to Admission   Medication Sig Dispense Refill Last Dose/Taking    ascorbic acid, vitamin C, (VITAMIN C) 1000 MG tablet Take 1,000 mg by mouth once daily.   1/31/2025    atorvastatin (LIPITOR) 40 MG tablet Take 40 mg by mouth nightly.   1/31/2025    B complex-vitamin C-folic acid (MARIE-JAJA/NEPHRO-JAJA) 0.8 mg Tab Take 1 tablet by mouth once daily.   1/31/2025    carvediloL (COREG) 3.125 MG tablet Take 3.125 mg by mouth 2 (two) times daily with meals.   1/31/2025    folic acid (FOLVITE) 800 MCG Tab Take 800 mcg by mouth once daily.   1/31/2025    levETIRAcetam (KEPPRA) 500 MG Tab Take 500 mg by mouth 2 (two) times daily.   1/31/2025    magnesium hydroxide 400 mg/5 ml (MILK OF MAGNESIA) 400 mg/5 mL Susp Take 30 mLs by mouth daily as needed.   1/28/2025    melatonin (MELATIN) 5  mg Take 10 mg by mouth nightly as needed for Insomnia.   1/31/2025    memantine (NAMENDA) 10 MG Tab Take 10 mg by mouth 2 (two) times daily.   1/31/2025    mirabegron (MYRBETRIQ) 50 mg Tb24 Take 1 tablet by mouth once daily.   1/31/2025    polyethylene glycol (GLYCOLAX) 17 gram PwPk Take 17 g by mouth daily as needed for Constipation.   1/31/2025    sertraline (ZOLOFT) 25 MG tablet Take 25 mg by mouth once daily.   1/31/2025    tamsulosin (FLOMAX) 0.4 mg Cap Take 0.8 mg by mouth every evening.   1/31/2025    acetaminophen (TYLENOL) 650 MG TbSR Take 650 mg by mouth every 4 (four) hours as needed.       cinacalcet (SENSIPAR) 30 MG Tab Take 30 mg by mouth every other day.   1/30/2025    ondansetron (ZOFRAN-ODT) 4 MG TbDL Take 4 mg by mouth every 6 (six) hours as needed.   1/20/2025    trazodone (DESYREL) 25 MG tablet Take 25 mg by mouth every evening.          Past Medical History:  Past Medical History:   Diagnosis Date    Benign prostatic hyperplasia with lower urinary tract symptoms     Carotid artery stenosis     Dementia     Dialysis patient     M/W/F    End stage renal disease     H/O prostate cancer     Hemiplegia and hemiparesis following cerebral infarction affecting left dominant side     Hyperlipidemia     Hypertension     Hypokalemia     Insomnia     Insomnia     Mechanical breakdown of surgically created arteriovenous fistula, subsequent encounter     Muscle wasting and atrophy, not elsewhere classified, multiple sites     Other lack of coordination     Pain in left knee     Paralytic gait     Presbyopia     Presence of intraocular lens     Repeated falls     Seizures     Stroke        Past Surgical History:  Past Surgical History:   Procedure Laterality Date    COLONOSCOPY      COLONOSCOPY, WITH POLYPECTOMY USING HOT BIOPSY FORCEPS N/A 2/4/2025    Procedure: COLONOSCOPY, WITH POLYPECTOMY USING HOT BIOPSY FORCEPS;  Surgeon: Singh Manning MD;  Location: [facility] ENDOSCOPY;  Service: Gastroenterology;   Laterality: N/A;    EGD, WITH CLOSED BIOPSY N/A 2/3/2025    Procedure: EGD;  Surgeon: Singh Manning MD;  Location: [facility] ENDOSCOPY;  Service: Gastroenterology;  Laterality: N/A;    EXCISION OF ARTERIOVENOUS FISTULA Left 05/03/2023    Procedure: EXCISION, AV FISTULA;  Surgeon: Natalie Delatorre MD;  Location: [facility] OR;  Service: Peripheral Vascular;  Laterality: Left;  LEFT UPPER ARM FISTULA REVISION WITH TUNNELED CATHETER INSERTION // VASCULAR // SUPINE // SUPRACLAVICULAR BLOCK    FISTULOGRAM N/A 04/20/2023    Procedure: Fistulogram;  Surgeon: Akil Escobar DO;  Location: [facility] CATH LAB;  Service: Nephrology;  Laterality: N/A;  SANCHEZ FISTULOGRAM    FISTULOGRAM Left 08/22/2023    Dr. Escobar    FISTULOGRAM Left 8/22/2023    Procedure: Fistulogram;  Surgeon: Akil Escobar DO;  Location: [facility] CATH LAB;  Service: Nephrology;  Laterality: Left;    FISTULOGRAM Left 9/28/2023    Procedure: Fistulogram;  Surgeon: Akil Escobar DO;  Location: [facility] CATH LAB;  Service: Nephrology;  Laterality: Left;    INSERTION, VASCULAR ACCESS CATHETER N/A 05/03/2023    Procedure: INSERTION, VASCULAR ACCESS CATHETER;  Surgeon: Natalie Delatorre MD;  Location: [facility];  Service: Peripheral Vascular;  Laterality: N/A;    KNEE SURGERY Left     REMOVAL OF TUNNELED CENTRAL VENOUS CATHETER (CVC) N/A 9/28/2023    Procedure: REMOVAL, CATHETER, CENTRAL VENOUS, TUNNELED;  Surgeon: Akil Escobar DO;  Location: [facility] CATH LAB;  Service: Nephrology;  Laterality: N/A;    REMOVAL OF TUNNELED CENTRAL VENOUS CATHETER (CVC) N/A 10/31/2023    Procedure: REMOVAL, CATHETER, CENTRAL VENOUS, TUNNELED;  Surgeon: Akil Escobar DO;  Location: [facility] CATH LAB;  Service: Nephrology;  Laterality: N/A;    THROMBECTOMY Left 12/27/2024    Procedure: THROMBECTOMY;  Surgeon: Akil Escobar DO;  Location: [facility] CATH LAB;  Service: Nephrology;  Laterality: Left;    TRANSPOSITION OF BASILIC VEIN Left 7/3/2023    Procedure: TRANSPOSITION, VEIN, BASILIC;  Surgeon: Nandini  Natalei JONES MD;  Location: [facility];  Service: Peripheral Vascular;  Laterality: Left;  left radiocephalic vs basilic fistula creation, supraclavicular block // XX       Family History:  Family History   Family history unknown: Yes       Social History:  Social History     Tobacco Use    Smoking status: Never    Smokeless tobacco: Never   Substance Use Topics    Alcohol use: Not Currently          Review of Systems:    Review of Systems   Eyes:  Negative for pain and discharge.   Respiratory: Negative.     Cardiovascular: Negative.  Negative for chest pain and leg swelling.   Gastrointestinal:  Negative for nausea and vomiting.   Hematological:  Negative for adenopathy. Does not bruise/bleed easily.   Psychiatric/Behavioral: Negative.         Objective:    VITAL SIGNS: 24 HR MIN & MAX LAST  Temp  Min: 96.6 °F (35.9 °C)  Max: 98.3 °F (36.8 °C) 97.8 °F (36.6 °C)  BP  Min: 98/49  Max: 128/65 114/64  Pulse  Min: 66  Max: 81 70  Resp  Min: 16  Max: 22 18  SpO2  Min: 93 %  Max: 100 % 97 %      Intake/Output Summary (Last 24 hours) at 2/5/2025 0904  Last data filed at 2/4/2025 1314  Gross per 24 hour   Intake 50 ml   Output --   Net 50 ml       Physical Exam  Constitutional:       Comments: Extremely thin   HENT:      Head: Normocephalic.      Right Ear: External ear normal.      Left Ear: External ear normal.      Nose: Nose normal.      Mouth/Throat:      Mouth: Mucous membranes are moist.   Eyes:      Extraocular Movements: Extraocular movements intact.      Pupils: Pupils are equal, round, and reactive to light.   Cardiovascular:      Rate and Rhythm: Normal rate and regular rhythm.   Pulmonary:      Effort: Pulmonary effort is normal.   Abdominal:      General: Abdomen is flat. Bowel sounds are normal.      Palpations: Abdomen is soft.   Musculoskeletal:      Cervical back: Normal range of motion.      Right lower leg: No edema.      Left lower leg: No edema.   Skin:     General: Skin is warm and dry.   Neurological:       Mental Status: He is alert and oriented to person, place, and time.      Comments: Left hemiparesis   Psychiatric:         Mood and Affect: Mood normal.         Behavior: Behavior normal.       Dialysis Access: left upper arm AVF    Recent Results (from the past 24 hours)   Basic Metabolic Panel    Collection Time: 02/04/25 10:31 AM   Result Value Ref Range    Sodium 140 136 - 145 mmol/L    Potassium 5.6 (H) 3.5 - 5.1 mmol/L    Chloride 96 (L) 98 - 107 mmol/L    CO2 26 23 - 31 mmol/L    Glucose 80 (L) 82 - 115 mg/dL    Blood Urea Nitrogen 29.5 (H) 8.4 - 25.7 mg/dL    Creatinine 6.64 (H) 0.72 - 1.25 mg/dL    BUN/Creatinine Ratio 4     Calcium 8.9 8.8 - 10.0 mg/dL    Anion Gap 18.0 mEq/L    eGFR 8 mL/min/1.73/m2   CBC with Differential    Collection Time: 02/04/25 10:31 AM   Result Value Ref Range    WBC 8.45 4.50 - 11.50 x10(3)/mcL    RBC 2.70 (L) 4.70 - 6.10 x10(6)/mcL    Hgb 8.6 (L) 14.0 - 18.0 g/dL    Hct 26.4 (L) 42.0 - 52.0 %    MCV 97.8 (H) 80.0 - 94.0 fL    MCH 31.9 (H) 27.0 - 31.0 pg    MCHC 32.6 (L) 33.0 - 36.0 g/dL    RDW 18.4 (H) 11.5 - 17.0 %    Platelet 124 (L) 130 - 400 x10(3)/mcL    MPV 10.8 (H) 7.4 - 10.4 fL    IPF 8.6 0.9 - 11.2 %    Neut % 79.5 %    Lymph % 11.7 %    Mono % 7.1 %    Eos % 0.8 %    Basophil % 0.4 %    Imm Grans % 0.5 %    Neut # 6.72 2.1 - 9.2 x10(3)/mcL    Lymph # 0.99 0.6 - 4.6 x10(3)/mcL    Mono # 0.60 0.1 - 1.3 x10(3)/mcL    Eos # 0.07 0 - 0.9 x10(3)/mcL    Baso # 0.03 <=0.2 x10(3)/mcL    Imm Gran # 0.04 0.00 - 0.04 x10(3)/mcL    NRBC% 0.0 %   ISTAT CHEM8    Collection Time: 02/04/25 12:02 PM   Result Value Ref Range    POC Glucose 86 70 - 110 mg/dL    POC BUN 27 6 - 30 mg/dL    POC Creatinine 6.8 (H) 0.5 - 1.4 mg/dL    POC Sodium 139 136 - 145 mmol/L    POC Potassium 4.2 3.5 - 5.1 mmol/L    POC Chloride 96 95 - 110 mmol/L    POC TCO2 (MEASURED) 30 (H) 23 - 29 mmol/L    POC Anion Gap 18 (H) 8 - 16 mmol/L    POC Ionized Calcium 1.16 1.06 - 1.42 mmol/L    POC Hematocrit 27 (L)  36 - 54 %PCV    POC HEMOGLOBIN 9 g/dL    Sample VENOUS    Basic Metabolic Panel    Collection Time: 02/05/25  6:35 AM   Result Value Ref Range    Sodium 141 136 - 145 mmol/L    Potassium 4.1 3.5 - 5.1 mmol/L    Chloride 99 98 - 107 mmol/L    CO2 27 23 - 31 mmol/L    Glucose 91 82 - 115 mg/dL    Blood Urea Nitrogen 37.9 (H) 8.4 - 25.7 mg/dL    Creatinine 8.37 (H) 0.72 - 1.25 mg/dL    BUN/Creatinine Ratio 5     Calcium 9.1 8.8 - 10.0 mg/dL    Anion Gap 15.0 mEq/L    eGFR 6 mL/min/1.73/m2   CBC with Differential    Collection Time: 02/05/25  6:35 AM   Result Value Ref Range    WBC 5.57 4.50 - 11.50 x10(3)/mcL    RBC 2.50 (L) 4.70 - 6.10 x10(6)/mcL    Hgb 8.0 (L) 14.0 - 18.0 g/dL    Hct 24.8 (L) 42.0 - 52.0 %    MCV 99.2 (H) 80.0 - 94.0 fL    MCH 32.0 (H) 27.0 - 31.0 pg    MCHC 32.3 (L) 33.0 - 36.0 g/dL    RDW 18.6 (H) 11.5 - 17.0 %    Platelet 195 130 - 400 x10(3)/mcL    MPV 8.3 7.4 - 10.4 fL    Neut % 66.2 %    Lymph % 21.0 %    Mono % 10.6 %    Eos % 1.3 %    Basophil % 0.4 %    Imm Grans % 0.5 %    Neut # 3.69 2.1 - 9.2 x10(3)/mcL    Lymph # 1.17 0.6 - 4.6 x10(3)/mcL    Mono # 0.59 0.1 - 1.3 x10(3)/mcL    Eos # 0.07 0 - 0.9 x10(3)/mcL    Baso # 0.02 <=0.2 x10(3)/mcL    Imm Gran # 0.03 0.00 - 0.04 x10(3)/mcL    NRBC% 0.0 %             Assessment & Plan:    ESRD MWF  Acute on chronic anemia   Gastrointestinal hemorrhage    Recommendations:  Patient advised to continue outpatient dialysis on Monday Wednesday Friday  He will be followed by Dr. Agosto in [place].

## 2025-02-05 NOTE — PT/OT/SLP EVAL
[facility]  Speech Language Pathology Department  Clinical Swallow Evaluation    Patient Name:  Angel Pablo Sr.   MRN:  [id number]    Recommendations     General recommendations:  SLP follow up x1 regarding diet tolerance  Solid texture recommendation:  Easy to Chew Diet - IDDSI Level 7  Liquid consistency recommendation: Thin liquids - IDDSI Level 0   Medications: per patient preference    History     Angel Pablo Sr. is a/n 79 y.o. male admitted for abnormal labs. SLP order placed 3 days ago, however has been NPO for EGD and colonoscopy. Now cleared for GI diet with nursing reporting no difficulties.     Past Medical History:   Diagnosis Date    Benign prostatic hyperplasia with lower urinary tract symptoms     Carotid artery stenosis     Dementia     Dialysis patient     M/W/F    End stage renal disease     H/O prostate cancer     Hemiplegia and hemiparesis following cerebral infarction affecting left dominant side     Hyperlipidemia     Hypertension     Hypokalemia     Insomnia     Insomnia     Mechanical breakdown of surgically created arteriovenous fistula, subsequent encounter     Muscle wasting and atrophy, not elsewhere classified, multiple sites     Other lack of coordination     Pain in left knee     Paralytic gait     Presbyopia     Presence of intraocular lens     Repeated falls     Seizures     Stroke      Past Surgical History:   Procedure Laterality Date    COLONOSCOPY      COLONOSCOPY, WITH POLYPECTOMY USING HOT BIOPSY FORCEPS N/A 2/4/2025    Procedure: COLONOSCOPY, WITH POLYPECTOMY USING HOT BIOPSY FORCEPS;  Surgeon: Singh Manning MD;  Location: [facility] ENDOSCOPY;  Service: Gastroenterology;  Laterality: N/A;    EGD, WITH CLOSED BIOPSY N/A 2/3/2025    Procedure: EGD;  Surgeon: Singh Manning MD;  Location: [facility] ENDOSCOPY;  Service: Gastroenterology;  Laterality: N/A;    EXCISION OF ARTERIOVENOUS FISTULA Left 05/03/2023    Procedure: EXCISION, AV FISTULA;   Surgeon: Natalie Delatorre MD;  Location: [facility];  Service: Peripheral Vascular;  Laterality: Left;  LEFT UPPER ARM FISTULA REVISION WITH TUNNELED CATHETER INSERTION // VASCULAR // SUPINE // SUPRACLAVICULAR BLOCK    FISTULOGRAM N/A 04/20/2023    Procedure: Fistulogram;  Surgeon: Akil Escobar DO;  Location: [facility] CATH LAB;  Service: Nephrology;  Laterality: N/A;  SANCHEZ FISTULOGRAM    FISTULOGRAM Left 08/22/2023    Dr. Escobar    FISTULOGRAM Left 8/22/2023    Procedure: Fistulogram;  Surgeon: Akil Escobar DO;  Location: [facility] CATH LAB;  Service: Nephrology;  Laterality: Left;    FISTULOGRAM Left 9/28/2023    Procedure: Fistulogram;  Surgeon: Akil Escobar DO;  Location: [facility] CATH LAB;  Service: Nephrology;  Laterality: Left;    INSERTION, VASCULAR ACCESS CATHETER N/A 05/03/2023    Procedure: INSERTION, VASCULAR ACCESS CATHETER;  Surgeon: Natalie Delatorre MD;  Location: [facility];  Service: Peripheral Vascular;  Laterality: N/A;    KNEE SURGERY Left     REMOVAL OF TUNNELED CENTRAL VENOUS CATHETER (CVC) N/A 9/28/2023    Procedure: REMOVAL, CATHETER, CENTRAL VENOUS, TUNNELED;  Surgeon: Akil Escobar DO;  Location: [facility] CATH LAB;  Service: Nephrology;  Laterality: N/A;    REMOVAL OF TUNNELED CENTRAL VENOUS CATHETER (CVC) N/A 10/31/2023    Procedure: REMOVAL, CATHETER, CENTRAL VENOUS, TUNNELED;  Surgeon: Akil Escobar DO;  Location: [facility] CATH LAB;  Service: Nephrology;  Laterality: N/A;    THROMBECTOMY Left 12/27/2024    Procedure: THROMBECTOMY;  Surgeon: Akil Escobar DO;  Location: [facility] CATH LAB;  Service: Nephrology;  Laterality: Left;    TRANSPOSITION OF BASILIC VEIN Left 7/3/2023    Procedure: TRANSPOSITION, VEIN, BASILIC;  Surgeon: Natalie Delatorre MD;  Location: [facility];  Service: Peripheral Vascular;  Laterality: Left;  left radiocephalic vs basilic fistula creation, supraclavicular block // XX       Subjective     Patient awake and alert.    Spiritual/Cultural/Denominational Beliefs/Practices that affect care:  no    Pain/Comfort: Pain Rating 1: 0/10    Respiratory Status:  room air    Restraints/positioning devices: none    Objective     ORAL MUSCULATURE  Dentition: own teeth  Secretion Management: adequate  Mucosal Quality: good  Facial Movement: WFL  Buccal Strength & Mobility: WFL  Mandibular Strength & Mobility: WFL  Oral Labial Strength & Mobility: WFL  Lingual Strength & Mobility: WFL    PO TRIALS  Consistency Fed By Oral Symptoms Pharyngeal Symptoms   Thin liquid by straw Self None None   Chewable solid Self None None     Assessment     Patient tolerated liquids and solids with no difficulties. SLP to follow up, ok to sign off if tolerating diet appropriately.     Outcome Measures     Functional Oral Intake Scale: 7 - Total oral diet with no restrictions      Education     Patient provided with verbal education regarding POC.  Understanding was verbalized.    Plan     SLP Follow-Up:  Yes   Plan of Care reviewed with:  patient     Time Tracking     SLP Treatment Date:   02/05/25  Speech Start Time:  1340  Speech Stop Time:  1348     Speech Total Time (min):  8 min    Billable minutes:  Swallow and Oral Function Evaluation, 8 minutes     02/05/2025

## 2025-02-05 NOTE — PLAN OF CARE
[facility] sent discharge information via ecomom fax to [facility]

## 2025-02-05 NOTE — NURSING
02/05/25 1138   Post-Hemodialysis Assessment   Rinseback Volume (mL) 250 mL   Blood Volume Processed (Liters) 53 L   Dialyzer Clearance Clear   Duration of Treatment 180 minutes   Total UF (mL) 1000 mL   Net Fluid Removal 500   Patient Response to Treatment Tolerated well   Post-Hemodialysis Comments Tx ended, Pt reinfused, Hemostasis acheived.

## 2025-02-05 NOTE — PLAN OF CARE
02/05/25 1003   Final Note   Assessment Type Final Discharge Note   Anticipated Discharge Disposition Nurse Fac [place]   Post-Acute Status   Discharge Delays None known at this time     Patient is discharged to return to [facility] today.  Pharmacy updated. HMD on TTS.  [facility] notified that [facility] will need to send their van to pick him up from hospital. No further discharge planning needs identified.

## 2025-02-05 NOTE — DISCHARGE SUMMARY
"[facility] Medicine Discharge Summary    Admit Date: 2/1/2025  Discharge Date and Time: 2/5/20256:45 AM  Admitting Physician: Hospitalist team   Discharging Physician: Luis Craig MD.  Primary Care Physician: Prieto Vale MD      Discharge Diagnoses:  Suspected chronic GI bleed   Acute on chronic anemia, inflammatory   End-stage renal disease on hemodialysis   Chronic dementia/Parkinson's     Hospital Course:   79-year-old male with history of previous CVA, end-stage renal disease on hemodialysis, dementia, hyperlipidemia, hypertension, and Parkinson's.  Was admitted to the ER on 02/01/2025 complaint of abnormal labs with significant anemia.  Denies any melena or hematemesis.  In the emergency room he was found to have a hemoglobin of 8.7.  Was transfused secondary to a drop in hemoglobin to 7.4.  GI was consulted.  Tentatively planning on EGD on 02/03.  Nephrology consulted for management of his hemodialysis needs.  Vital signs remained stable.  He is admitted to the hospitalist group     EGD performed on 02/03 with some mild gastritis/erythema but no significant ulcerations noted.  GI recommending colonoscopy for further investigation to source of bleeding.  Colonoscopy performed on 02/04.  He is also negative for any source of bleeding.  His hemoglobin steadily increased.  No new complaints or issues.  GI recommending Protonix b.i.d. and follow up with his PCP.  Appears that his anemia is secondary to chronic kidney disease.  Okay to discharge back to nursing home today.  He is also due for hemodialysis so this will need to be coordinated.       Vitals:  Blood pressure 114/71, pulse 74, temperature 98.2 °F (36.8 °C), temperature source Oral, resp. rate 20, height 6' 3" (1.905 m), weight 79.4 kg (175 lb), SpO2 96%..    Physical Exam:  Awake, Alert, Oriented x 3, No new focal Neurologic deficit, Normal Affect  NC/AT, PERRLA, EOMI  Supple neck, no JVD, No cervical " lymphadenopathy  Symmetrical chest, Good air entry bilaterally. No rhonchi, wheezes, crackles appreciated  RRR, No gallop, rub or murmur  +ve Bowel sounds x4, Abd soft and non tender, no rebound, guarding or rigidity  No Cyanosis, cludding or edema, No new rash or bruises    Procedures Performed: No admission procedures for hospital encounter.     Significant Diagnostic Studies: See Full reports for all details  No results displayed because visit has over 200 results.           Microbiology Results (last 7 days)       ** No results found for the last 168 hours. **             No results found.- pulls last radiology orders        Medication List        START taking these medications      pantoprazole 40 MG tablet  Commonly known as: PROTONIX  Take 1 tablet (40 mg total) by mouth 2 (two) times daily before meals.            CONTINUE taking these medications      acetaminophen 650 MG Tbsr  Commonly known as: TYLENOL     ascorbic acid (vitamin C) 1000 MG tablet  Commonly known as: VITAMIN C     atorvastatin 40 MG tablet  Commonly known as: LIPITOR     B complex-vitamin C-folic acid 0.8 mg Tab  Commonly known as: MARIE-JAJA/NEPHRO-JAJA     carvediloL 3.125 MG tablet  Commonly known as: COREG     cinacalcet 30 MG Tab  Commonly known as: SENSIPAR     folic acid 800 MCG Tab  Commonly known as: FOLVITE     levETIRAcetam 500 MG Tab  Commonly known as: KEPPRA     melatonin 5 mg  Commonly known as: MELATIN     memantine 10 MG Tab  Commonly known as: NAMENDA     MILK OF MAGNESIA 400 mg/5 mL Susp  Generic drug: magnesium hydroxide 400 mg/5 ml     MYRBETRIQ 50 mg Tb24  Generic drug: mirabegron     ondansetron 4 MG Tbdl  Commonly known as: ZOFRAN-ODT     polyethylene glycol 17 gram Pwpk  Commonly known as: GLYCOLAX     sertraline 25 MG tablet  Commonly known as: ZOLOFT     tamsulosin 0.4 mg Cap  Commonly known as: FLOMAX     trazodone 25 MG tablet  Commonly known as: DESYREL               Where to Get Your Medications        These  medications were sent to [facility] - [place], [place] - [address]  [address], [facility] [address]      Phone: [phone]   pantoprazole 40 MG tablet          Explained in detail to the patient about the discharge plan, medications, and follow-up visits. Pt understands and agrees with the treatment plan  Discharged Condition: stable  Diet: cardiac/renal  Disposition: [facility], shelter    Medications Per DC med rec  Activities as tolerated  Follow up with your PCP in 2 wks   For further questions contact hospitalist office    Discharge time 33 minutes    For worsening symptoms, chest pain, shortness of breath, increased abdominal pain, high grade fever, stroke or stroke like symptoms, immediately go to the nearest Emergency Room or call 911 as soon as possible.        Luis Barajas M.D, on 2/5/2025. at 6:45 AM.

## 2025-02-06 LAB — PSYCHE PATHOLOGY RESULT: NORMAL

## 2025-04-02 NOTE — DISCHARGE SUMMARY
[facility] - Periop Services  Discharge Note  Short Stay    Procedure(s) (LRB):  EXCISION, AV FISTULA (Left)  INSERTION, VASCULAR ACCESS CATHETER (N/A)      OUTCOME: Patient tolerated treatment/procedure well without complication and is now ready for discharge.    DISPOSITION: Home or Self Care    FINAL DIAGNOSIS:  Mechanical breakdown of surgically created arteriovenous fistula    FOLLOWUP: In clinic    DISCHARGE INSTRUCTIONS:    Discharge Procedure Orders   Diet general     Keep surgical extremity elevated     Wound care routine (specify)   Order Comments: Leave dermabond in place until it falls off;  Ok to wash with soap under running water but do not submerge.  Do not use antibiotics ointment.     Call MD for:  temperature >100.4     Call MD for:  redness, tenderness, or signs of infection (pain, swelling, redness, odor or green/yellow discharge around incision site)     Activity as tolerated         Clinical Reference Documents Added to Patient Instructions         Document    DIALYSIS CATHETER DISCHARGE INSTRUCTIONS (ENGLISH)    HOW TO PREVENT SURGICAL SITE INFECTIONS (ENGLISH)            TIME SPENT ON DISCHARGE: 5 minutes   Pt transferred to [facility] with transport and mom. Pt awake and alert, no pain verbalized, respirations even/unlabored, skin PWD. PIV present to left AC , site intact and flushes well, no induration or infection.

## 2025-05-22 ENCOUNTER — TELEPHONE (OUTPATIENT)
Dept: CARDIOLOGY | Facility: HOSPITAL | Age: 80
End: 2025-05-22
Payer: MEDICARE

## 2025-05-22 NOTE — TELEPHONE ENCOUNTER
Dr. Bates's office sent referral for clotted AV graft. He is s/p graft placement in September of 2024 and s/p thrombectomy with VA stent placement on 12/27/2024. Last HD treatment was Tuesday, 5/20/25.     Called nursing home to schedule patient. Scheduled him for next available which is Monday, 5/26/25. Verified with Dr. Bates's office that they didn't have availability sooner. Spoke with nursing home nurse, instructions given and written copy faxed to center. She reports that he is not a big fluid nu and limits fluid intake. Instructed her to send patient to ED for any overload symptoms over the weekend. She understood the plan and agreed.     Will draw labs prior to procedure. He dialyzes TTS.     -ZT

## 2025-05-23 ENCOUNTER — HOSPITAL ENCOUNTER (OUTPATIENT)
Facility: HOSPITAL | Age: 80
Discharge: HOME | End: 2025-05-23
Attending: STUDENT IN AN ORGANIZED HEALTH CARE EDUCATION/TRAINING PROGRAM | Admitting: STUDENT IN AN ORGANIZED HEALTH CARE EDUCATION/TRAINING PROGRAM
Payer: MEDICARE

## 2025-05-23 ENCOUNTER — TELEPHONE (OUTPATIENT)
Dept: CARDIOLOGY | Facility: HOSPITAL | Age: 80
End: 2025-05-23
Payer: MEDICARE

## 2025-05-23 VITALS
WEIGHT: 174 LBS | OXYGEN SATURATION: 91 % | SYSTOLIC BLOOD PRESSURE: 121 MMHG | HEART RATE: 73 BPM | BODY MASS INDEX: 21.64 KG/M2 | HEIGHT: 75 IN | TEMPERATURE: 98 F | DIASTOLIC BLOOD PRESSURE: 67 MMHG

## 2025-05-23 DIAGNOSIS — Z99.2 ESRD (END STAGE RENAL DISEASE) ON DIALYSIS: Primary | ICD-10-CM

## 2025-05-23 DIAGNOSIS — T82.868A THROMBOSIS OF KIDNEY DIALYSIS ARTERIOVENOUS GRAFT, INITIAL ENCOUNTER: ICD-10-CM

## 2025-05-23 DIAGNOSIS — N18.6 ESRD (END STAGE RENAL DISEASE) ON DIALYSIS: Primary | ICD-10-CM

## 2025-05-23 LAB
ANION GAP SERPL CALC-SCNC: 14 MEQ/L
BUN SERPL-MCNC: 62.2 MG/DL (ref 8.4–25.7)
CALCIUM SERPL-MCNC: 9 MG/DL (ref 8.8–10)
CHLORIDE SERPL-SCNC: 98 MMOL/L (ref 98–107)
CO2 SERPL-SCNC: 27 MMOL/L (ref 23–31)
CREAT SERPL-MCNC: 10.12 MG/DL (ref 0.72–1.25)
CREAT/UREA NIT SERPL: 6
GFR SERPLBLD CREATININE-BSD FMLA CKD-EPI: 5 ML/MIN/1.73/M2
GLUCOSE SERPL-MCNC: 101 MG/DL (ref 82–115)
POTASSIUM SERPL-SCNC: 5.1 MMOL/L (ref 3.5–5.1)
SODIUM SERPL-SCNC: 139 MMOL/L (ref 136–145)

## 2025-05-23 PROCEDURE — C1769 GUIDE WIRE: HCPCS | Performed by: STUDENT IN AN ORGANIZED HEALTH CARE EDUCATION/TRAINING PROGRAM

## 2025-05-23 PROCEDURE — 27201423 OPTIME MED/SURG SUP & DEVICES STERILE SUPPLY: Performed by: STUDENT IN AN ORGANIZED HEALTH CARE EDUCATION/TRAINING PROGRAM

## 2025-05-23 PROCEDURE — 36905 THRMBC/NFS DIALYSIS CIRCUIT: CPT | Performed by: STUDENT IN AN ORGANIZED HEALTH CARE EDUCATION/TRAINING PROGRAM

## 2025-05-23 PROCEDURE — 76937 US GUIDE VASCULAR ACCESS: CPT | Performed by: STUDENT IN AN ORGANIZED HEALTH CARE EDUCATION/TRAINING PROGRAM

## 2025-05-23 PROCEDURE — 99152 MOD SED SAME PHYS/QHP 5/>YRS: CPT | Mod: ,,, | Performed by: STUDENT IN AN ORGANIZED HEALTH CARE EDUCATION/TRAINING PROGRAM

## 2025-05-23 PROCEDURE — C1725 CATH, TRANSLUMIN NON-LASER: HCPCS | Performed by: STUDENT IN AN ORGANIZED HEALTH CARE EDUCATION/TRAINING PROGRAM

## 2025-05-23 PROCEDURE — C1894 INTRO/SHEATH, NON-LASER: HCPCS | Performed by: STUDENT IN AN ORGANIZED HEALTH CARE EDUCATION/TRAINING PROGRAM

## 2025-05-23 PROCEDURE — 36905 THRMBC/NFS DIALYSIS CIRCUIT: CPT | Mod: LT,,, | Performed by: STUDENT IN AN ORGANIZED HEALTH CARE EDUCATION/TRAINING PROGRAM

## 2025-05-23 PROCEDURE — 76937 US GUIDE VASCULAR ACCESS: CPT | Mod: 26,,, | Performed by: STUDENT IN AN ORGANIZED HEALTH CARE EDUCATION/TRAINING PROGRAM

## 2025-05-23 PROCEDURE — 80048 BASIC METABOLIC PNL TOTAL CA: CPT | Performed by: STUDENT IN AN ORGANIZED HEALTH CARE EDUCATION/TRAINING PROGRAM

## 2025-05-23 PROCEDURE — 99152 MOD SED SAME PHYS/QHP 5/>YRS: CPT | Performed by: STUDENT IN AN ORGANIZED HEALTH CARE EDUCATION/TRAINING PROGRAM

## 2025-05-23 PROCEDURE — 63600175 PHARM REV CODE 636 W HCPCS: Performed by: STUDENT IN AN ORGANIZED HEALTH CARE EDUCATION/TRAINING PROGRAM

## 2025-05-23 PROCEDURE — C1757 CATH, THROMBECTOMY/EMBOLECT: HCPCS | Performed by: STUDENT IN AN ORGANIZED HEALTH CARE EDUCATION/TRAINING PROGRAM

## 2025-05-23 PROCEDURE — 99213 OFFICE O/P EST LOW 20 MIN: CPT | Mod: 25,,, | Performed by: STUDENT IN AN ORGANIZED HEALTH CARE EDUCATION/TRAINING PROGRAM

## 2025-05-23 PROCEDURE — 99153 MOD SED SAME PHYS/QHP EA: CPT | Performed by: STUDENT IN AN ORGANIZED HEALTH CARE EDUCATION/TRAINING PROGRAM

## 2025-05-23 PROCEDURE — C1887 CATHETER, GUIDING: HCPCS | Performed by: STUDENT IN AN ORGANIZED HEALTH CARE EDUCATION/TRAINING PROGRAM

## 2025-05-23 RX ORDER — MIDAZOLAM HYDROCHLORIDE 1 MG/ML
INJECTION INTRAMUSCULAR; INTRAVENOUS
Status: DISCONTINUED | OUTPATIENT
Start: 2025-05-23 | End: 2025-05-23 | Stop reason: HOSPADM

## 2025-05-23 RX ORDER — LIDOCAINE HYDROCHLORIDE 10 MG/ML
INJECTION, SOLUTION INFILTRATION; PERINEURAL
Status: DISCONTINUED | OUTPATIENT
Start: 2025-05-23 | End: 2025-05-23 | Stop reason: HOSPADM

## 2025-05-23 RX ORDER — SODIUM CHLORIDE 0.9 % (FLUSH) 0.9 %
10 SYRINGE (ML) INJECTION
Status: DISCONTINUED | OUTPATIENT
Start: 2025-05-23 | End: 2025-05-23 | Stop reason: HOSPADM

## 2025-05-23 RX ORDER — HEPARIN SODIUM 1000 [USP'U]/ML
INJECTION, SOLUTION INTRAVENOUS; SUBCUTANEOUS
Status: DISCONTINUED | OUTPATIENT
Start: 2025-05-23 | End: 2025-05-23 | Stop reason: HOSPADM

## 2025-05-23 RX ORDER — FENTANYL CITRATE 50 UG/ML
INJECTION, SOLUTION INTRAMUSCULAR; INTRAVENOUS
Status: DISCONTINUED | OUTPATIENT
Start: 2025-05-23 | End: 2025-05-23 | Stop reason: HOSPADM

## 2025-05-23 NOTE — PLAN OF CARE
Discharge instructions given to patient and family, verbalized understanding. Left via wheelchair.

## 2025-05-23 NOTE — PROCEDURES
INTERVENTIONAL NEPHROLOGY PROCEDURE NOTE: PERCUTANEOUS THROMBECTOMY       Patient Name: Angel Pablo Sr. MONTANO 1945    Procedure Date:    2025     Performing Physician:   Dr. Tsai    Access History: Pt is with ESRD on HD typically via left brachioaxillary arteriovenous graft who presents today with thrombosed HD access.    Pre-Op Diagnosis: T82.868A Thrombosis of vascular prosthetic devices, implants and grafts, initial encounter, N18.6 End Stage Renal Disease (ESRD).  Post-Op Diagnosis: Same    Procedure: Percutaneous thrombectomy of  left brachioaxillary arteriovenous graft.    Indication: Thrombosed HD access.    Informed Consent:  The patient was evaluated in the pre-operative area with assessment including the American Society of Anesthesia risk classification. The procedure is discussed in detail including risks, benefits alternatives and options and the patient agrees to proceed. Informed consent was obtained from the patient.     Maximum sterile barrier technique: The patient was prepped and draped using chlorhexidine prep and maximum sterile barrier technique.    Sedation Note:  Risks and benefits of sedation were reviewed with the patient or surrogate, including bleeding, infection, nausea, vomiting, dizziness, instability, damage to a nerve, damage to a blood vessel, cellulitis, reaction to medications, amnesia, loss of consciousness, respiratory arrest, cardiac arrest.     The patient received the following medications: Versed 1 mg IV and Fentanyl 25 mcg IV; patient did remain alert, responsive, and conversational throughout the procedure. I was personally responsible for supervising the administration of moderate sedation services during the procedure performed and I affirm all the guidelines and requirements described in the CPT 2025 section on moderate sedation were followed, including the use of an independent trained observer who had no other duties during the procedure.  The total face-to-face time was 54 minutes.    Procedure Steps:     The patient was prepped and draped in sterile fashion. Procedure ultrasound revealed thrombosed vascular access.     Our first portion of the procedure was dedicated to establishing an appropriate OUTFLOW segment:  Local anesthesia was administered by injecting 1% lidocaine at the intended site of cannulation. With use of live ultrasonographic visualization, the vascular access was successfully cannulated with an 18 gauge needle towards the venous end (image saved to chart). A 150 cm glide wire was placed into the access and advanced until tip was parked in the SVC. The needle was removed over wire and exchanged for a 7 Fr sheath.    A 45 cm 5 Fr angiocatheter was placed on the glide wire and advanced to the central vasculature. Central angiogram revealed patent superior vena cava, left innominate vein, and left subclavian vein. Pull-back angiogram was completed showing patent axillary vein, however clot burden identified at the Covera flared stent.    Pull-back angiogram was continued back to the sheath, now with 4 mg of tPA being injected at the beginning of thrombus formation. Five minutes of gentle massage throughout the access was completed.    Glidewire was re-introduced with tip parked in the central veins. Then a BD Cambria 7 mm x 80 mm balloon was placed at the beginning of thrombus formation and mechanical thrombectomy by balloon maceration was underwent throughout the access down to the sheath. Multiple inflations were completed to 15-20 GEORGE for 1-5 seconds. The balloon was removed and clot aspiration was completed. Gentle angiogram was completed, revealing reduction in thrombus within the access. However, angiogram did reveal the following lesions:  - Approximately 50% stenosis in the venous anastomosis (VA).  - Approximately 75% stenosis in the dBOG.    Angioplasty was performed at the VA using a BD Cambria 9 mm x 40 mm balloon.  Approximately 100% effacement was obtained at 15-20 GEORGE and was held for 1-5 seconds. Post-angioplasty angiogram revealed 0% residual stenosis.    We repeated balloon maceration of thrombus and clot aspiration with appropriate reduction of thrombus within the access.    Back-bleeding was achieved.    We then focused our procedure on thrombectomizing the INFLOW segment:  Local anesthesia was administered by injecting 1% lidocaine at the intended site of cannulation. With use of live ultrasonographic visualization, a second cannulation was made with the needle pointing towards the arterial anastomosis (image saved to chart). The 18 gauge needle was exchanged for a 6 Fr sheath. A 40 cm Prisca catheter was introduced over glide wire and was passed into the feeding artery. The Prisca balloon was inflated and pulled back through the access to the sheath while clots were aspirated. This was completed multiple times. A thrill was appreciated throughout the access after completion. Multiple angiograms were completed using the Prisca catheter to ensure no residual thrombus was seen throughout the access.      Angiogram did not reveal stenosis located near the arterial anastomosis/proximal fistula.    Patency throughout the vascular access was confirmed with multiple angiograms. Wire and sheath(s) removed and hemostasis was achieved using a purse-string stitch and light digital pressure at the site(s) of cannulation.    ASSESSMENT/PLAN:  - Successful percutaneous thrombectomy    EBL: 10 ml    Contrast: 30 ml    Complications: None    Post-op Instructions: The patient was given both verbal and written post-op instructions. If excessive bleeding at the site, they have been instructed to call their physician or proceed to a local emergency room.    Orders to the dialysis unit: OK to use access for dialysis needs.    Thank you for allowing me the opportunity in taking care of this patient. Please reach me with any  questions.    Catalino Tsai MD  Interventional Nephrology  Cell: [phone]

## 2025-05-23 NOTE — H&P
INTERVENTIONAL NEPHROLOGY HISTORY & PHYSICAL       Patient Name: Angel Pablo Sr. MONTANO 1945    Date: 2025  Time: 12:46 PM         HPI: 79 y.o. male with ESRD on HD via left brachioaxillary arteriovenous graft who presents with thrombosed AVG. Last successful HD last Saturday, attempted to cannulate on Tuesday but unsuccessful. Pt is being prepared for percutaneous thrombectomy today.    Pt seen and examined at bedside in CVSS this AM. Family is present. Risks and benefits of percutaneous thrombectomy and intravenous conscious sedation was reviewed with the patient. The patient agrees to proceed with the intended procedure. Consents for both intravenous conscious sedation and procedure were signed and placed within the chart.       Review of Systems:  General:  No fatigue  Skin: No rashes  HEENT: No vision changes  CVS: No CP  RS: No SOB  GIT: No abdominal pain  Extremities: No swelling  Neurological:  No focal weakness  Psych: No depression    Past Medical History:   Diagnosis Date    Benign prostatic hyperplasia with lower urinary tract symptoms     Carotid artery stenosis     Dementia     Dialysis patient     M/W/F    End stage renal disease     H/O prostate cancer     Hemiplegia and hemiparesis following cerebral infarction affecting left dominant side     Hyperlipidemia     Hypertension     Hypokalemia     Insomnia     Insomnia     Mechanical breakdown of surgically created arteriovenous fistula, subsequent encounter     Muscle wasting and atrophy, not elsewhere classified, multiple sites     Other lack of coordination     Pain in left knee     Paralytic gait     Presbyopia     Presence of intraocular lens     Repeated falls     Seizures     Stroke       Past Surgical History:   Procedure Laterality Date    COLONOSCOPY      COLONOSCOPY, WITH POLYPECTOMY USING HOT BIOPSY FORCEPS N/A 2025    Procedure: COLONOSCOPY, WITH POLYPECTOMY USING HOT BIOPSY FORCEPS;  Surgeon: Singh Manning MD;   Location: [facility] ENDOSCOPY;  Service: Gastroenterology;  Laterality: N/A;    EGD, WITH CLOSED BIOPSY N/A 2/3/2025    Procedure: EGD;  Surgeon: Singh Manning MD;  Location: [facility] ENDOSCOPY;  Service: Gastroenterology;  Laterality: N/A;    EXCISION OF ARTERIOVENOUS FISTULA Left 05/03/2023    Procedure: EXCISION, AV FISTULA;  Surgeon: Natalie Delatorre MD;  Location: [facility];  Service: Peripheral Vascular;  Laterality: Left;  LEFT UPPER ARM FISTULA REVISION WITH TUNNELED CATHETER INSERTION // VASCULAR // SUPINE // SUPRACLAVICULAR BLOCK    FISTULOGRAM N/A 04/20/2023    Procedure: Fistulogram;  Surgeon: Akil Escobar DO;  Location: [facility] CATH LAB;  Service: Nephrology;  Laterality: N/A;  LUE FISTULOGRAM    FISTULOGRAM Left 08/22/2023    Dr. Escobar    FISTULOGRAM Left 8/22/2023    Procedure: Fistulogram;  Surgeon: Akil Escobar DO;  Location: [facility] CATH LAB;  Service: Nephrology;  Laterality: Left;    FISTULOGRAM Left 9/28/2023    Procedure: Fistulogram;  Surgeon: Akil Escobar DO;  Location: [facility] CATH LAB;  Service: Nephrology;  Laterality: Left;    INSERTION, VASCULAR ACCESS CATHETER N/A 05/03/2023    Procedure: INSERTION, VASCULAR ACCESS CATHETER;  Surgeon: Natalie Delatorre MD;  Location: [facility];  Service: Peripheral Vascular;  Laterality: N/A;    KNEE SURGERY Left     REMOVAL OF TUNNELED CENTRAL VENOUS CATHETER (CVC) N/A 9/28/2023    Procedure: REMOVAL, CATHETER, CENTRAL VENOUS, TUNNELED;  Surgeon: Akil Escobar DO;  Location: [facility] CATH LAB;  Service: Nephrology;  Laterality: N/A;    REMOVAL OF TUNNELED CENTRAL VENOUS CATHETER (CVC) N/A 10/31/2023    Procedure: REMOVAL, CATHETER, CENTRAL VENOUS, TUNNELED;  Surgeon: Akil Escobar DO;  Location: [facility] CATH LAB;  Service: Nephrology;  Laterality: N/A;    THROMBECTOMY Left 12/27/2024    Procedure: THROMBECTOMY;  Surgeon: Akil Escobar DO;  Location: [facility] CATH LAB;  Service: Nephrology;  Laterality: Left;    TRANSPOSITION OF BASILIC VEIN Left 7/3/2023     Procedure: TRANSPOSITION, VEIN, BASILIC;  Surgeon: Natalie Delatorre MD;  Location: [facility] OR;  Service: Peripheral Vascular;  Laterality: Left;  left radiocephalic vs basilic fistula creation, supraclavicular block // XX      Review of patient's allergies indicates:  No Known Allergies   Social History[1]   Family History   Family history unknown: Yes       Current Medications[2]    Vital Signs:  Temp:  [98.3 °F (36.8 °C)] 98.3 °F (36.8 °C)  Pulse:  [60] 60  SpO2:  [98 %] 98 %  BP: (124)/(73) 124/73     Physical Exam:  General: NAD  HEENT: NC/AT, EOMI  CVS: RRR.  RS: breathing easily.  Abdominal: Soft, NT/ND.  Extremities: No edema b/l LE  Skin: No rash, no lesions.  Neurological: No focal deficits.  Psych: Normal affect  Dialysis Access: left brachioaxillary arteriovenous graft w/o thrill    Results:    Lab Results   Component Value Date     02/05/2025    K 4.1 02/05/2025    CL 99 02/05/2025    CO2 27 02/05/2025    BUN 37.9 (H) 02/05/2025    CREATININE 8.37 (H) 02/05/2025    GLU 91 02/05/2025     Lab Results   Component Value Date    WBC 5.57 02/05/2025    HGB 8.0 (L) 02/05/2025     02/05/2025    MCV 99.2 (H) 02/05/2025       Assessment and Plan:      ESRD on HD via left brachioaxillary arteriovenous graft.  Thrombosis of AVG.  Pt with ESRD on HD via left brachioaxillary arteriovenous graft who presents today with thrombosed AVG. The pt is being prepared for percutaneous thrombectomy today.  - Consents obtained and placed within chart.  - Will proceed in cath lab setting today.    Please feel free to reach me with any questions.    Catalino Tsai MD  Interventional Nephrology  Cell: [phone]       [1]   Social History  Tobacco Use    Smoking status: Never    Smokeless tobacco: Never   Substance Use Topics    Alcohol use: Not Currently    Drug use: Never   [2]   Current Facility-Administered Medications:     sodium chloride 0.9% flush 10 mL, 10 mL, Intravenous, PRN, Catalino Tsai,  MD    Facility-Administered Medications Ordered in Other Encounters:     sodium chloride 0.9% flush 10 mL, 10 mL, Intravenous, PRN, Hasan, Akil, DO    sodium chloride 0.9% flush 10 mL, 10 mL, Intravenous, PRN, Hasan, Akil, DO

## 2025-05-23 NOTE — TELEPHONE ENCOUNTER
"Received notification this morning from patient's HD center. Notification states "patient unable to wait until 5/26 for declot". Called HD center for more information. HD center nurse, Chari, states patient's nephrologist wants the procedure completed sooner since Mr. Pablo' last treatment was 5/20.     Called patient's NH to see if they could transport patient today for thrombectomy. NH states they are unable to transport patient today, however, patient does have a sister that could transport patient. Gave an arrival time. Mr. Pablo will have thrombectomy today, 5/23.  "

## 2025-05-23 NOTE — DISCHARGE SUMMARY
INTERVENTIONAL NEPHROLOGY DISCHARGE SUMMARY         Patient Name: Angel Pablo Sr. MONTANO 1945    Procedure Date: 2025      In brief, Mr. Pablo underwent successful percutaneous thrombectomy of his left brachial artery-axillary vein arteriovenous graft.    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    Discharge Instructions/Recommendations:  - Continue use of left BA AVG  - Pt may be discharged after successful monitoring in post-op area.  - Pt may resume home medications.    Thank you for allowing me the opportunity in taking care of this patient. Please reach me with any questions.    Catalino Tsai MD  Interventional Nephrology  Cell: [phone]

## (undated) DEVICE — SUT VICRYL 4-0 27 RB-1

## (undated) DEVICE — CONTAINER SPECIMEN SCREW 4OZ

## (undated) DEVICE — FLOWSWITCH HP 1-W W/O LL

## (undated) DEVICE — SUT 2/0 30IN SILK BLK BRAI

## (undated) DEVICE — PRESTO INFLATION DEVICE

## (undated) DEVICE — PAD PREP 50/CA

## (undated) DEVICE — SHEATH PINNACLE 6FR HIFLO

## (undated) DEVICE — COVER PROBE US 5.5X58L NON LTX

## (undated) DEVICE — DRAPE INCISE IOBAN 2 13X13IN

## (undated) DEVICE — APPLICATOR CHLORAPREP ORN 26ML

## (undated) DEVICE — CATH GUID LNCH MP1 7F 90CM

## (undated) DEVICE — Device

## (undated) DEVICE — SPONGE COTTON TRAY 4X4IN

## (undated) DEVICE — CATH DORADO BLLN DIL 10X4X80

## (undated) DEVICE — SHEATH PINNACLE 7FR HIFLO

## (undated) DEVICE — KIT SURGICAL TURNOVER

## (undated) DEVICE — SUT 4-0 12-18IN SILK BLACK

## (undated) DEVICE — GLOVE PROTEXIS HYDROGEL SZ6.5

## (undated) DEVICE — CATH DORADO BLLN DIL 6F 9X4X80

## (undated) DEVICE — DRAPE C-ARM COVER EZ 36X28IN

## (undated) DEVICE — SET ANGIO ACIST CVI ANGIOTOUCH

## (undated) DEVICE — CATH EMB FOGARTY 5.5FRX40CM

## (undated) DEVICE — SYR 10CC LUER LOCK

## (undated) DEVICE — BAG MEDI-PLAST DECANTER C-FLOW

## (undated) DEVICE — TOWEL OR DISP STRL BLUE 4/PK

## (undated) DEVICE — DRAPE UTILITY W/ TAPE 20X30IN

## (undated) DEVICE — KIT MINI STK MAX COAX 5FR 10CM

## (undated) DEVICE — NDL PERC ENTRY BSDN 18-7.0

## (undated) DEVICE — SUT MONOCRYL 3-0 PS-2 UND

## (undated) DEVICE — DRESSING TEGADERM 4.4X5IN

## (undated) DEVICE — GUIDEWIRE STD .035X180CM ANG

## (undated) DEVICE — DRESSING TRANS 4X4 TEGADERM

## (undated) DEVICE — PAD RADI PEDIATRIC

## (undated) DEVICE — SYR 3CC LUER LOC

## (undated) DEVICE — BANDAGE CURITY SHEER ADH 1X3IN

## (undated) DEVICE — ELECTRODE PATIENT RETURN DISP

## (undated) DEVICE — ADHESIVE DERMABOND ADVANCED

## (undated) DEVICE — CATH DORADO 10X4

## (undated) DEVICE — CATH BLLN DURADO 7 X 4

## (undated) DEVICE — ELECTRODE REM PLYHSV RETURN 9

## (undated) DEVICE — KIT CANIST SUCTION 1200CC

## (undated) DEVICE — SUT 3-0 12-18IN SILK

## (undated) DEVICE — FORCEP BIOPSY RAD JAW 240CM

## (undated) DEVICE — SUT MCRYL PLUS 4-0 PS2 27IN

## (undated) DEVICE — CATH CHARGER .035IN 8X40X75

## (undated) DEVICE — GLOVE PROTEXIS PI SYN SURG 6.0

## (undated) DEVICE — TRAP SUCTION POLYP

## (undated) DEVICE — CUP PROFEX GLASS GRADUATE 1OZ

## (undated) DEVICE — SUT 2-0 12-18IN SILK

## (undated) DEVICE — SNARE CAPTIFLEX 2.4X27MM 240CM

## (undated) DEVICE — SYS CLSR DERMABOND PRINEO 22CM

## (undated) DEVICE — SOL NORMAL USPCA 0.9%

## (undated) DEVICE — COLLECTION SPECIMEN NEPTUNE

## (undated) DEVICE — CLIP LIGATING HEMOCLP SMALL

## (undated) DEVICE — BANDAGE GAUZE COT STRL 4.5X4.1

## (undated) DEVICE — GLIDE CATH ANGLED 4FR 65CM

## (undated) DEVICE — CATH CONQUEST 40 7X9X4X75

## (undated) DEVICE — GAUZE VISTEC XR DTECT 16 4X4IN

## (undated) DEVICE — CANNULA ADULT NASAL 7FT

## (undated) DEVICE — DRESSING TEGADERM CHG 3.5X4.5

## (undated) DEVICE — GUIDEWIRE ANGLED .035 150CM

## (undated) DEVICE — DRESSING 2X2 6PLY QUIKCLOT

## (undated) DEVICE — ADHESIVE DERMABOND MINI HV

## (undated) DEVICE — BOWL STERILE LARGE 32OZ

## (undated) DEVICE — CANNULA NASAL ADLT EAR 25FT

## (undated) DEVICE — SHEATH BRITE TIP WIRE 8F 5.5CM

## (undated) DEVICE — GLOVE PROTEXIS PI SYN SURG 8.0

## (undated) DEVICE — BLLN DORADO 7 X 60 X 80

## (undated) DEVICE — FORCEP HEMOSTAT MOSQUITO STR

## (undated) DEVICE — CATH DORADO 7X8X80

## (undated) DEVICE — GAUZE AVANT SPNG 4PLY STRL 4X4

## (undated) DEVICE — SUT PROLENE 6-0 BV-1 30IN

## (undated) DEVICE — CATH BLLN DORADO 8-4

## (undated) DEVICE — TIP SUCTION YANKAUER

## (undated) DEVICE — SOL IRR NACL .9% 1000CC

## (undated) DEVICE — SUT VICRYL 3-0 27 SH

## (undated) DEVICE — SUT PROLENE 6-0 24 BV-1

## (undated) DEVICE — TUBING O2 FEMALE CONN 13FT

## (undated) DEVICE — MARKER WRITESITE SKIN CHLRAPRP

## (undated) DEVICE — NDL HYPO REG 25G X 1 1/2

## (undated) DEVICE — CATH GLIDE ANGLED 5FR 65CM

## (undated) DEVICE — SYR 30CC LUER LOCK

## (undated) DEVICE — SUT VICRYL 2-0 36 CT-1

## (undated) DEVICE — UNDERPAD PROTECT PLUS 17X24IN

## (undated) DEVICE — BANDAGE COMPR 6IN 5.8YD

## (undated) DEVICE — SOL IRRI STRL WATER 1000ML

## (undated) DEVICE — TOWEL OR DLX BLUE 17X27IN 4/PK

## (undated) DEVICE — SHEATH BRITE TIP INTRO 23CM 8F

## (undated) DEVICE — CATH DORADO BLLN DIL 6F 8X4X80

## (undated) DEVICE — CANNULA NASAL ADULT

## (undated) DEVICE — ADAPTER DUAL NSL LUER M-M 7FT

## (undated) DEVICE — DRAPE INCISE IOBAN 2 23X23IN

## (undated) DEVICE — NDL HYPO 22GX1 1/2 SYR 10ML LL

## (undated) DEVICE — KIT SURGICAL COLON .25 1.1OZ

## (undated) DEVICE — GUIDEWIRE STF .035X180CM ANG

## (undated) DEVICE — GLOVE PROTEXIS PI SYN SURG 6.5

## (undated) DEVICE — COVER C-ARM STRAP BAND 44X80IN